# Patient Record
Sex: FEMALE | Race: BLACK OR AFRICAN AMERICAN | URBAN - METROPOLITAN AREA
[De-identification: names, ages, dates, MRNs, and addresses within clinical notes are randomized per-mention and may not be internally consistent; named-entity substitution may affect disease eponyms.]

---

## 2021-02-01 ENCOUNTER — WEB ENCOUNTER (OUTPATIENT)
Dept: URBAN - METROPOLITAN AREA CLINIC 40 | Facility: CLINIC | Age: 31
End: 2021-02-01

## 2021-02-01 ENCOUNTER — LAB OUTSIDE AN ENCOUNTER (OUTPATIENT)
Dept: URBAN - METROPOLITAN AREA CLINIC 40 | Facility: CLINIC | Age: 31
End: 2021-02-01

## 2021-02-01 ENCOUNTER — OFFICE VISIT (OUTPATIENT)
Dept: URBAN - METROPOLITAN AREA CLINIC 40 | Facility: CLINIC | Age: 31
End: 2021-02-01
Payer: COMMERCIAL

## 2021-02-01 DIAGNOSIS — K76.9 LIVER DISEASE: ICD-10-CM

## 2021-02-01 DIAGNOSIS — R94.5 ABNORMAL LIVER FUNCTION: ICD-10-CM

## 2021-02-01 DIAGNOSIS — K76.0 FATTY LIVER: ICD-10-CM

## 2021-02-01 DIAGNOSIS — R74.8 ELEVATED LIVER ENZYMES: ICD-10-CM

## 2021-02-01 PROCEDURE — 99242 OFF/OP CONSLTJ NEW/EST SF 20: CPT | Performed by: INTERNAL MEDICINE

## 2021-02-01 PROCEDURE — 99202 OFFICE O/P NEW SF 15 MIN: CPT | Performed by: INTERNAL MEDICINE

## 2021-02-01 RX ORDER — CLOBETASOL PROPIONATE 0.5 MG/ML
(PRIOR AUTH#:000000919915) SOLUTION TOPICAL
Qty: 50 UNSPECIFIED | Status: DISCONTINUED | COMMUNITY

## 2021-02-01 NOTE — HPI-TODAY'S VISIT:
Pt referred by Dr. Francisco J Bobo. A copy of this note will be provided to review. Early December    repeat labs 2 weeks later showed  .  No hepatitis testing done.  Patient was told she may have gallbladder disease or liver disease and referred to GI.  I do not know what her bilirubin was.  Patient rarely drinks alcohol, maybe 3 times a month and socially only.  Daily marijuana use prior to these labs but maybe once weekly since then.  No additional drug use or other medication use that would be associated with elevated liver tests.  History of HSV, she takes acyclovir occasionally with outbreaks but otherwise no daily use.  No history of significant jaundice or abnormal illness previously.  She really had not seen healthcare providers recently, she presented to OB/GYN for AN annual evaluation and these labs were noticed.

## 2021-02-08 LAB
A/G RATIO: 0.6
AAT, DNA ANALYSIS: (no result)
ACTIN (SMOOTH MUSCLE) ANTIBODY: 162
ADDITIONAL INFORMATION:: (no result)
ALBUMIN: 3.5
ALKALINE PHOSPHATASE: 354
ALT (SGPT): 259
ANA DIRECT: POSITIVE
AST (SGOT): 318
BASO (ABSOLUTE): 0.1
BASOS: 1
BILIRUBIN, TOTAL: 0.6
BUN/CREATININE RATIO: 13
BUN: 9
CALCIUM: 8.8
CARBON DIOXIDE, TOTAL: 23
CERULOPLASMIN: 32.5
CHLORIDE: 105
CREATININE: 0.71
EGFR IF AFRICN AM: 130
EGFR IF NONAFRICN AM: 113
EOS (ABSOLUTE): 0.3
EOS: 6
GGT: 407
GLOBULIN, TOTAL: 6.2
GLUCOSE: 93
HBSAG SCREEN: NEGATIVE
HEMATOCRIT: 32.5
HEMATOLOGY COMMENTS:: (no result)
HEMOGLOBIN: 10.4
HEP A AB, IGM: NEGATIVE
HEP B CORE AB, IGM: NEGATIVE
HEP C VIRUS AB: 0.1
HEREDITARY  HEMOCHROMATOSIS: (no result)
IMMATURE CELLS: (no result)
IMMATURE GRANS (ABS): 0
IMMATURE GRANULOCYTES: 0
LYMPHS (ABSOLUTE): 2.1
LYMPHS: 38
Lab: (no result)
MCH: 28.7
MCHC: 32
MCV: 90
MITOCHONDRIAL (M2) ANTIBODY: <20
MONOCYTES(ABSOLUTE): 0.6
MONOCYTES: 11
NEUTROPHILS (ABSOLUTE): 2.5
NEUTROPHILS: 44
NRBC: (no result)
PLATELETS: 199
POTASSIUM: 4.1
PROTEIN, TOTAL: 9.7
RBC: 3.63
RDW: 13.1
SODIUM: 136
WBC: 5.5

## 2021-02-09 ENCOUNTER — TELEPHONE ENCOUNTER (OUTPATIENT)
Dept: URBAN - METROPOLITAN AREA CLINIC 92 | Facility: CLINIC | Age: 31
End: 2021-02-09

## 2021-02-09 ENCOUNTER — LAB OUTSIDE AN ENCOUNTER (OUTPATIENT)
Dept: URBAN - METROPOLITAN AREA CLINIC 92 | Facility: CLINIC | Age: 31
End: 2021-02-09

## 2021-02-12 ENCOUNTER — TELEPHONE ENCOUNTER (OUTPATIENT)
Dept: URBAN - METROPOLITAN AREA SURGERY CENTER 30 | Facility: SURGERY CENTER | Age: 31
End: 2021-02-12

## 2021-02-26 ENCOUNTER — OFFICE VISIT (OUTPATIENT)
Dept: URBAN - METROPOLITAN AREA CLINIC 40 | Facility: CLINIC | Age: 31
End: 2021-02-26

## 2021-03-10 ENCOUNTER — TELEPHONE ENCOUNTER (OUTPATIENT)
Dept: URBAN - METROPOLITAN AREA CLINIC 92 | Facility: CLINIC | Age: 31
End: 2021-03-10

## 2021-03-17 ENCOUNTER — LAB OUTSIDE AN ENCOUNTER (OUTPATIENT)
Dept: URBAN - METROPOLITAN AREA TELEHEALTH 2 | Facility: TELEHEALTH | Age: 31
End: 2021-03-17

## 2021-03-17 ENCOUNTER — OFFICE VISIT (OUTPATIENT)
Dept: URBAN - METROPOLITAN AREA TELEHEALTH 2 | Facility: TELEHEALTH | Age: 31
End: 2021-03-17
Payer: COMMERCIAL

## 2021-03-17 DIAGNOSIS — Z79.899 HIGH RISK MEDICATION USE: ICD-10-CM

## 2021-03-17 DIAGNOSIS — K75.4 AUTOIMMUNE HEPATITIS: ICD-10-CM

## 2021-03-17 DIAGNOSIS — R74.8 ABNORMAL LIVER ENZYMES: ICD-10-CM

## 2021-03-17 DIAGNOSIS — R76.9 ABNORMAL IMMUNOLOGICAL FINDING IN SERUM: ICD-10-CM

## 2021-03-17 PROBLEM — 271737000 ANEMIA: Status: ACTIVE | Noted: 2021-03-17

## 2021-03-17 PROCEDURE — 99244 OFF/OP CNSLTJ NEW/EST MOD 40: CPT

## 2021-03-17 PROCEDURE — 99204 OFFICE O/P NEW MOD 45 MIN: CPT

## 2021-03-17 RX ORDER — URSODIOL 300 MG/1
1 CAPSULE CAPSULE ORAL
Qty: 60 | Refills: 1 | OUTPATIENT

## 2021-03-17 RX ORDER — VALACYCLOVIR 500 MG/1
1 TABLET TABLET, FILM COATED ORAL BID PRN
Status: ACTIVE | COMMUNITY

## 2021-03-17 RX ORDER — PREDNISONE 5 MG/1
4 TABLET TABLET ORAL
Qty: 120 | Refills: 1 | OUTPATIENT
End: 2021-05-16

## 2021-03-17 NOTE — HPI-TODAY'S VISIT:
Patient is a 31-year-old female seen by Dr. Ryan at the [place] office and referred for [facility] recently diagnosed.  Patient is noted to have abnormal liver labs there were noted late    She was referred by Dr. Bobo to see him and in late  she had an AST of 319  and repeat labs then showed an  and .  She says he was a new doctor for her and she had just started to see the primary md.  She said had gone in for pap smear and labs abn.  She does not recall liver lab issue prior.  Initially she had been told she may have had gallbladder disease  but no symptoms and not prior liver disease personally.  She says in family had a second cousin female and 49 when  (she was ill and no insurance and so not tested) from cirrhosis and 2 uncles with cirrhosis possibly from alcohol and passed.  Dr Ryan: She mentioned rare alcohol usage maybe 3 times a month and very social only. She has stopped that.  She has stage 3 fibrosis and should not drink.  She reviewed bx with [facility].  She did states she was using marijuana daily prior but then had switched to once a week since then and she is using.  Some data that marijuana may advance fibrosis in pts with hcv is from 1980s and so generally recommended to be avoided due to risk of fibrosis there. Some data with cbd oil for liver labs and and fibrosis to be off.  Occasional issues with herpes for which she would take acyclovir for outbreaks.  BMI was actually low at 19.02. She says is about the same. No weight loss.  She was suspected to have autoimmune hepatitis and was set up for biopsy.  Most recent laboratories we saw are from 2021 that showed a ceruloplasmin of 32.5 which was normal gamma GTP of 407 which is elevated smooth muscle antibody of 162 which is elevated AMA that is negative at less than 20 glucose of 93 BUN of 9 creatinine 0.71 sodium 136 potassium 4.1 chloride 105 CO2 of 23 albumin slightly low at 3.5 bilirubin 0.6 alk phos 354   hepatitis A IgM negative B surface antigen negative B core IgM negative hep C antibody negative HUE was positive.  Alpha-1 was negative for any mutation.  White blood cell count was 5.5 hemoglobin 10.4 (she says is little anemic) platelet count was 199 MCV was 90 neutrophils were 2.5 hemochromatosis assay was done and was no mutation noted.  Pathology report back from [facility] with a biopsy done on 2021 showing interface hepatitis with numerous plasma cells grade 3 (one part said grade 4)  and septal fibrosis stage III.  Findings were consistent with autoimmune hepatitis per the pathologist.  No diastase resistant globular inclusions were noted.  They did feel that inflammation was grade 4 inflammation on the body of the report and that the fibrosis with stage III steatosis was absent iron deposition was 0.  [facility] ultrasound done on 2021 showed the liver to be normal, pancreas normal were seen gallbladder showed no evidence inflammation or gallstone common bile duct showed no duct dilation right kidney showed no hydronephrosis overall no impression of abnormality was seen.  She with regards to kids is not planning any more kids. She is sexually active.  Need to talk to gyn re prophylaxis with the aih.  She has one 6 yr old boy.  Takes some green tea and she has hina green tea has one a day. Turmeric not using. No elderberry.  She says green tea had been using it occ and she has been on it more so after the biopsy. It can trigger aih and immune issues.  ISP sparing agent to limit. AZA is the least dangerous one but cellcept stronger but has more birth defects risk.  Plan 1.  Need to start steroids to cool off the hepatitis asap.  Also will add ursodiol to help cool this off faster. Try to get away with the lowest doses possible for this. 2.  Plan when labs are better than to add the azathioprine but tiarra if need pregnancy plan. If labs get better may be easier to get a plan for pregnancy prevention.. 3.  Needs labs done again now for baseline as well as then again in 2 weeks and 4 weeks. 4.  Plan visit face-to-face with Camille in 4 weeks. 5.  Hope is to get her weaned down of the steroids once the labs are normalized and can add the steroid sparing isp. Probably Azathioprine Long-Term. 6.  Need to Check Hepatitis a and B Immunity check. 7, Avoid the green tea.  Cautioned pt re the pandemic to use strict hand washing, wear mask even if the covid 19 vaccine has been obtained, socially distance, and stay on the cdc web page to look for updates as this a moving target and lost of updates coming at us.  Duration of the visit was  53 min  total with 20 min of prep with chart review and 33 min from 1030 to 1103 am by clock as healow clock off for the video part of the visit due to internet issues with the pt with greater than 50% of time spent reviewing chart and events with the pt.

## 2021-03-24 ENCOUNTER — LAB OUTSIDE AN ENCOUNTER (OUTPATIENT)
Dept: URBAN - METROPOLITAN AREA TELEHEALTH 2 | Facility: TELEHEALTH | Age: 31
End: 2021-03-24

## 2021-03-26 ENCOUNTER — TELEPHONE ENCOUNTER (OUTPATIENT)
Dept: URBAN - METROPOLITAN AREA CLINIC 92 | Facility: CLINIC | Age: 31
End: 2021-03-26

## 2021-03-26 LAB
A/G RATIO: 0.7
ALBUMIN: 4
ALKALINE PHOSPHATASE: 206
ALT (SGPT): 50
AST (SGOT): 36
BASO (ABSOLUTE): 0.1
BASOS: 1
BILIRUBIN, TOTAL: 0.7
BUN/CREATININE RATIO: 13
BUN: 10
CALCIUM: 9.3
CARBON DIOXIDE, TOTAL: 23
CHLORIDE: 100
CREATININE: 0.78
EGFR IF AFRICN AM: 116
EGFR IF NONAFRICN AM: 101
EOS (ABSOLUTE): 0
EOS: 0
GLOBULIN, TOTAL: 5.6
GLUCOSE: 94
HEMATOCRIT: 33.7
HEMATOLOGY COMMENTS:: (no result)
HEMOGLOBIN: 10.8
IMMATURE CELLS: (no result)
IMMATURE GRANS (ABS): 0
IMMATURE GRANULOCYTES: 1
IMMUNOGLOBULIN A, QN, SERUM: 243
IMMUNOGLOBULIN E, TOTAL: 66
IMMUNOGLOBULIN G, QN, SERUM: 4261
IMMUNOGLOBULIN M, QN, SERUM: 164
LIPASE: 29
LYMPHS (ABSOLUTE): 1.2
LYMPHS: 18
MCH: 28.8
MCHC: 32
MCV: 90
MONOCYTES(ABSOLUTE): 0.2
MONOCYTES: 3
NEUTROPHILS (ABSOLUTE): 5.1
NEUTROPHILS: 77
NRBC: (no result)
PLATELETS: 223
POTASSIUM: 4.4
PROTEIN, TOTAL: 9.6
RBC: 3.75
RDW: 13.1
SODIUM: 133
WBC: 6.6

## 2021-03-26 NOTE — HPI-TODAY'S VISIT:
March 16: Igg 4261 elevated but others like iga 243 and igm 164 and ige 66 are normal. Lipase 29.  Glu 94 and cr 0.78 and na 133 and k 4.4 and cl 100 and co2 23 and ca 9.3 and alb 4.0 and tb 0.7 and alk 206 and prior ast 354 so lower. Ast 36 and alt 50 and prior ast 318 and alt 259 so much lower. Wbc 6.6 and hg 10.8 and plat 223. Prior hg 10.4. total proteim high 9.6 also and show local md. Spoke with pt and dropped a lot and she has not seen gyn re pregnancy plan for the pt for steroid sparing options.

## 2021-03-29 ENCOUNTER — OFFICE VISIT (OUTPATIENT)
Dept: URBAN - METROPOLITAN AREA CLINIC 40 | Facility: CLINIC | Age: 31
End: 2021-03-29
Payer: COMMERCIAL

## 2021-03-29 ENCOUNTER — OFFICE VISIT (OUTPATIENT)
Dept: URBAN - METROPOLITAN AREA CLINIC 40 | Facility: CLINIC | Age: 31
End: 2021-03-29

## 2021-03-29 DIAGNOSIS — R76.9 ABNORMAL IMMUNOLOGICAL FINDING IN SERUM: ICD-10-CM

## 2021-03-29 DIAGNOSIS — Z98.890 HISTORY OF LIVER BIOPSY: ICD-10-CM

## 2021-03-29 DIAGNOSIS — R74.8 ABNORMAL LIVER ENZYMES: ICD-10-CM

## 2021-03-29 DIAGNOSIS — K75.4 AUTOIMMUNE HEPATITIS: ICD-10-CM

## 2021-03-29 PROCEDURE — 99213 OFFICE O/P EST LOW 20 MIN: CPT | Performed by: INTERNAL MEDICINE

## 2021-03-29 RX ORDER — VALACYCLOVIR 500 MG/1
1 TABLET TABLET, FILM COATED ORAL BID PRN
Status: ACTIVE | COMMUNITY

## 2021-03-29 RX ORDER — PREDNISONE 5 MG/1
4 TABLET TABLET ORAL
Qty: 120 | Refills: 1 | Status: ACTIVE | COMMUNITY
End: 2021-05-16

## 2021-03-29 RX ORDER — PREDNISONE 5 MG/1
4 TABLET TABLET ORAL
Qty: 120 | Refills: 1 | Status: ACTIVE | COMMUNITY

## 2021-03-29 RX ORDER — URSODIOL 300 MG/1
1 CAPSULE CAPSULE ORAL
Qty: 60 | Refills: 1 | Status: ACTIVE | COMMUNITY

## 2021-03-29 NOTE — HPI-TODAY'S VISIT:
Pt following with Dr. Isbell at the liver clinic. Autoimmune hepatitis confirmed with marked active inflammation on Bx. Now on steroids and JULIA. Follow up labs looked very good and liver enzymes nearly normalized. Energy level is "great" in her own words. She is avoiding ETOH and marijuana.

## 2021-03-31 ENCOUNTER — LAB OUTSIDE AN ENCOUNTER (OUTPATIENT)
Dept: URBAN - METROPOLITAN AREA TELEHEALTH 2 | Facility: TELEHEALTH | Age: 31
End: 2021-03-31

## 2021-04-07 ENCOUNTER — LAB OUTSIDE AN ENCOUNTER (OUTPATIENT)
Dept: URBAN - METROPOLITAN AREA TELEHEALTH 2 | Facility: TELEHEALTH | Age: 31
End: 2021-04-07

## 2021-04-13 ENCOUNTER — TELEPHONE ENCOUNTER (OUTPATIENT)
Dept: URBAN - METROPOLITAN AREA CLINIC 92 | Facility: CLINIC | Age: 31
End: 2021-04-13

## 2021-04-13 LAB
A/G RATIO: 0.9
ALBUMIN: 4
ALKALINE PHOSPHATASE: 124
ALT (SGPT): 24
AST (SGOT): 26
BASO (ABSOLUTE): 0
BASOS: 0
BILIRUBIN, TOTAL: 0.3
BUN/CREATININE RATIO: 26
BUN: 18
CALCIUM: 9.4
CARBON DIOXIDE, TOTAL: 25
CHLORIDE: 102
CREATININE: 0.68
EGFR IF AFRICN AM: 134
EGFR IF NONAFRICN AM: 116
EOS (ABSOLUTE): 0
EOS: 0
GLOBULIN, TOTAL: 4.4
GLUCOSE: 101
HEMATOCRIT: 36.4
HEMATOLOGY COMMENTS:: (no result)
HEMOGLOBIN: 11.6
IMMATURE CELLS: (no result)
IMMATURE GRANS (ABS): 0
IMMATURE GRANULOCYTES: 0
LIPASE: 43
LYMPHS (ABSOLUTE): 4.6
LYMPHS: 47
MCH: 28.5
MCHC: 31.9
MCV: 89
MONOCYTES(ABSOLUTE): 0.8
MONOCYTES: 8
NEUTROPHILS (ABSOLUTE): 4.5
NEUTROPHILS: 45
NRBC: (no result)
PLATELETS: 209
POTASSIUM: 3.5
PROTEIN, TOTAL: 8.4
RBC: 4.07
RDW: 12.7
SODIUM: 139
WBC: 10

## 2021-04-13 NOTE — HPI-TODAY'S VISIT:
April 12 labs show white blood cell count 10 hemoglobin 11.6 platelet count 209.  Good to see the hemoglobin up and it used to be 10.8 and prior was 10.4 so that is doing better.  MCV is 89.  Neutrophils are 4.5.  Sodium was 139 potassium 3.5 chloride 102 CO2 25 albumin 4.0 bilirubin 0.3 alkaline phosphatase down to 124 from 206 and prior 354.  AST down to 26 ALT 24 and previously AST 36 and ALT 50 and prior to that  and  so labs were much lower.  Ideal ALT is less than 25.  Glucose 101 BUN of 18 creatinine 0.68.  Lipase was 43. We will discuss taper options at visit with you in two days.

## 2021-04-14 ENCOUNTER — LAB OUTSIDE AN ENCOUNTER (OUTPATIENT)
Dept: URBAN - METROPOLITAN AREA TELEHEALTH 2 | Facility: TELEHEALTH | Age: 31
End: 2021-04-14

## 2021-04-15 ENCOUNTER — LAB OUTSIDE AN ENCOUNTER (OUTPATIENT)
Dept: URBAN - METROPOLITAN AREA TELEHEALTH 2 | Facility: TELEHEALTH | Age: 31
End: 2021-04-15

## 2021-04-15 ENCOUNTER — OFFICE VISIT (OUTPATIENT)
Dept: URBAN - METROPOLITAN AREA TELEHEALTH 2 | Facility: TELEHEALTH | Age: 31
End: 2021-04-15
Payer: COMMERCIAL

## 2021-04-15 DIAGNOSIS — R74.8 ABNORMAL LIVER ENZYMES: ICD-10-CM

## 2021-04-15 DIAGNOSIS — K75.4 AUTOIMMUNE HEPATITIS: ICD-10-CM

## 2021-04-15 DIAGNOSIS — R76.9 ABNORMAL IMMUNOLOGICAL FINDING IN SERUM: ICD-10-CM

## 2021-04-15 DIAGNOSIS — Z79.899 HIGH RISK MEDICATION USE: ICD-10-CM

## 2021-04-15 PROCEDURE — 99214 OFFICE O/P EST MOD 30 MIN: CPT

## 2021-04-15 RX ORDER — AZATHIOPRINE 50 1/1
TAKE 1 TABLET(50 MG) BY MOUTH EVERY DAY TABLET ORAL ONCE A DAY
Qty: 30 | Refills: 1 | OUTPATIENT
End: 2021-06-14

## 2021-04-15 RX ORDER — URSODIOL 300 MG/1
1 CAPSULE CAPSULE ORAL
Qty: 60 | Refills: 1 | Status: ACTIVE | COMMUNITY

## 2021-04-15 RX ORDER — PREDNISONE 5 MG/1
4 TABLET TABLET ORAL
Qty: 120 | Refills: 1 | Status: ACTIVE | COMMUNITY

## 2021-04-15 RX ORDER — URSODIOL 300 MG/1
1 CAPSULE CAPSULE ORAL
Qty: 60 | Refills: 1 | OUTPATIENT

## 2021-04-15 RX ORDER — VALACYCLOVIR 500 MG/1
1 TABLET TABLET, FILM COATED ORAL BID PRN
Status: ACTIVE | COMMUNITY

## 2021-04-15 RX ORDER — PREDNISONE 5 MG/1
3 TABLET (DOSE REDUCTION) TABLET ORAL
Qty: 90 | Refills: 1 | OUTPATIENT
End: 2021-06-14

## 2021-04-15 NOTE — HPI-TODAY'S VISIT:
Patient is a 31-year-old female seen by Dr. Ryan at the [place] office  after 2021 visit and referred for aih recently diagnosed.   Interval notes: 2021 labs show white blood cell count 10 hemoglobin 11.6 platelet count 209.  Good to see the hemoglobin up and it used to be 10.8 and prior was 10.4 so that is doing better.  MCV is 89.  Neutrophils are 4.5.  Sodium was 139 potassium 3.5 chloride 102 CO2 25 albumin 4.0 bilirubin 0.3 alkaline phosphatase down to 124 from 206 and prior 354.  AST down to 26 ALT 24 and previously AST 36 and ALT 50 and prior to that  and  so labs were much lower.  Ideal ALT is less than 25.  Glucose 101 BUN of 18 creatinine 0.68.  Lipase was 43.   : Igg 4261 elevated but others like iga 243 and igm 164 and ige 66 are normal. Lipase 29.  Glu 94 and cr 0.78 and na 133 and k 4.4 and cl 100 and co2 23 and ca 9.3 and alb 4.0 and tb 0.7 and alk 206 and prior ast 354 so lower. Ast 36 and alt 50 and prior ast 318 and alt 259 so much lower. Wbc 6.6 and hg 10.8 and plat 223. Prior hg 10.4. total protein high 9.6 also and show local md.   Spoke with pt and dropped a lot and she has not seen gyn re pregnancy plan for the pt for steroid sparing options and she says she wants to use condoms.  105 pounds 47.7 kg would give 50mg azathioprine as least preg risk for her. Can cause pancreatitis and we would watch and little lower risk if get pregant.  Prednisone 20mg po qd and ursodiol 300mg po bid.    Recap of initial visit info:  Patient is noted to have abnormal liver labs there were noted late   She was referred by Dr. Bobo to see him and in late  she had an AST of 319  and repeat labs then showed an  and .  She says he was a new doctor for her and she had just started to see the primary md.  She said had gone in for pap test and found labs abn.  She does not recall liver lab issue prior.  Initially she had been told she may have had gallbladder disease  but no symptoms and not prior liver disease personally.  She says in family had a second cousin female and 49 when  (she was ill and no insurance and so not tested) from cirrhosis and 2 uncles with cirrhosis possibly from alcohol and passed.  Dr Ryan when he saw: She mentioned rare alcohol usage maybe 3 times a month and very social only. She had stopped that.  She has stage 3 fibrosis and should not drink with hx.  She reviewed bx with [facility].  She did state prirot that she was on some marijuana daily prior but then had switched to once a week since then and since almost stopped and stressed can inc lfts and fibrosis.  Talked about older data that marijuana may advance fibrosis in pts who had hcv is from 1980s and so generally recommended to be avoided due to risk of fibrosis there. Some data since then with cbd oil for liver labs and and fibrosis to be off.  Occasional issues with herpes for which she would take acyclovir for outbreaks.  BMI was actually low at 19.02.  weight up 10 pounds.  She was suspected to have autoimmune hepatitis and was set up for biopsy by Dr Ryan.  Most recent laboratories we saw are from 2021 that showed a ceruloplasmin of 32.5 which was normal gamma GTP of 407 which is elevated smooth muscle antibody of 162 which is elevated AMA that is negative at less than 20 glucose of 93 BUN of 9 creatinine 0.71 sodium 136 potassium 4.1 chloride 105 CO2 of 23 albumin slightly low at 3.5 bilirubin 0.6 alk phos 354   hepatitis A IgM negative B surface antigen negative B core IgM negative hep C antibody negative HUE was positive.  Alpha-1 was negative for any mutation.  White blood cell count was 5.5 hemoglobin 10.4 (she says is little anemic) platelet count was 199 MCV was 90 neutrophils were 2.5 hemochromatosis assay was done and was no mutation noted.  Pathology report back from [facility] with a biopsy done on 2021 showing interface hepatitis with numerous plasma cells grade 3 (one part said grade 4)  and septal fibrosis stage III.  Findings were consistent with autoimmune hepatitis per the pathologist.  No diastase resistant globular inclusions were noted.  They did feel that inflammation was grade 4 inflammation on the body of the report and that the fibrosis with stage III steatosis was absent iron deposition was 0.  [facility] ultrasound done on 2021 showed the liver to be normal, pancreas normal were seen gallbladder showed no evidence inflammation or gallstone common bile duct showed no duct dilation right kidney showed no hydronephrosis overall no impression of abnormality was seen.  She with regards to kids is not planning any more kids. She is sexually active and aware that steroid sparing agents have some risk for having children. Less with aza than with cellcept.  Need to talk to gyn re long term re  prophylaxis with the aih.  She has one 6 yr old boy.  Yomi was taking some green tea and she has hina green tea has one a day. Turmeric not using. No elderberry.  She said green tea had been using it occ and she has been on it more so after the biopsy. It can trigger aih and immune issues.  ISP sparing agent to limit. AZA is the least dangerous one but cellcept stronger but has more birth defects risk.  Plan 1.  Add the azathioprine 50mg po qd and watch for toxicity. Use condoms for now. 2. Staying on the ursodiol and keeping low dose due to weight. 3. Drop prednisone to 15mg po qd and do labs in 1 and 2 and 3 weeks. 4. RTC in 3 weeks to then talk more drop in the steroids. 5.  She had done first covid 19 pfizer and once fully vaccinated do in person. 6.  Need to Check Hepatitis a and B Immunity check. 7. Avoid the green tea.  Cautioned pt re the pandemic to use strict hand washing, wear mask even if the covid 19 vaccine has been obtained, socially distance, and stay on the cdc web page to look for updates as this a moving target and lost of updates coming at us.  Duration of the visit was  33 min  via [place] with the pt with greater than 50% of time spent reviewing chart and events with the pt.

## 2021-04-22 ENCOUNTER — LAB OUTSIDE AN ENCOUNTER (OUTPATIENT)
Dept: URBAN - METROPOLITAN AREA TELEHEALTH 2 | Facility: TELEHEALTH | Age: 31
End: 2021-04-22

## 2021-04-24 ENCOUNTER — TELEPHONE ENCOUNTER (OUTPATIENT)
Dept: URBAN - METROPOLITAN AREA CLINIC 92 | Facility: CLINIC | Age: 31
End: 2021-04-24

## 2021-04-24 LAB
A/G RATIO: 0.9
ALBUMIN: 3.7
ALKALINE PHOSPHATASE: 115
ALT (SGPT): 29
AST (SGOT): 37
BASO (ABSOLUTE): 0
BASOS: 0
BILIRUBIN, TOTAL: 0.6
BUN/CREATININE RATIO: 15
BUN: 11
CALCIUM: 9
CARBON DIOXIDE, TOTAL: 24
CHLORIDE: 102
CREATININE: 0.74
EGFR IF AFRICN AM: 124
EGFR IF NONAFRICN AM: 108
EOS (ABSOLUTE): 0
EOS: 0
GLOBULIN, TOTAL: 4.2
GLUCOSE: 98
HEMATOCRIT: 34.6
HEMATOLOGY COMMENTS:: (no result)
HEMOGLOBIN: 11
IMMATURE CELLS: (no result)
IMMATURE GRANS (ABS): 0.1
IMMATURE GRANULOCYTES: 1
LIPASE: 35
LYMPHS (ABSOLUTE): 1.2
LYMPHS: 14
MCH: 28.9
MCHC: 31.8
MCV: 91
MONOCYTES(ABSOLUTE): 0.6
MONOCYTES: 7
NEUTROPHILS (ABSOLUTE): 6.7
NEUTROPHILS: 78
NRBC: (no result)
PLATELETS: 217
POTASSIUM: 3.7
PROTEIN, TOTAL: 7.9
RBC: 3.8
RDW: 12.1
SODIUM: 136
WBC: 8.6

## 2021-04-24 NOTE — HPI-TODAY'S VISIT:
April 23, 2021 labs show white blood cell count 8.6 hemoglobin 11 platelets 272.  Previously hemoglobin was 11.6 and prior to that was 10.8.  At this time is still slightly low on hg.  Neutrophils are normal at 6.7.  AST noted to be 37 and ALT 29 previously 26 and 24 respectively.  Alkaline phosphatase down to 115 from 124 and previously 206.  Bilirubin 0.6 which is normal.  Albumin slightly low at 3.7.  Sodium 136 potassium 3.7 glucose 98 BUN of 11 creatinine 0.74.  Lipase 35.  Per notes these are the 1 week labs on the 15 mg dose.  Please do labs as we mention again next week and then 2 weeks thereafter.  That allows us to settle down.

## 2021-04-28 ENCOUNTER — TELEPHONE ENCOUNTER (OUTPATIENT)
Dept: URBAN - METROPOLITAN AREA CLINIC 92 | Facility: CLINIC | Age: 31
End: 2021-04-28

## 2021-04-29 ENCOUNTER — OFFICE VISIT (OUTPATIENT)
Dept: URBAN - METROPOLITAN AREA CLINIC 40 | Facility: CLINIC | Age: 31
End: 2021-04-29

## 2021-04-29 ENCOUNTER — LAB OUTSIDE AN ENCOUNTER (OUTPATIENT)
Dept: URBAN - METROPOLITAN AREA TELEHEALTH 2 | Facility: TELEHEALTH | Age: 31
End: 2021-04-29

## 2021-05-06 ENCOUNTER — LAB OUTSIDE AN ENCOUNTER (OUTPATIENT)
Dept: URBAN - METROPOLITAN AREA TELEHEALTH 2 | Facility: TELEHEALTH | Age: 31
End: 2021-05-06

## 2021-05-11 ENCOUNTER — TELEPHONE ENCOUNTER (OUTPATIENT)
Dept: URBAN - METROPOLITAN AREA CLINIC 92 | Facility: CLINIC | Age: 31
End: 2021-05-11

## 2021-05-11 RX ORDER — PREDNISONE 5 MG/1
3 TABLET (DOSE REDUCTION) TABLET ORAL
Qty: 90 | Refills: 0 | OUTPATIENT

## 2021-05-15 ENCOUNTER — TELEPHONE ENCOUNTER (OUTPATIENT)
Dept: URBAN - METROPOLITAN AREA CLINIC 92 | Facility: CLINIC | Age: 31
End: 2021-05-15

## 2021-05-15 LAB
A/G RATIO: 1.1
ALBUMIN: 4
ALKALINE PHOSPHATASE: 140
ALT (SGPT): 35
AST (SGOT): 45
BASO (ABSOLUTE): 0
BASOS: 1
BILIRUBIN, TOTAL: 0.4
BUN/CREATININE RATIO: 15
BUN: 11
CALCIUM: 9.3
CARBON DIOXIDE, TOTAL: 25
CHLORIDE: 102
CREATININE: 0.75
EGFR IF AFRICN AM: 122
EGFR IF NONAFRICN AM: 106
EOS (ABSOLUTE): 0.1
EOS: 2
GLOBULIN, TOTAL: 3.8
GLUCOSE: 86
HEMATOCRIT: 34
HEMATOLOGY COMMENTS:: (no result)
HEMOGLOBIN: 10.9
IMMATURE CELLS: (no result)
IMMATURE GRANS (ABS): 0
IMMATURE GRANULOCYTES: 0
LIPASE: 28
LYMPHS (ABSOLUTE): 2.6
LYMPHS: 39
MCH: 28.4
MCHC: 32.1
MCV: 89
MONOCYTES(ABSOLUTE): 0.5
MONOCYTES: 8
NEUTROPHILS (ABSOLUTE): 3.4
NEUTROPHILS: 50
NRBC: (no result)
PLATELETS: 262
POTASSIUM: 3.8
PROTEIN, TOTAL: 7.8
RBC: 3.84
RDW: 13.5
SODIUM: 140
WBC: 6.6

## 2021-05-15 NOTE — HPI-TODAY'S VISIT:
May 13 labs: lipase 28 down from 35. Tb 0.4 and alk 140 up from 115 and ast 45 and alt 35 (ast 37 and alt 29) so little up. Na 140 and k 3.8 and glu 86 and bun 11 and cr 0.75. wbfc 6.6 hg 10.9 from 11 and plat 262.  Mcv 89.neutrophils 3.4. Spoke with pt and so given weight cannot change the doses now and we will follow the labs this week and if not better then consider the mmf switch option.

## 2021-05-27 ENCOUNTER — LAB OUTSIDE AN ENCOUNTER (OUTPATIENT)
Dept: URBAN - METROPOLITAN AREA CLINIC 86 | Facility: CLINIC | Age: 31
End: 2021-05-27

## 2021-05-28 ENCOUNTER — OFFICE VISIT (OUTPATIENT)
Dept: URBAN - METROPOLITAN AREA TELEHEALTH 2 | Facility: TELEHEALTH | Age: 31
End: 2021-05-28
Payer: COMMERCIAL

## 2021-05-28 ENCOUNTER — TELEPHONE ENCOUNTER (OUTPATIENT)
Dept: URBAN - METROPOLITAN AREA CLINIC 92 | Facility: CLINIC | Age: 31
End: 2021-05-28

## 2021-05-28 DIAGNOSIS — R76.9 ABNORMAL IMMUNOLOGICAL FINDING IN SERUM: ICD-10-CM

## 2021-05-28 DIAGNOSIS — K75.4 AUTOIMMUNE HEPATITIS: ICD-10-CM

## 2021-05-28 DIAGNOSIS — Z79.899 HIGH RISK MEDICATION USE: ICD-10-CM

## 2021-05-28 DIAGNOSIS — R74.8 ABNORMAL LIVER ENZYMES: ICD-10-CM

## 2021-05-28 LAB
A/G RATIO: 1
ALBUMIN: 4
ALKALINE PHOSPHATASE: 110
ALT (SGPT): 23
AST (SGOT): 34
BASO (ABSOLUTE): 0
BASOS: 0
BILIRUBIN, TOTAL: 0.3
BUN/CREATININE RATIO: 14
BUN: 10
CALCIUM: 9.4
CARBON DIOXIDE, TOTAL: 24
CHLORIDE: 103
CREATININE: 0.69
EGFR IF AFRICN AM: 133
EGFR IF NONAFRICN AM: 116
EOS (ABSOLUTE): 0
EOS: 0
GLOBULIN, TOTAL: 3.9
GLUCOSE: 83
HEMATOCRIT: 32.8
HEMATOLOGY COMMENTS:: (no result)
HEMOGLOBIN: 10.3
IMMATURE CELLS: (no result)
IMMATURE GRANS (ABS): 0.1
IMMATURE GRANULOCYTES: 1
LIPASE: 26
LYMPHS (ABSOLUTE): 1.2
LYMPHS: 16
MCH: 28.5
MCHC: 31.4
MCV: 91
MONOCYTES(ABSOLUTE): 0.5
MONOCYTES: 7
NEUTROPHILS (ABSOLUTE): 5.6
NEUTROPHILS: 76
NRBC: (no result)
PLATELETS: 265
POTASSIUM: 3.6
PROTEIN, TOTAL: 7.9
RBC: 3.62
RDW: 13.7
SODIUM: 140
WBC: 7.3

## 2021-05-28 PROCEDURE — 99214 OFFICE O/P EST MOD 30 MIN: CPT

## 2021-05-28 RX ORDER — AZATHIOPRINE 50 1/1
TAKE 1 TABLET(50 MG) BY MOUTH EVERY DAY TABLET ORAL ONCE A DAY
Qty: 30 | Refills: 1 | OUTPATIENT

## 2021-05-28 RX ORDER — AZATHIOPRINE 50 1/1
TAKE 1 TABLET(50 MG) BY MOUTH EVERY DAY TABLET ORAL ONCE A DAY
Qty: 30 | Refills: 1 | Status: ACTIVE | COMMUNITY
End: 2021-06-14

## 2021-05-28 RX ORDER — PREDNISONE 5 MG/1
3 TABLET (DOSE REDUCTION) TABLET ORAL
Qty: 90 | Refills: 0 | Status: ACTIVE | COMMUNITY

## 2021-05-28 RX ORDER — URSODIOL 300 MG/1
1 CAPSULE CAPSULE ORAL
Qty: 60 | Refills: 1 | OUTPATIENT

## 2021-05-28 RX ORDER — URSODIOL 300 MG/1
1 CAPSULE CAPSULE ORAL
Qty: 60 | Refills: 1 | Status: ACTIVE | COMMUNITY

## 2021-05-28 RX ORDER — PREDNISONE 5 MG/1
3 TABLET (DOSE REDUCTION) TABLET ORAL
Qty: 90 | Refills: 1 | OUTPATIENT

## 2021-05-28 RX ORDER — VALACYCLOVIR 500 MG/1
1 TABLET TABLET, FILM COATED ORAL BID PRN
Status: ACTIVE | COMMUNITY

## 2021-05-28 NOTE — HPI-TODAY'S VISIT:
Patient is a 31-year-old female seen by Dr. Ryan at the [place] office  after 2021 visit and referred for aih recently diagnosed.  She is feeling well.  She is working from home and trying to stay there little longer.  She did labs this week for me and pending and will ask mynor.  May 13 labs: lipase 28 down from 35. Tb 0.4 and alk 140 up from 115 and ast 45 and alt 35 (ast 37 and alt 29) so little up. Na 140 and k 3.8 and glu 86 and bun 11 and cr 0.75. wbfc 6.6 hg 10.9 from 11 and plat 262.  Mcv 89.neutrophils 3.4. She was pred 15mg a day and on the aza 50mg po qd and radha 300mg po bid.  Talked before the mmf switch option but planning possible eventual pregnancy.  2021 labs show white blood cell count 8.6 hemoglobin 11 platelets 272.  Previously hemoglobin was 11.6 and prior to that was 10.8.  At this time is still slightly low on hg.  Neutrophils are normal at 6.7.  AST noted to be 37 and ALT 29 previously 26 and 24 respectively.  Alkaline phosphatase down to 115 from 124 and previously 206.  Bilirubin 0.6 which is normal.  Albumin slightly low at 3.7.  Sodium 136 potassium 3.7 glucose 98 BUN of 11 creatinine 0.74.  Lipase 35.  Per notes these are the 1 week labs on the 15 mg dose.    2021 labs show white blood cell count 10 hemoglobin 11.6 platelet count 209.  Good to see the hemoglobin up and it used to be 10.8 and prior was 10.4 so that is doing better.  MCV is 89.  Neutrophils are 4.5.  Sodium was 139 potassium 3.5 chloride 102 CO2 25 albumin 4.0 bilirubin 0.3 alkaline phosphatase down to 124 from 206 and prior 354.  AST down to 26 ALT 24 and previously AST 36 and ALT 50 and prior to that  and  so labs were much lower.  Ideal ALT is less than 25.  Glucose 101 BUN of 18 creatinine 0.68.  Lipase was 43.   : Igg 4261 elevated but others like iga 243 and igm 164 and ige 66 are normal. Lipase 29.  Glu 94 and cr 0.78 and na 133 and k 4.4 and cl 100 and co2 23 and ca 9.3 and alb 4.0 and tb 0.7 and alk 206 and prior ast 354 so lower. Ast 36 and alt 50 and prior ast 318 and alt 259 so much lower. Wbc 6.6 and hg 10.8 and plat 223. Prior hg 10.4. total protein high 9.6 also and show local md.   Weight is 120 and from 105 pounds 47.7 kg would give 50mg azathioprine as least preg risk for her.  AZA has better pregnancy rating but can cause pancreatitis and we would watch and little lower risk if get pregant.  Aza now opn for a month for her.   Recap of initial visit info:  Patient is noted to have abnormal liver labs there were noted late   She was referred by Dr. Bobo to see him and in late  she had an AST of 319  and repeat labs then showed an  and .  She says he was a new doctor for her and she had just started to see the primary md.  She said had gone in for pap test and found labs abn.  She does not recall liver lab issue prior.  Initially she had been told she may have had gallbladder disease  but no symptoms and not prior liver disease personally.  She says in family had a second cousin female and 49 when  (she was ill and no insurance and so not tested) from cirrhosis and 2 uncles with cirrhosis possibly from alcohol and passed.  Dr Ryan when he saw: She mentioned rare alcohol usage maybe 3 times a month and very social only. She had stopped that.  She has stage 3 fibrosis and should not drink with hx.  She reviewed bx with [facility].  She did state prirot that she was on some marijuana daily prior but then had switched to once a week since then and since almost stopped and stressed can inc lfts and fibrosis.  Talked about older data that marijuana may advance fibrosis in pts who had hcv is from 1980s and so generally recommended to be avoided due to risk of fibrosis there. Some data since then with cbd oil for liver labs and and fibrosis to be off.  Occasional issues with herpes for which she would take acyclovir for outbreaks.  BMI was actually low at 19.02.  weight up 10 pounds.  She was suspected to have autoimmune hepatitis and was set up for biopsy by Dr Ryan.  Most recent laboratories we saw are from 2021 that showed a ceruloplasmin of 32.5 which was normal gamma GTP of 407 which is elevated smooth muscle antibody of 162 which is elevated AMA that is negative at less than 20 glucose of 93 BUN of 9 creatinine 0.71 sodium 136 potassium 4.1 chloride 105 CO2 of 23 albumin slightly low at 3.5 bilirubin 0.6 alk phos 354   hepatitis A IgM negative B surface antigen negative B core IgM negative hep C antibody negative HUE was positive.  Alpha-1 was negative for any mutation.  White blood cell count was 5.5 hemoglobin 10.4 (she says is little anemic) platelet count was 199 MCV was 90 neutrophils were 2.5 hemochromatosis assay was done and was no mutation noted.  Pathology report back from [facility] with a biopsy done on 2021 showing interface hepatitis with numerous plasma cells grade 3 (one part said grade 4)  and septal fibrosis stage III.  Findings were consistent with autoimmune hepatitis per the pathologist.  No diastase resistant globular inclusions were noted.  They did feel that inflammation was grade 4 inflammation on the body of the report and that the fibrosis with stage III steatosis was absent iron deposition was 0.  [facility] ultrasound done on 2021 showed the liver to be normal, pancreas normal were seen gallbladder showed no evidence inflammation or gallstone common bile duct showed no duct dilation right kidney showed no hydronephrosis overall no impression of abnormality was seen.  She with regards to kids is not planning any more kids. She is sexually active and aware that steroid sparing agents have some risk for having children. Less with aza than with cellcept.  Need to talk to gyn re long term re  prophylaxis with the aih.  She has one 6 yr old boy.  She did the covid 19 vaccine pfizer.  Yomi was taking some green tea and she has Canlife green tea has one a day. Turmeric not using. No elderberry.  She said green tea had been using it occ and she has been on it more so after the biopsy. It can trigger aih and immune issues.  ISP sparing agent to limit. AZA is the least dangerous one but cellcept stronger but has more birth defects risk.  Plan 1.  Stay for now on the azathioprine 50mg po qd and watching for toxicity.  2. See what the labs are. 3. redo the labs in 2 and 4 weeks. 4. Not drop steroids unless settled.  5.  Need to Check Hepatitis a and B Immunity check. 6. Avoid the green tea.  Cautioned pt re the pandemic to use strict hand washing, wear mask even if the covid 19 vaccine has been obtained, socially distance, and stay on the cdc web page to look for updates as this a moving target and lost of updates coming at us.  Duration of the visit was 31 min via healow with the pt with greater than 50% of time spent reviewing chart and events with the pt and her fiancee who was there by computer video/chat.  Addendum: May 27 2021 labs: wbc 7.3 hg 10.3 and priro 10.9 and plat 265. mcv 91(finsihed cycle) glu 83 and cr 0.69 and na 140 and k 3.8 and cl 102 and co2 23 and alb 4.0 and tb 0.4 and alk 110 and ast 34 and alt 23 ( alk 140 and ast 45 and alt 35). lipase 26.  Plan redo the labs in 2 weeks and then drop to 10mg a day.

## 2021-05-28 NOTE — HPI-TODAY'S VISIT:
May 27 labs show white blood cell count 7.3 hemoglobin 10.3 platelet count 265.  Hemoglobin as we discussed was a little bit lower from prior result of 10.9 and prior to that 11.  You mentioned that the lab was done right after your recent menstrual cycle so that could be explaining them.  Neutrophils normal at 5.6. Glucose 83 BUN of 10 creatinine 0.69 sodium 140 potassium 3.6 calcium 9.4 albumin 4.0 bili 0.3 alkaline phosphatase 110 down from 140.  AST 34 down from 45 ALT 23 down from 35.  Overall these laboratories are much better.  Lipase was 26. As we discussed I would repeat the labs in 2 4 and 6 weeks and if the labs in 2 weeks are stable then we can talk about decreasing the prednisone further as well wanting to make sure that everything is settled down. Dr. Isbell

## 2021-06-07 ENCOUNTER — LAB OUTSIDE AN ENCOUNTER (OUTPATIENT)
Dept: URBAN - METROPOLITAN AREA TELEHEALTH 2 | Facility: TELEHEALTH | Age: 31
End: 2021-06-07

## 2021-06-21 ENCOUNTER — LAB OUTSIDE AN ENCOUNTER (OUTPATIENT)
Dept: URBAN - METROPOLITAN AREA TELEHEALTH 2 | Facility: TELEHEALTH | Age: 31
End: 2021-06-21

## 2021-07-02 ENCOUNTER — ERX REFILL RESPONSE (OUTPATIENT)
Dept: URBAN - METROPOLITAN AREA CLINIC 86 | Facility: CLINIC | Age: 31
End: 2021-07-02

## 2021-07-02 RX ORDER — AZATHIOPRINE 50 1/1
TAKE 1 TABLET(50 MG) BY MOUTH EVERY DAY TABLET ORAL ONCE A DAY
Qty: 30 | Refills: 1 | OUTPATIENT

## 2021-07-02 RX ORDER — AZATHIOPRINE 50 1/1
TAKE 1 TABLET(50 MG) BY MOUTH EVERY DAY TABLET ORAL ONCE A DAY
Qty: 90 | Refills: 0 | OUTPATIENT

## 2021-07-05 ENCOUNTER — LAB OUTSIDE AN ENCOUNTER (OUTPATIENT)
Dept: URBAN - METROPOLITAN AREA TELEHEALTH 2 | Facility: TELEHEALTH | Age: 31
End: 2021-07-05

## 2021-07-15 ENCOUNTER — OFFICE VISIT (OUTPATIENT)
Dept: URBAN - METROPOLITAN AREA CLINIC 16 | Facility: CLINIC | Age: 31
End: 2021-07-15
Payer: COMMERCIAL

## 2021-07-15 ENCOUNTER — TELEPHONE ENCOUNTER (OUTPATIENT)
Dept: URBAN - METROPOLITAN AREA CLINIC 92 | Facility: CLINIC | Age: 31
End: 2021-07-15

## 2021-07-15 DIAGNOSIS — R94.5 ABNORMAL LFTS: ICD-10-CM

## 2021-07-15 DIAGNOSIS — K75.4 AUTOIMMUNE HEPATITIS: ICD-10-CM

## 2021-07-15 PROCEDURE — 93975 VASCULAR STUDY: CPT

## 2021-07-15 PROCEDURE — 76705 ECHO EXAM OF ABDOMEN: CPT

## 2021-07-15 RX ORDER — AZATHIOPRINE 50 1/1
TAKE 1 TABLET(50 MG) BY MOUTH EVERY DAY TABLET ORAL ONCE A DAY
Qty: 90 | Refills: 0 | Status: ACTIVE | COMMUNITY

## 2021-07-15 RX ORDER — URSODIOL 300 MG/1
1 CAPSULE CAPSULE ORAL
Qty: 60 | Refills: 1 | Status: ACTIVE | COMMUNITY

## 2021-07-15 RX ORDER — PREDNISONE 5 MG/1
3 TABLET (DOSE REDUCTION) TABLET ORAL
Qty: 90 | Refills: 1 | Status: ACTIVE | COMMUNITY

## 2021-07-15 RX ORDER — VALACYCLOVIR 500 MG/1
1 TABLET TABLET, FILM COATED ORAL BID PRN
Status: ACTIVE | COMMUNITY

## 2021-07-15 NOTE — HPI-TODAY'S VISIT:
Beverley Combs, 515 ultrasound shows the partially visualized pancreas to be unremarkable.  Liver has a coarsened echotexture to its parenchyma and with mild surface nodularity.  No focal mass seen. Gallbladder was thin-walled and the common bile duct was normal at 2 mm.  Right kidney measured 8.5 cm with no hydronephrosis.  Spleen normal 10 cm.  No ascites seen.  Liver vessels were patent. They overall felt that your liver parenchyma appeared to have cirrhotic features and with no focal mass. Dr. Isbell

## 2021-07-21 ENCOUNTER — OFFICE VISIT (OUTPATIENT)
Dept: URBAN - METROPOLITAN AREA TELEHEALTH 2 | Facility: TELEHEALTH | Age: 31
End: 2021-07-21

## 2021-07-29 ENCOUNTER — TELEPHONE ENCOUNTER (OUTPATIENT)
Dept: URBAN - METROPOLITAN AREA CLINIC 92 | Facility: CLINIC | Age: 31
End: 2021-07-29

## 2021-07-29 RX ORDER — AZATHIOPRINE 50 1/1
TAKE 1 TABLET(50 MG) BY MOUTH EVERY DAY TABLET ORAL ONCE A DAY
Qty: 90 | Refills: 0
End: 2021-10-27

## 2021-08-02 ENCOUNTER — LAB OUTSIDE AN ENCOUNTER (OUTPATIENT)
Dept: URBAN - METROPOLITAN AREA TELEHEALTH 2 | Facility: TELEHEALTH | Age: 31
End: 2021-08-02

## 2021-08-11 ENCOUNTER — TELEPHONE ENCOUNTER (OUTPATIENT)
Dept: URBAN - METROPOLITAN AREA CLINIC 92 | Facility: CLINIC | Age: 31
End: 2021-08-11

## 2021-08-11 LAB
A/G RATIO: 1.4
ALBUMIN: 4.6
ALKALINE PHOSPHATASE: 88
ALT (SGPT): 17
AST (SGOT): 28
BASO (ABSOLUTE): 0
BASOS: 1
BILIRUBIN, TOTAL: 0.3
BUN/CREATININE RATIO: 10
BUN: 8
CALCIUM: 9.4
CARBON DIOXIDE, TOTAL: 21
CHLORIDE: 104
CREATININE: 0.78
EGFR IF AFRICN AM: 116
EGFR IF NONAFRICN AM: 101
EOS (ABSOLUTE): 0
EOS: 0
GLOBULIN, TOTAL: 3.4
GLUCOSE: 109
HEMATOCRIT: 33.9
HEMATOLOGY COMMENTS:: (no result)
HEMOGLOBIN: 10.8
IMMATURE CELLS: (no result)
IMMATURE GRANS (ABS): 0
IMMATURE GRANULOCYTES: 1
LIPASE: 31
LYMPHS (ABSOLUTE): 0.7
LYMPHS: 13
MCH: 27.6
MCHC: 31.9
MCV: 87
MONOCYTES(ABSOLUTE): 0.2
MONOCYTES: 3
NEUTROPHILS (ABSOLUTE): 4.4
NEUTROPHILS: 82
NRBC: (no result)
PLATELETS: 233
POTASSIUM: 4.5
PROTEIN, TOTAL: 8
RBC: 3.92
RDW: 15.4
SODIUM: 138
WBC: 5.3

## 2021-08-11 NOTE — HPI-TODAY'S VISIT:
Dear Paty Combs, August 10 labs show white cell count 5.3 hemoglobin 10.8 up from 10.3 previously but still a little low.  Normals from 11.1 up to 15.9.  Please share with primary provider.  Platelet count 233.  MCV normal at 87.  Neutrophils normal at 4.4 glucose 109 slightly up previously was 83.  Maybe you were not fasting?  BUN of 8 creatinine 0.78 sodium 138 potassium 4.5 calcium 9.4 albumin 4.6 bilirubin normal at 0.3.  Globulin normal at 3.4.  AST 28 ALT 17 he was seen AST 34 and ALT 23.  Alkaline phosphatase 88 down from 110 and previously 140.  Lipase normal at 31. Prednisone 15mg po qd, aza 50mg po qd and ursodiol 300mg po bid. Confirmed today with pt. Plan is to drop the Prednisone  to10mg po qd and keep other meds the same and do labs in 2 and 4 weeks. We will see her in Sept. Dr Isbell

## 2021-08-23 ENCOUNTER — LAB OUTSIDE AN ENCOUNTER (OUTPATIENT)
Dept: URBAN - METROPOLITAN AREA CLINIC 92 | Facility: CLINIC | Age: 31
End: 2021-08-23

## 2021-08-31 ENCOUNTER — TELEPHONE ENCOUNTER (OUTPATIENT)
Dept: URBAN - METROPOLITAN AREA CLINIC 92 | Facility: CLINIC | Age: 31
End: 2021-08-31

## 2021-08-31 LAB
A/G RATIO: 1.4
ALBUMIN: 4.6
ALKALINE PHOSPHATASE: 90
ALT (SGPT): 25
AST (SGOT): 35
BASO (ABSOLUTE): 0
BASOS: 0
BILIRUBIN, TOTAL: 0.4
BUN/CREATININE RATIO: 16
BUN: 11
CALCIUM: 9.5
CARBON DIOXIDE, TOTAL: 20
CHLORIDE: 103
CREATININE: 0.68
EGFR IF AFRICN AM: 134
EGFR IF NONAFRICN AM: 116
EOS (ABSOLUTE): 0
EOS: 0
GLOBULIN, TOTAL: 3.2
GLUCOSE: 106
HEMATOCRIT: 34.6
HEMATOLOGY COMMENTS:: (no result)
HEMOGLOBIN: 10.9
IMMATURE CELLS: (no result)
IMMATURE GRANS (ABS): 0.1
IMMATURE GRANULOCYTES: 1
LIPASE: 28
LYMPHS (ABSOLUTE): 0.8
LYMPHS: 10
MCH: 27
MCHC: 31.5
MCV: 86
MONOCYTES(ABSOLUTE): 0.5
MONOCYTES: 6
NEUTROPHILS (ABSOLUTE): 7
NEUTROPHILS: 83
NRBC: (no result)
PLATELETS: 302
POTASSIUM: 4.4
PROTEIN, TOTAL: 7.8
RBC: 4.03
RDW: 15.7
SODIUM: 136
WBC: 8.4

## 2021-08-31 NOTE — HPI-TODAY'S VISIT:
Dear Paty Combs, August 30 labs show normal lipase of 28 previously 31.  Glucose still slightly up at 106 previously 109 BUN of 11 creatinine 0.68 sodium 136 potassium 4.4 calcium 9.5 albumin 4.6 bilirubin 0.4 alkaline phosphatase 90 AST 35 ALT 25.  Prior AST 28 and ALT 17 so slightly higher.  Prior alkaline phosphatase 88. White blood cell count 8.4 hemoglobin trending better at 10.9 but still low but better than prior10.8 and prior 10.3.  MCV normal 86 platelet count 302 up from 233.  Neutrophils normal at 7 lymphocytes 0.8. Per notes this is on the lower dose of the prednisone 10 mg a day. Given the flux seen I would repeat the labs again in 2 weeks.  May take you a little bit longer to settle down at this dose. If does not settle down we need to look at switching the azathioprine to something stronger but I recall you were considering having more children.  Ursodiol dosing also could go up a little. You have an appointment coming up tomorrow with Camille and she can review this further with you. Dr. Isbell

## 2021-09-01 ENCOUNTER — OFFICE VISIT (OUTPATIENT)
Dept: URBAN - METROPOLITAN AREA TELEHEALTH 2 | Facility: TELEHEALTH | Age: 31
End: 2021-09-01
Payer: COMMERCIAL

## 2021-09-01 VITALS — BODY MASS INDEX: 22.08 KG/M2 | HEIGHT: 62 IN | WEIGHT: 120 LBS

## 2021-09-01 DIAGNOSIS — K75.4 AUTOIMMUNE HEPATITIS: ICD-10-CM

## 2021-09-01 DIAGNOSIS — R74.8 ABNORMAL LIVER ENZYMES: ICD-10-CM

## 2021-09-01 DIAGNOSIS — Z79.899 HIGH RISK MEDICATION USE: ICD-10-CM

## 2021-09-01 DIAGNOSIS — R76.9 ABNORMAL IMMUNOLOGICAL FINDING IN SERUM: ICD-10-CM

## 2021-09-01 PROCEDURE — 99214 OFFICE O/P EST MOD 30 MIN: CPT

## 2021-09-01 RX ORDER — AZATHIOPRINE 50 1/1
TAKE 1 TABLET(50 MG) BY MOUTH EVERY DAY TABLET ORAL ONCE A DAY
Qty: 30 | Refills: 1 | OUTPATIENT

## 2021-09-01 RX ORDER — AZATHIOPRINE 50 1/1
TAKE 1 TABLET(50 MG) BY MOUTH EVERY DAY TABLET ORAL ONCE A DAY
Qty: 90 | Refills: 0 | Status: ACTIVE | COMMUNITY
End: 2021-10-27

## 2021-09-01 RX ORDER — URSODIOL 300 MG/1
1 CAPSULE CAPSULE ORAL
Qty: 60 | Refills: 1 | Status: ACTIVE | COMMUNITY

## 2021-09-01 RX ORDER — PREDNISONE 5 MG/1
3 TABLET (DOSE REDUCTION) TABLET ORAL
Qty: 90 | Refills: 1 | Status: ACTIVE | COMMUNITY

## 2021-09-01 RX ORDER — VALACYCLOVIR 500 MG/1
1 TABLET TABLET, FILM COATED ORAL BID PRN
Status: ACTIVE | COMMUNITY

## 2021-09-01 RX ORDER — URSODIOL 300 MG/1
1 CAPSULE CAPSULE ORAL
Qty: 60 | Refills: 1 | OUTPATIENT

## 2021-09-01 NOTE — HPI-TODAY'S VISIT:
Did telehealth visit with pt. overall doing well. labs slightly bumped suspect from yeast infection and had to take OTC insert for this. states this happens prior to her cycles and suspect from steroid use but adviced to follow up with ob/gyn.  started pred 10mg around 8/10 and will continue on this dose for now.   Dr. Isbell letter to pt: August 30 labs show normal lipase of 28 previously 31.  Glucose still slightly up at 106 previously 109 BUN of 11 creatinine 0.68 sodium 136 potassium 4.4 calcium 9.5 albumin 4.6 bilirubin 0.4 alkaline phosphatase 90 AST 35 ALT 25.  Prior AST 28 and ALT 17 so slightly higher.  Prior alkaline phosphatase 88.   White blood cell count 8.4 hemoglobin trending better at 10.9 but still low but better than prior10.8 and prior 10.3.  MCV normal 86 platelet count 302 up from 233.  Neutrophils normal at 7 lymphocytes 0.8. Per notes this is on the lower dose of the prednisone 10 mg a day. Given the flux seen I would repeat the labs again in 2 weeks.  May take you a little bit longer to settle down at this dose. If does not settle down we need to look at switching the azathioprine to something stronger but I recall you were considering having more children.  Ursodiol dosing also could go up a little. You have an appointment coming up tomorrow with Camille and she can review this further with you.  july 2021 u/s: ultrasound shows the partially visualized pancreas to be unremarkable.  Liver has a coarsened echotexture to its parenchyma and with mild surface nodularity.  No focal mass seen. Gallbladder was thin-walled and the common bile duct was normal at 2 mm.  Right kidney measured 8.5 cm with no hydronephrosis.  Spleen normal 10 cm.  No ascites seen.  Liver vessels were patent. They overall felt that your liver parenchyma appeared to have cirrhotic features and with no focal mass.  August 10 labs show white cell count 5.3 hemoglobin 10.8 up from 10.3 previously but still a little low.  Normals from 11.1 up to 15.9.  Please share with primary provider.  Platelet count 233.  MCV normal at 87.  Neutrophils normal at 4.4 glucose 109 slightly up previously was 83.  Maybe you were not fasting?  BUN of 8 creatinine 0.78 sodium 138 potassium 4.5 calcium 9.4 albumin 4.6 bilirubin normal at 0.3.  Globulin normal at 3.4.  AST 28 ALT 17 he was seen AST 34 and ALT 23.  Alkaline phosphatase 88 down from 110 and previously 140.  Lipase normal at 31. Prednisone 15mg po qd, aza 50mg po qd and ursodiol 300mg po bid. Confirmed today with pt. Plan is to drop the Prednisone  to10mg po qd and keep other meds the same and do labs in 2 and 4 weeks. We will see her in Sept.

## 2021-09-06 ENCOUNTER — LAB OUTSIDE AN ENCOUNTER (OUTPATIENT)
Dept: URBAN - METROPOLITAN AREA CLINIC 92 | Facility: CLINIC | Age: 31
End: 2021-09-06

## 2021-09-14 ENCOUNTER — TELEPHONE ENCOUNTER (OUTPATIENT)
Dept: URBAN - METROPOLITAN AREA CLINIC 92 | Facility: CLINIC | Age: 31
End: 2021-09-14

## 2021-09-14 LAB
A/G RATIO: 1.3
ALBUMIN: 4.1
ALKALINE PHOSPHATASE: 76
ALT (SGPT): 15
AST (SGOT): 23
BASO (ABSOLUTE): 0
BASOS: 1
BILIRUBIN, TOTAL: 0.3
BUN/CREATININE RATIO: 11
BUN: 7
CALCIUM: 9.1
CARBON DIOXIDE, TOTAL: 21
CHLORIDE: 103
CREATININE: 0.65
EGFR IF AFRICN AM: 136
EGFR IF NONAFRICN AM: 118
EOS (ABSOLUTE): 0
EOS: 0
GLOBULIN, TOTAL: 3.1
GLUCOSE: 99
HEMATOCRIT: 34.6
HEMATOLOGY COMMENTS:: (no result)
HEMOGLOBIN: 10.8
IMMATURE CELLS: (no result)
IMMATURE GRANS (ABS): 0
IMMATURE GRANULOCYTES: 1
LIPASE: 28
LYMPHS (ABSOLUTE): 1.6
LYMPHS: 22
MCH: 26.7
MCHC: 31.2
MCV: 85
MONOCYTES(ABSOLUTE): 0.4
MONOCYTES: 6
NEUTROPHILS (ABSOLUTE): 5.1
NEUTROPHILS: 70
NRBC: (no result)
PLATELETS: 184
POTASSIUM: 3.5
PROTEIN, TOTAL: 7.2
RBC: 4.05
RDW: 16.6
SODIUM: 136
WBC: 7.2

## 2021-09-15 ENCOUNTER — LAB OUTSIDE AN ENCOUNTER (OUTPATIENT)
Dept: URBAN - METROPOLITAN AREA TELEHEALTH 2 | Facility: TELEHEALTH | Age: 31
End: 2021-09-15

## 2021-09-20 ENCOUNTER — TELEPHONE ENCOUNTER (OUTPATIENT)
Dept: URBAN - METROPOLITAN AREA CLINIC 86 | Facility: CLINIC | Age: 31
End: 2021-09-20

## 2021-09-20 ENCOUNTER — TELEPHONE ENCOUNTER (OUTPATIENT)
Dept: URBAN - METROPOLITAN AREA CLINIC 92 | Facility: CLINIC | Age: 31
End: 2021-09-20

## 2021-09-21 ENCOUNTER — TELEPHONE ENCOUNTER (OUTPATIENT)
Dept: URBAN - METROPOLITAN AREA CLINIC 92 | Facility: CLINIC | Age: 31
End: 2021-09-21

## 2021-09-21 ENCOUNTER — ERX REFILL RESPONSE (OUTPATIENT)
Dept: URBAN - METROPOLITAN AREA CLINIC 86 | Facility: CLINIC | Age: 31
End: 2021-09-21

## 2021-09-21 LAB
A/G RATIO: 1.4
ALBUMIN: 3.9
ALKALINE PHOSPHATASE: 76
ALT (SGPT): 27
AST (SGOT): 29
BASO (ABSOLUTE): 0
BASOS: 0
BILIRUBIN, TOTAL: 0.2
BUN/CREATININE RATIO: 14
BUN: 8
CALCIUM: 9.3
CARBON DIOXIDE, TOTAL: 21
CHLORIDE: 102
CREATININE: 0.59
EGFR IF AFRICN AM: 140
EGFR IF NONAFRICN AM: 122
EOS (ABSOLUTE): 0
EOS: 0
GLOBULIN, TOTAL: 2.7
GLUCOSE: 83
HEMATOCRIT: 32.3
HEMATOLOGY COMMENTS:: (no result)
HEMOGLOBIN: 10.4
IMMATURE CELLS: (no result)
IMMATURE GRANS (ABS): 0.1
IMMATURE GRANULOCYTES: 1
LIPASE: 22
LYMPHS (ABSOLUTE): 2
LYMPHS: 27
MCH: 27.8
MCHC: 32.2
MCV: 86
MONOCYTES(ABSOLUTE): 0.7
MONOCYTES: 10
NEUTROPHILS (ABSOLUTE): 4.7
NEUTROPHILS: 62
NRBC: (no result)
PLATELETS: 204
POTASSIUM: 3.4
PROTEIN, TOTAL: 6.6
RBC: 3.74
RDW: 17.2
SODIUM: 137
WBC: 7.6

## 2021-09-21 RX ORDER — URSODIOL 300 MG/1
1 CAPSULE TWICE A DAY WITH FOOD ORALLY 30 DAY(S) CAPSULE ORAL
Qty: 60 CAPSULE | Refills: 2 | OUTPATIENT

## 2021-09-21 RX ORDER — URSODIOL 300 MG/1
1 CAPSULE CAPSULE ORAL
Qty: 60 | Refills: 1 | OUTPATIENT

## 2021-09-23 ENCOUNTER — OFFICE VISIT (OUTPATIENT)
Dept: URBAN - METROPOLITAN AREA CLINIC 86 | Facility: CLINIC | Age: 31
End: 2021-09-23

## 2021-09-23 RX ORDER — AZATHIOPRINE 50 1/1
TAKE 1 TABLET(50 MG) BY MOUTH EVERY DAY TABLET ORAL ONCE A DAY
Qty: 30 | Refills: 1 | Status: ACTIVE | COMMUNITY

## 2021-09-23 RX ORDER — AZATHIOPRINE 50 1/1
TAKE 1 TABLET(50 MG) BY MOUTH EVERY DAY TABLET ORAL ONCE A DAY
Qty: 90 | Refills: 0 | Status: ACTIVE | COMMUNITY
End: 2021-10-27

## 2021-09-23 RX ORDER — URSODIOL 300 MG/1
1 CAPSULE TWICE A DAY WITH FOOD ORALLY 30 DAY(S) CAPSULE ORAL
Qty: 60 CAPSULE | Refills: 2 | Status: ACTIVE | COMMUNITY

## 2021-09-23 RX ORDER — URSODIOL 300 MG/1
1 CAPSULE CAPSULE ORAL
Qty: 60 | Refills: 1 | OUTPATIENT

## 2021-09-23 RX ORDER — VALACYCLOVIR 500 MG/1
1 TABLET TABLET, FILM COATED ORAL BID PRN
Status: ACTIVE | COMMUNITY

## 2021-09-23 RX ORDER — PREDNISONE 5 MG/1
3 TABLET (DOSE REDUCTION) TABLET ORAL
Qty: 90 | Refills: 1 | Status: ACTIVE | COMMUNITY

## 2021-09-23 RX ORDER — AZATHIOPRINE 50 1/1
TAKE 1 TABLET(50 MG) BY MOUTH EVERY DAY TABLET ORAL ONCE A DAY
Qty: 30 | Refills: 1 | OUTPATIENT

## 2021-09-23 NOTE — HPI-TODAY'S VISIT:
Pt is a 31 year old female who presents today for follow up of AIH, currently pregnant.    Pt recently found out she is pregnant on 9/13/21.  She stopped AZA on ***  (Previously on  azathioprine 50 mg tablet), continuing on  prednisone 10 mg, radha 300 mg bid for AIH.  Recent labs 9/20/21- hgb low 10.4, plt 204, cr 0.59. potassium low 3.4. ast 29, alt 27. lipase 22.   Naresh Isbell 09/21/2021 08:27:35 AM EDT > she is on enough now to control and imuran slow acting so likely helping some as off for one week or less. needs to see ob and follow course and best to have her come in and discuss this in person and hopefully can get into ob team soon. could try to inc radha a little to see if can get by with lower steroids and radha for little longer but hesistant to change steroids much until plan clear. if flares a risk for her.  started pred 10mg around 8/10 and will continue on this dose for now.   August 30 labs show normal lipase of 28 previously 31.  Glucose still slightly up at 106 previously 109 BUN of 11 creatinine 0.68 sodium 136 potassium 4.4 calcium 9.5 albumin 4.6 bilirubin 0.4 alkaline phosphatase 90 AST 35 ALT 25.  Prior AST 28 and ALT 17 so slightly higher.  Prior alkaline phosphatase 88.   White blood cell count 8.4 hemoglobin trending better at 10.9 but still low but better than prior10.8 and prior 10.3.  MCV normal 86 platelet count 302 up from 233.  Neutrophils normal at 7 lymphocytes 0.8. Per notes this is on the lower dose of the prednisone 10 mg a day. Given the flux seen I would repeat the labs again in 2 weeks.  May take you a little bit longer to settle down at this dose. If does not settle down we need to look at switching the azathioprine to something stronger but I recall you were considering having more children.  Ursodiol dosing also could go up a little. You have an appointment coming up tomorrow with Camille and she can review this further with you.  july 2021 u/s: ultrasound shows the partially visualized pancreas to be unremarkable.  Liver has a coarsened echotexture to its parenchyma and with mild surface nodularity.  No focal mass seen. Gallbladder was thin-walled and the common bile duct was normal at 2 mm.  Right kidney measured 8.5 cm with no hydronephrosis.  Spleen normal 10 cm.  No ascites seen.  Liver vessels were patent. They overall felt that your liver parenchyma appeared to have cirrhotic features and with no focal mass.  August 10 labs show white cell count 5.3 hemoglobin 10.8 up from 10.3 previously but still a little low.  Normals from 11.1 up to 15.9.  Please share with primary provider.  Platelet count 233.  MCV normal at 87.  Neutrophils normal at 4.4 glucose 109 slightly up previously was 83.  Maybe you were not fasting?  BUN of 8 creatinine 0.78 sodium 138 potassium 4.5 calcium 9.4 albumin 4.6 bilirubin normal at 0.3.  Globulin normal at 3.4.  AST 28 ALT 17 he was seen AST 34 and ALT 23.  Alkaline phosphatase 88 down from 110 and previously 140.  Lipase normal at 31. Prednisone 15mg po qd, aza 50mg po qd and ursodiol 300mg po bid. Confirmed today with pt. Plan is to drop the Prednisone  to10mg po qd and keep other meds the same and do labs in 2 and 4 weeks. We will see her in Sept.\

## 2021-09-28 ENCOUNTER — OFFICE VISIT (OUTPATIENT)
Dept: URBAN - METROPOLITAN AREA CLINIC 86 | Facility: CLINIC | Age: 31
End: 2021-09-28
Payer: COMMERCIAL

## 2021-09-28 VITALS
SYSTOLIC BLOOD PRESSURE: 123 MMHG | DIASTOLIC BLOOD PRESSURE: 75 MMHG | BODY MASS INDEX: 22.08 KG/M2 | HEIGHT: 62 IN | TEMPERATURE: 97.7 F | HEART RATE: 118 BPM | WEIGHT: 120 LBS

## 2021-09-28 DIAGNOSIS — R76.9 ABNORMAL IMMUNOLOGICAL FINDING IN SERUM: ICD-10-CM

## 2021-09-28 DIAGNOSIS — Z79.899 HIGH RISK MEDICATION USE: ICD-10-CM

## 2021-09-28 DIAGNOSIS — Z98.890 HISTORY OF LIVER BIOPSY: ICD-10-CM

## 2021-09-28 DIAGNOSIS — R00.0 TACHYCARDIA: ICD-10-CM

## 2021-09-28 DIAGNOSIS — R74.8 ABNORMAL LIVER ENZYMES: ICD-10-CM

## 2021-09-28 DIAGNOSIS — K75.4 AUTOIMMUNE HEPATITIS: ICD-10-CM

## 2021-09-28 DIAGNOSIS — Z71.89 VACCINE COUNSELING: ICD-10-CM

## 2021-09-28 PROCEDURE — 99214 OFFICE O/P EST MOD 30 MIN: CPT

## 2021-09-28 RX ORDER — VALACYCLOVIR 500 MG/1
1 TABLET TABLET, FILM COATED ORAL BID PRN
Status: ACTIVE | COMMUNITY

## 2021-09-28 RX ORDER — PREDNISONE 5 MG/1
1 TABLET TABLET ORAL ONCE A DAY
Status: ACTIVE | COMMUNITY

## 2021-09-28 RX ORDER — AZATHIOPRINE 50 1/1
TAKE 1 TABLET(50 MG) BY MOUTH EVERY DAY TABLET ORAL ONCE A DAY
Qty: 30 | Refills: 1 | OUTPATIENT

## 2021-09-28 RX ORDER — URSODIOL 300 MG/1
1 CAPSULE CAPSULE ORAL
Qty: 60 | Refills: 1 | OUTPATIENT

## 2021-09-28 RX ORDER — PREDNISONE 5 MG/1
3 TABLET (DOSE REDUCTION) TABLET ORAL
Qty: 90 | Refills: 1 | Status: ACTIVE | COMMUNITY

## 2021-09-28 RX ORDER — AZATHIOPRINE 50 1/1
TAKE 1 TABLET(50 MG) BY MOUTH EVERY DAY TABLET ORAL ONCE A DAY
Qty: 90 | Refills: 0 | Status: ACTIVE | COMMUNITY
End: 2021-10-27

## 2021-09-28 RX ORDER — URSODIOL 300 MG/1
1 CAPSULE TWICE A DAY WITH FOOD ORALLY 30 DAY(S) CAPSULE ORAL
Qty: 60 CAPSULE | Refills: 2 | Status: ACTIVE | COMMUNITY

## 2021-09-28 NOTE — HPI-TODAY'S VISIT:
Pt is a 31 year old female who presents today for follow up of AIH, currently pregnant-9 weeks.         Pt recently found out she is pregnant on 9/13/21.  Recent labs 9/20/21- hgb low 10.4, plt 204, cr 0.59. potassium low 3.4. ast 29, alt 27. lipase 22. Pt saw dr. Wakefield and has restarted imuran 2 days after she stopped. prednisone 10mg 8/10/21 and radha bid for now. will do labs now.   she is on enough now to control and imuran slow acting so likely helping some as off for one week or less. needs to see ob and follow course and best to have her come in and discuss this in person and hopefully can get into ob team soon. could try to inc radha a little to see if can get by with lower steroids and radha for little longer but hesistant to change steroids much until plan clear. if flares a risk for her.  started pred 10mg around 8/10 and will continue on this dose for now.   August 30 labs show normal lipase of 28 previously 31.  Glucose still slightly up at 106 previously 109 BUN of 11 creatinine 0.68 sodium 136 potassium 4.4 calcium 9.5 albumin 4.6 bilirubin 0.4 alkaline phosphatase 90 AST 35 ALT 25.  Prior AST 28 and ALT 17 so slightly higher.  Prior alkaline phosphatase 88.   White blood cell count 8.4 hemoglobin trending better at 10.9 but still low but better than prior10.8 and prior 10.3.  MCV normal 86 platelet count 302 up from 233.  Neutrophils normal at 7 lymphocytes 0.8. Per notes this is on the lower dose of the prednisone 10 mg a day. Given the flux seen I would repeat the labs again in 2 weeks.  May take you a little bit longer to settle down at this dose.   july 2021 u/s: ultrasound shows the partially visualized pancreas to be unremarkable.  Liver has a coarsened echotexture to its parenchyma and with mild surface nodularity.  No focal mass seen. Gallbladder was thin-walled and the common bile duct was normal at 2 mm.  Right kidney measured 8.5 cm with no hydronephrosis.  Spleen normal 10 cm.  No ascites seen.  Liver vessels were patent. They overall felt that your liver parenchyma appeared to have cirrhotic features and with no focal mass.  August 10 labs show white cell count 5.3 hemoglobin 10.8 up from 10.3 previously but still a little low.  Normals from 11.1 up to 15.9.  Please share with primary provider.  Platelet count 233.  MCV normal at 87.  Neutrophils normal at 4.4 glucose 109 slightly up previously was 83.  Maybe you were not fasting?  BUN of 8 creatinine 0.78 sodium 138 potassium 4.5 calcium 9.4 albumin 4.6 bilirubin normal at 0.3.  Globulin normal at 3.4.  AST 28 ALT 17 he was seen AST 34 and ALT 23.  Alkaline phosphatase 88 down from 110 and previously 140.  Lipase normal at 31. Prednisone 15mg po qd, aza 50mg po qd and ursodiol 300mg po bid. Confirmed today with pt.

## 2021-09-29 ENCOUNTER — TELEPHONE ENCOUNTER (OUTPATIENT)
Dept: URBAN - METROPOLITAN AREA CLINIC 92 | Facility: CLINIC | Age: 31
End: 2021-09-29

## 2021-09-29 ENCOUNTER — ERX REFILL RESPONSE (OUTPATIENT)
Dept: URBAN - METROPOLITAN AREA TELEHEALTH 2 | Facility: TELEHEALTH | Age: 31
End: 2021-09-29

## 2021-09-29 ENCOUNTER — LAB OUTSIDE AN ENCOUNTER (OUTPATIENT)
Dept: URBAN - METROPOLITAN AREA TELEHEALTH 2 | Facility: TELEHEALTH | Age: 31
End: 2021-09-29

## 2021-09-29 LAB
A/G RATIO: 1.4
ALBUMIN: 4
ALKALINE PHOSPHATASE: 79
ALT (SGPT): 26
AST (SGOT): 36
BASO (ABSOLUTE): 0
BASOS: 0
BILIRUBIN, TOTAL: 0.2
BUN/CREATININE RATIO: 15
BUN: 11
CALCIUM: 9.1
CARBON DIOXIDE, TOTAL: 21
CHLORIDE: 106
CREATININE: 0.72
EGFR IF AFRICN AM: 128
EGFR IF NONAFRICN AM: 111
EOS (ABSOLUTE): 0
EOS: 0
GLOBULIN, TOTAL: 2.9
GLUCOSE: 93
HEMATOCRIT: 32
HEMATOLOGY COMMENTS:: (no result)
HEMOGLOBIN: 10.4
IMMATURE CELLS: (no result)
IMMATURE GRANS (ABS): 0
IMMATURE GRANULOCYTES: 1
LIPASE: 30
LYMPHS (ABSOLUTE): 0.9
LYMPHS: 12
MCH: 28
MCHC: 32.5
MCV: 86
MONOCYTES(ABSOLUTE): 0.4
MONOCYTES: 6
NEUTROPHILS (ABSOLUTE): 5.9
NEUTROPHILS: 81
NRBC: (no result)
PLATELETS: 187
POTASSIUM: 4
PROTEIN, TOTAL: 6.9
RBC: 3.71
RDW: 16.9
SODIUM: 138
WBC: 7.3

## 2021-09-29 RX ORDER — URSODIOL 300 MG/1
1 CAPSULE CAPSULE ORAL
Qty: 90 TABLET | Refills: 2 | OUTPATIENT

## 2021-09-29 RX ORDER — PREDNISONE 5 MG/1
1 TABLET TABLET ORAL BID
Qty: 60 TABLET | Refills: 2 | OUTPATIENT

## 2021-09-29 RX ORDER — PREDNISONE 5 MG/1
3 TABLET (DOSE REDUCTION) TABLET ORAL
Qty: 90 | Refills: 1 | OUTPATIENT

## 2021-10-09 LAB
A/G RATIO: 1.4
ALBUMIN: 4.1
ALKALINE PHOSPHATASE: 80
ALT (SGPT): 27
AST (SGOT): 30
BASO (ABSOLUTE): 0
BASOS: 0
BILIRUBIN, TOTAL: 0.2
BUN/CREATININE RATIO: 11
BUN: 8
CALCIUM: 9.9
CARBON DIOXIDE, TOTAL: 21
CHLORIDE: 101
CREATININE: 0.7
EGFR IF AFRICN AM: 133
EGFR IF NONAFRICN AM: 115
EOS (ABSOLUTE): 0
EOS: 0
GLOBULIN, TOTAL: 3
GLUCOSE: 102
HEMATOCRIT: 33.4
HEMATOLOGY COMMENTS:: (no result)
HEMOGLOBIN: 10.8
IMMATURE CELLS: (no result)
IMMATURE GRANS (ABS): 0.1
IMMATURE GRANULOCYTES: 1
LIPASE: 25
LYMPHS (ABSOLUTE): 0.7
LYMPHS: 9
MCH: 28.1
MCHC: 32.3
MCV: 87
MONOCYTES(ABSOLUTE): 0.3
MONOCYTES: 3
NEUTROPHILS (ABSOLUTE): 6.9
NEUTROPHILS: 87
NRBC: (no result)
PLATELETS: 221
POTASSIUM: 4.2
PROTEIN, TOTAL: 7.1
RBC: 3.84
RDW: 16.6
SODIUM: 135
WBC: 8.1

## 2021-10-12 ENCOUNTER — LAB OUTSIDE AN ENCOUNTER (OUTPATIENT)
Dept: URBAN - METROPOLITAN AREA CLINIC 86 | Facility: CLINIC | Age: 31
End: 2021-10-12

## 2021-10-13 ENCOUNTER — LAB OUTSIDE AN ENCOUNTER (OUTPATIENT)
Dept: URBAN - METROPOLITAN AREA TELEHEALTH 2 | Facility: TELEHEALTH | Age: 31
End: 2021-10-13

## 2021-10-19 ENCOUNTER — LAB OUTSIDE AN ENCOUNTER (OUTPATIENT)
Dept: URBAN - METROPOLITAN AREA CLINIC 86 | Facility: CLINIC | Age: 31
End: 2021-10-19

## 2021-10-19 ENCOUNTER — ERX REFILL RESPONSE (OUTPATIENT)
Dept: URBAN - METROPOLITAN AREA CLINIC 86 | Facility: CLINIC | Age: 31
End: 2021-10-19

## 2021-10-19 RX ORDER — URSODIOL 300 MG/1
1 CAPSULE CAPSULE ORAL
Qty: 60 | Refills: 1 | OUTPATIENT

## 2021-10-19 RX ORDER — URSODIOL 300 MG/1
1 CAPSULE TWICE A DAY WITH FOOD ORALLY 30 DAY(S) CAPSULE ORAL
Qty: 60 CAPSULE | Refills: 2 | OUTPATIENT

## 2021-10-21 LAB
A/G RATIO: 1.3
ALBUMIN: 3.9
ALKALINE PHOSPHATASE: 75
ALT (SGPT): 24
AST (SGOT): 30
BASO (ABSOLUTE): 0
BASOS: 0
BILIRUBIN, TOTAL: <0.2
BUN/CREATININE RATIO: 17
BUN: 11
CALCIUM: 9.5
CARBON DIOXIDE, TOTAL: 20
CHLORIDE: 103
CREATININE: 0.66
EGFR IF AFRICN AM: 135
EGFR IF NONAFRICN AM: 117
EOS (ABSOLUTE): 0
EOS: 0
GLOBULIN, TOTAL: 2.9
GLUCOSE: 107
HEMATOCRIT: 33.1
HEMATOLOGY COMMENTS:: (no result)
HEMOGLOBIN: 10.6
IMMATURE CELLS: (no result)
IMMATURE GRANS (ABS): 0.1
IMMATURE GRANULOCYTES: 1
LIPASE: 25
LYMPHS (ABSOLUTE): 0.6
LYMPHS: 8
MCH: 27.7
MCHC: 32
MCV: 86
MONOCYTES(ABSOLUTE): 0.4
MONOCYTES: 5
NEUTROPHILS (ABSOLUTE): 6.7
NEUTROPHILS: 86
NRBC: (no result)
PLATELETS: 202
POTASSIUM: 4.1
PROTEIN, TOTAL: 6.8
RBC: 3.83
RDW: 16.3
SODIUM: 136
WBC: 7.9

## 2021-10-26 ENCOUNTER — LAB OUTSIDE AN ENCOUNTER (OUTPATIENT)
Dept: URBAN - METROPOLITAN AREA CLINIC 86 | Facility: CLINIC | Age: 31
End: 2021-10-26

## 2021-10-30 LAB
A/G RATIO: 1.3
ALBUMIN: 3.9
ALKALINE PHOSPHATASE: 76
ALT (SGPT): 20
AST (SGOT): 26
BASO (ABSOLUTE): 0
BASOS: 0
BILIRUBIN, TOTAL: <0.2
BUN/CREATININE RATIO: 16
BUN: 11
CALCIUM: 9.4
CARBON DIOXIDE, TOTAL: 23
CHLORIDE: 104
CREATININE: 0.68
EGFR IF AFRICN AM: 134
EGFR IF NONAFRICN AM: 116
EOS (ABSOLUTE): 0
EOS: 1
GLOBULIN, TOTAL: 2.9
GLUCOSE: 76
HEMATOCRIT: 32.2
HEMATOLOGY COMMENTS:: (no result)
HEMOGLOBIN: 10.5
IMMATURE CELLS: (no result)
IMMATURE GRANS (ABS): 0.1
IMMATURE GRANULOCYTES: 1
LIPASE: 30
LYMPHS (ABSOLUTE): 1.1
LYMPHS: 14
MCH: 28.4
MCHC: 32.6
MCV: 87
MONOCYTES(ABSOLUTE): 0.6
MONOCYTES: 7
NEUTROPHILS (ABSOLUTE): 6.4
NEUTROPHILS: 77
NRBC: (no result)
PLATELETS: 176
POTASSIUM: 3.4
PROTEIN, TOTAL: 6.8
RBC: 3.7
RDW: 16.3
SODIUM: 137
WBC: 8.3

## 2021-11-01 ENCOUNTER — LAB OUTSIDE AN ENCOUNTER (OUTPATIENT)
Dept: URBAN - METROPOLITAN AREA TELEHEALTH 2 | Facility: TELEHEALTH | Age: 31
End: 2021-11-01

## 2021-11-01 ENCOUNTER — ERX REFILL RESPONSE (OUTPATIENT)
Dept: URBAN - METROPOLITAN AREA CLINIC 86 | Facility: CLINIC | Age: 31
End: 2021-11-01

## 2021-11-01 RX ORDER — AZATHIOPRINE 50 1/1
TAKE 1 TABLET(50 MG) BY MOUTH EVERY DAY TABLET ORAL ONCE A DAY
Qty: 30 | Refills: 1 | OUTPATIENT

## 2021-11-01 RX ORDER — AZATHIOPRINE 50 MG/1
TAKE 1 TABLET(50 MG) BY MOUTH EVERY DAY ORALLY ONCE A DAY 90 DAYS TABLET ORAL
Qty: 30 TABLET | Refills: 3 | OUTPATIENT

## 2021-11-03 ENCOUNTER — TELEPHONE ENCOUNTER (OUTPATIENT)
Dept: URBAN - METROPOLITAN AREA CLINIC 92 | Facility: CLINIC | Age: 31
End: 2021-11-03

## 2021-11-03 ENCOUNTER — OFFICE VISIT (OUTPATIENT)
Dept: URBAN - METROPOLITAN AREA TELEHEALTH 2 | Facility: TELEHEALTH | Age: 31
End: 2021-11-03
Payer: COMMERCIAL

## 2021-11-03 VITALS — WEIGHT: 128 LBS | HEIGHT: 62 IN | BODY MASS INDEX: 23.55 KG/M2

## 2021-11-03 DIAGNOSIS — Z79.899 HIGH RISK MEDICATION USE: ICD-10-CM

## 2021-11-03 DIAGNOSIS — R74.8 ABNORMAL LIVER ENZYMES: ICD-10-CM

## 2021-11-03 DIAGNOSIS — R76.9 ABNORMAL IMMUNOLOGICAL FINDING IN SERUM: ICD-10-CM

## 2021-11-03 DIAGNOSIS — K75.4 AUTOIMMUNE HEPATITIS: ICD-10-CM

## 2021-11-03 PROCEDURE — 99214 OFFICE O/P EST MOD 30 MIN: CPT

## 2021-11-03 RX ORDER — URSODIOL 300 MG/1
1 CAPSULE TWICE A DAY WITH FOOD ORALLY 30 DAY(S) CAPSULE ORAL
Qty: 60 CAPSULE | Refills: 2 | Status: ACTIVE | COMMUNITY

## 2021-11-03 RX ORDER — AZATHIOPRINE 50 1/1
TAKE 1 TABLET(50 MG) BY MOUTH EVERY DAY TABLET ORAL ONCE A DAY
Qty: 30 | Refills: 1 | OUTPATIENT

## 2021-11-03 RX ORDER — URSODIOL 300 MG/1
1 CAPSULE CAPSULE ORAL
Qty: 60 | Refills: 1 | OUTPATIENT

## 2021-11-03 RX ORDER — FERROUS SULFATE 325(65) MG
1 TABLET TABLET ORAL ONCE A DAY
Status: ACTIVE | COMMUNITY

## 2021-11-03 RX ORDER — PREDNISONE 5 MG/1
1 TABLET TABLET ORAL BID
Qty: 60 TABLET | Refills: 2 | Status: ACTIVE | COMMUNITY

## 2021-11-03 RX ORDER — VALACYCLOVIR 500 MG/1
1 TABLET TABLET, FILM COATED ORAL BID PRN
Status: ACTIVE | COMMUNITY

## 2021-11-03 RX ORDER — AZATHIOPRINE 50 1/1
TAKE 1 TABLET(50 MG) BY MOUTH EVERY DAY TABLET ORAL ONCE A DAY
Qty: 30 | Refills: 1 | Status: ACTIVE | COMMUNITY

## 2021-11-03 NOTE — HPI-TODAY'S VISIT:
Pt is a 31 year old female who presents today for follow up of AIH, currently pregnant-14 weeks.    started iron pills last week due to low h/h.   Pt found out she is pregnant on 9/13/21.  Recent labs 9/20/21- hgb low 10.4, plt 204, cr 0.59. potassium low 3.4. ast 29, alt 27. lipase 22. Pt saw dr. Wakefield and has restarted imuran 2 days after she stopped. prednisone 10mg 8/10/21 and radha bid for now. lfts improving. goal is to be down to 5mg dose of prednisone before delivery. seeing OB today.   RECAP:  she is on enough now to control and imuran slow acting so likely helping some as off for one week or less. needs to see ob and follow course and best to have her come in and discuss this in person and hopefully can get into ob team soon. could try to inc radha a little to see if can get by with lower steroids and radha for little longer but hesistant to change steroids much until plan clear. if flares a risk for her.  started pred 10mg around 8/10 and will continue on this dose for now.   August 30 labs show normal lipase of 28 previously 31.  Glucose still slightly up at 106 previously 109 BUN of 11 creatinine 0.68 sodium 136 potassium 4.4 calcium 9.5 albumin 4.6 bilirubin 0.4 alkaline phosphatase 90 AST 35 ALT 25.  Prior AST 28 and ALT 17 so slightly higher.  Prior alkaline phosphatase 88.   White blood cell count 8.4 hemoglobin trending better at 10.9 but still low but better than prior10.8 and prior 10.3.  MCV normal 86 platelet count 302 up from 233.  Neutrophils normal at 7 lymphocytes 0.8. Per notes this is on the lower dose of the prednisone 10 mg a day. Given the flux seen I would repeat the labs again in 2 weeks.  May take you a little bit longer to settle down at this dose.   july 2021 u/s: ultrasound shows the partially visualized pancreas to be unremarkable.  Liver has a coarsened echotexture to its parenchyma and with mild surface nodularity.  No focal mass seen. Gallbladder was thin-walled and the common bile duct was normal at 2 mm.  Right kidney measured 8.5 cm with no hydronephrosis.  Spleen normal 10 cm.  No ascites seen.  Liver vessels were patent. They overall felt that your liver parenchyma appeared to have cirrhotic features and with no focal mass.  August 10 labs show white cell count 5.3 hemoglobin 10.8 up from 10.3 previously but still a little low.  Normals from 11.1 up to 15.9.  Please share with primary provider.  Platelet count 233.  MCV normal at 87.  Neutrophils normal at 4.4 glucose 109 slightly up previously was 83.  Maybe you were not fasting?  BUN of 8 creatinine 0.78 sodium 138 potassium 4.5 calcium 9.4 albumin 4.6 bilirubin normal at 0.3.  Globulin normal at 3.4.  AST 28 ALT 17 he was seen AST 34 and ALT 23.  Alkaline phosphatase 88 down from 110 and previously 140.  Lipase normal at 31. Prednisone 15mg po qd, aza 50mg po qd and ursodiol 300mg po bid. Confirmed today with pt.

## 2021-11-10 ENCOUNTER — LAB OUTSIDE AN ENCOUNTER (OUTPATIENT)
Dept: URBAN - METROPOLITAN AREA TELEHEALTH 2 | Facility: TELEHEALTH | Age: 31
End: 2021-11-10

## 2021-11-17 ENCOUNTER — LAB OUTSIDE AN ENCOUNTER (OUTPATIENT)
Dept: URBAN - METROPOLITAN AREA TELEHEALTH 2 | Facility: TELEHEALTH | Age: 31
End: 2021-11-17

## 2021-11-17 ENCOUNTER — ERX REFILL RESPONSE (OUTPATIENT)
Dept: URBAN - METROPOLITAN AREA CLINIC 86 | Facility: CLINIC | Age: 31
End: 2021-11-17

## 2021-11-17 ENCOUNTER — OFFICE VISIT (OUTPATIENT)
Dept: URBAN - METROPOLITAN AREA TELEHEALTH 2 | Facility: TELEHEALTH | Age: 31
End: 2021-11-17

## 2021-11-17 RX ORDER — URSODIOL 300 MG/1
1 CAPSULE TWICE A DAY WITH FOOD ORALLY 30 DAY(S) CAPSULE ORAL
Qty: 60 CAPSULE | Refills: 4 | OUTPATIENT

## 2021-11-17 RX ORDER — URSODIOL 300 MG/1
1 CAPSULE CAPSULE ORAL
Qty: 60 | Refills: 1 | OUTPATIENT

## 2021-11-20 LAB
A/G RATIO: 1.3
ALBUMIN: 3.9
ALKALINE PHOSPHATASE: 72
ALT (SGPT): 18
AST (SGOT): 28
BASO (ABSOLUTE): 0
BASOS: 1
BILIRUBIN, TOTAL: 0.2
BUN/CREATININE RATIO: 11
BUN: 8
CALCIUM: 9.4
CARBON DIOXIDE, TOTAL: 21
CHLORIDE: 102
CREATININE: 0.7
EGFR IF AFRICN AM: 133
EGFR IF NONAFRICN AM: 115
EOS (ABSOLUTE): 0
EOS: 0
GLOBULIN, TOTAL: 3
GLUCOSE: 86
HEMATOCRIT: 34.2
HEMATOLOGY COMMENTS:: (no result)
HEMOGLOBIN: 11.1
IMMATURE CELLS: (no result)
IMMATURE GRANS (ABS): 0.1
IMMATURE GRANULOCYTES: 2
LIPASE: 28
LYMPHS (ABSOLUTE): 0.8
LYMPHS: 12
MCH: 28.7
MCHC: 32.5
MCV: 88
MONOCYTES(ABSOLUTE): 0.5
MONOCYTES: 8
NEUTROPHILS (ABSOLUTE): 5
NEUTROPHILS: 77
NRBC: (no result)
PLATELETS: 173
POTASSIUM: 3.9
PROTEIN, TOTAL: 6.9
RBC: 3.87
RDW: 16.8
SODIUM: 135
WBC: 6.4

## 2021-11-24 ENCOUNTER — LAB OUTSIDE AN ENCOUNTER (OUTPATIENT)
Dept: URBAN - METROPOLITAN AREA TELEHEALTH 2 | Facility: TELEHEALTH | Age: 31
End: 2021-11-24

## 2021-11-30 ENCOUNTER — ERX REFILL RESPONSE (OUTPATIENT)
Dept: URBAN - METROPOLITAN AREA CLINIC 86 | Facility: CLINIC | Age: 31
End: 2021-11-30

## 2021-11-30 RX ORDER — AZATHIOPRINE 50 1/1
TAKE 1 TABLET(50 MG) BY MOUTH EVERY DAY TABLET ORAL ONCE A DAY
Qty: 30 | Refills: 1 | OUTPATIENT

## 2021-11-30 RX ORDER — AZATHIOPRINE 50 MG/1
TAKE 1 TABLET BY MOUTH EVERY DAY TABLET ORAL
Qty: 90 TABLET | Refills: 1 | OUTPATIENT

## 2021-12-01 ENCOUNTER — LAB OUTSIDE AN ENCOUNTER (OUTPATIENT)
Dept: URBAN - METROPOLITAN AREA TELEHEALTH 2 | Facility: TELEHEALTH | Age: 31
End: 2021-12-01

## 2021-12-01 ENCOUNTER — OFFICE VISIT (OUTPATIENT)
Dept: URBAN - METROPOLITAN AREA TELEHEALTH 2 | Facility: TELEHEALTH | Age: 31
End: 2021-12-01
Payer: COMMERCIAL

## 2021-12-01 VITALS — WEIGHT: 130 LBS | HEIGHT: 62 IN | BODY MASS INDEX: 23.92 KG/M2

## 2021-12-01 DIAGNOSIS — R74.8 ABNORMAL LIVER ENZYMES: ICD-10-CM

## 2021-12-01 DIAGNOSIS — R76.9 ABNORMAL IMMUNOLOGICAL FINDING IN SERUM: ICD-10-CM

## 2021-12-01 DIAGNOSIS — K75.4 AUTOIMMUNE HEPATITIS: ICD-10-CM

## 2021-12-01 DIAGNOSIS — Z79.899 HIGH RISK MEDICATION USE: ICD-10-CM

## 2021-12-01 PROCEDURE — 99214 OFFICE O/P EST MOD 30 MIN: CPT

## 2021-12-01 RX ORDER — AZATHIOPRINE 50 1/1
TAKE 1 TABLET(50 MG) BY MOUTH EVERY DAY TABLET ORAL ONCE A DAY
Qty: 30 | Refills: 1 | OUTPATIENT

## 2021-12-01 RX ORDER — AZATHIOPRINE 50 1/1
TAKE 1 TABLET(50 MG) BY MOUTH EVERY DAY TABLET ORAL ONCE A DAY
Qty: 30 | Refills: 1 | Status: ACTIVE | COMMUNITY

## 2021-12-01 RX ORDER — VALACYCLOVIR 500 MG/1
1 TABLET TABLET, FILM COATED ORAL BID PRN
Status: ACTIVE | COMMUNITY

## 2021-12-01 RX ORDER — FERROUS SULFATE 325(65) MG
1 TABLET TABLET ORAL ONCE A DAY
Status: ACTIVE | COMMUNITY

## 2021-12-01 RX ORDER — URSODIOL 300 MG/1
1 CAPSULE TWICE A DAY WITH FOOD ORALLY 30 DAY(S) CAPSULE ORAL THREE TIMES A DAY
Refills: 4 | Status: ACTIVE | COMMUNITY

## 2021-12-01 RX ORDER — PREDNISONE 5 MG/1
1 TABLET TABLET ORAL BID
Qty: 60 TABLET | Refills: 2 | Status: ACTIVE | COMMUNITY

## 2021-12-01 RX ORDER — URSODIOL 300 MG/1
1 CAPSULE CAPSULE ORAL
Qty: 60 | Refills: 1 | OUTPATIENT

## 2021-12-01 NOTE — HPI-TODAY'S VISIT:
Pt is a 31 year old female who presents today for follow up of AIH, currently pregnant-18 weeks.    started iron pills last week due to low h/h.     Pt found out she is pregnant on 9/13/21.   She is now on 7.5mg daily since 11/1. will discuss with dr. balderas should we wait to drop her down to 5mg for at last 2 months? week 18 of pregnancy now and ast 28, alt 18.  did anatomy scan and having girl.    RECAP: Pt saw dr. Wakefield and has restarted imuran 2 days after she stopped. prednisone 10mg 8/10/21 and radha bid for now. lfts improving. goal is to be down to 5mg dose of prednisone before delivery.    she is on enough now to control and imuran slow acting so likely helping some as off for one week or less. needs to see ob and follow course and best to have her come in and discuss this in person and hopefully can get into ob team soon. could try to inc radha a little to see if can get by with lower steroids and radha for little longer but hesistant to change steroids much until plan clear. if flares a risk for her.  started pred 10mg around 8/10 and will continue on this dose for now.   August 30 labs show normal lipase of 28 previously 31.  Glucose still slightly up at 106 previously 109 BUN of 11 creatinine 0.68 sodium 136 potassium 4.4 calcium 9.5 albumin 4.6 bilirubin 0.4 alkaline phosphatase 90 AST 35 ALT 25.  Prior AST 28 and ALT 17 so slightly higher.  Prior alkaline phosphatase 88.   White blood cell count 8.4 hemoglobin trending better at 10.9 but still low but better than prior10.8 and prior 10.3.  MCV normal 86 platelet count 302 up from 233.  Neutrophils normal at 7 lymphocytes 0.8. Per notes this is on the lower dose of the prednisone 10 mg a day. Given the flux seen I would repeat the labs again in 2 weeks.  May take you a little bit longer to settle down at this dose.   july 2021 u/s: ultrasound shows the partially visualized pancreas to be unremarkable.  Liver has a coarsened echotexture to its parenchyma and with mild surface nodularity.  No focal mass seen. Gallbladder was thin-walled and the common bile duct was normal at 2 mm.  Right kidney measured 8.5 cm with no hydronephrosis.  Spleen normal 10 cm.  No ascites seen.  Liver vessels were patent. They overall felt that your liver parenchyma appeared to have cirrhotic features and with no focal mass.  August 10 labs show white cell count 5.3 hemoglobin 10.8 up from 10.3 previously but still a little low.  Normals from 11.1 up to 15.9.  Please share with primary provider.  Platelet count 233.  MCV normal at 87.  Neutrophils normal at 4.4 glucose 109 slightly up previously was 83.  Maybe you were not fasting?  BUN of 8 creatinine 0.78 sodium 138 potassium 4.5 calcium 9.4 albumin 4.6 bilirubin normal at 0.3.  Globulin normal at 3.4.  AST 28 ALT 17 he was seen AST 34 and ALT 23.  Alkaline phosphatase 88 down from 110 and previously 140.  Lipase normal at 31. Prednisone 15mg po qd, aza 50mg po qd and ursodiol 300mg po bid. Confirmed today with pt.

## 2021-12-07 ENCOUNTER — ERX REFILL RESPONSE (OUTPATIENT)
Dept: URBAN - METROPOLITAN AREA TELEHEALTH 2 | Facility: TELEHEALTH | Age: 31
End: 2021-12-07

## 2021-12-07 RX ORDER — PREDNISONE 5 MG/1
1 TABLET TABLET ORAL ONCE A DAY
Qty: 30 | Refills: 2 | OUTPATIENT

## 2021-12-07 RX ORDER — PREDNISONE 5 MG/1
1 TABLET TABLET ORAL BID
Qty: 60 TABLET | Refills: 2 | OUTPATIENT

## 2021-12-15 ENCOUNTER — LAB OUTSIDE AN ENCOUNTER (OUTPATIENT)
Dept: URBAN - METROPOLITAN AREA TELEHEALTH 2 | Facility: TELEHEALTH | Age: 31
End: 2021-12-15

## 2021-12-15 LAB
A/G RATIO: 1.3
ALBUMIN: 3.6
ALKALINE PHOSPHATASE: 66
ALT (SGPT): 13
AST (SGOT): 24
BASO (ABSOLUTE): 0
BASOS: 0
BILIRUBIN, TOTAL: <0.2
BUN/CREATININE RATIO: 11
BUN: 7
CALCIUM: 9.1
CARBON DIOXIDE, TOTAL: 18
CHLORIDE: 105
CREATININE: 0.62
EGFR IF AFRICN AM: 138
EGFR IF NONAFRICN AM: 120
EOS (ABSOLUTE): 0
EOS: 1
GLOBULIN, TOTAL: 2.7
GLUCOSE: 93
HEMATOCRIT: 31.4
HEMATOLOGY COMMENTS:: (no result)
HEMOGLOBIN: 10.4
IMMATURE CELLS: (no result)
IMMATURE GRANS (ABS): 0.1
IMMATURE GRANULOCYTES: 1
LIPASE: 32
LYMPHS (ABSOLUTE): 0.9
LYMPHS: 13
MCH: 29.4
MCHC: 33.1
MCV: 89
MONOCYTES(ABSOLUTE): 0.6
MONOCYTES: 9
NEUTROPHILS (ABSOLUTE): 4.9
NEUTROPHILS: 76
NRBC: (no result)
PLATELETS: 161
POTASSIUM: 3.8
PROTEIN, TOTAL: 6.3
RBC: 3.54
RDW: 15.5
SODIUM: 137
WBC: 6.5

## 2021-12-28 ENCOUNTER — WEB ENCOUNTER (OUTPATIENT)
Dept: URBAN - METROPOLITAN AREA CLINIC 86 | Facility: CLINIC | Age: 31
End: 2021-12-28

## 2021-12-29 ENCOUNTER — LAB OUTSIDE AN ENCOUNTER (OUTPATIENT)
Dept: URBAN - METROPOLITAN AREA TELEHEALTH 2 | Facility: TELEHEALTH | Age: 31
End: 2021-12-29

## 2022-01-08 LAB
A/G RATIO: 1.2
ALBUMIN: 3.3
ALKALINE PHOSPHATASE: 62
ALT (SGPT): 10
AST (SGOT): 18
BASO (ABSOLUTE): 0
BASOS: 0
BILIRUBIN, TOTAL: 0.3
BUN/CREATININE RATIO: 11
BUN: 8
CALCIUM: 8.4
CARBON DIOXIDE, TOTAL: 21
CHLORIDE: 106
CREATININE: 0.72
EGFR IF AFRICN AM: 128
EGFR IF NONAFRICN AM: 111
EOS (ABSOLUTE): 0
EOS: 1
GLOBULIN, TOTAL: 2.7
GLUCOSE: 128
HEMATOCRIT: 31.7
HEMATOLOGY COMMENTS:: (no result)
HEMOGLOBIN: 10.5
IMMATURE CELLS: (no result)
IMMATURE GRANS (ABS): 0
IMMATURE GRANULOCYTES: 1
LIPASE: 24
LYMPHS (ABSOLUTE): 1.6
LYMPHS: 28
MCH: 29.6
MCHC: 33.1
MCV: 89
MONOCYTES(ABSOLUTE): 0.4
MONOCYTES: 8
NEUTROPHILS (ABSOLUTE): 3.5
NEUTROPHILS: 62
NRBC: (no result)
PLATELETS: 181
POTASSIUM: 3.3
PROTEIN, TOTAL: 6
RBC: 3.55
RDW: 14.5
SODIUM: 139
WBC: 5.6

## 2022-01-09 ENCOUNTER — TELEPHONE ENCOUNTER (OUTPATIENT)
Dept: URBAN - METROPOLITAN AREA CLINIC 92 | Facility: CLINIC | Age: 32
End: 2022-01-09

## 2022-01-12 ENCOUNTER — OFFICE VISIT (OUTPATIENT)
Dept: URBAN - METROPOLITAN AREA TELEHEALTH 2 | Facility: TELEHEALTH | Age: 32
End: 2022-01-12
Payer: COMMERCIAL

## 2022-01-12 DIAGNOSIS — R74.8 ABNORMAL LIVER ENZYMES: ICD-10-CM

## 2022-01-12 DIAGNOSIS — Z98.890 HISTORY OF LIVER BIOPSY: ICD-10-CM

## 2022-01-12 DIAGNOSIS — Z71.89 VACCINE COUNSELING: ICD-10-CM

## 2022-01-12 DIAGNOSIS — Z79.899 HIGH RISK MEDICATION USE: ICD-10-CM

## 2022-01-12 DIAGNOSIS — R76.9 ABNORMAL IMMUNOLOGICAL FINDING IN SERUM: ICD-10-CM

## 2022-01-12 DIAGNOSIS — K75.4 AUTOIMMUNE HEPATITIS: ICD-10-CM

## 2022-01-12 PROCEDURE — 99214 OFFICE O/P EST MOD 30 MIN: CPT

## 2022-01-12 RX ORDER — URSODIOL 300 MG/1
1 CAPSULE CAPSULE ORAL
Qty: 60 | Refills: 1 | OUTPATIENT

## 2022-01-12 RX ORDER — VALACYCLOVIR 500 MG/1
1 TABLET TABLET, FILM COATED ORAL BID PRN
Status: ACTIVE | COMMUNITY

## 2022-01-12 RX ORDER — URSODIOL 300 MG/1
1 CAPSULE CAPSULE ORAL
Qty: 60 | Refills: 1 | Status: ACTIVE | COMMUNITY

## 2022-01-12 RX ORDER — FERROUS SULFATE 325(65) MG
1 TABLET TABLET ORAL ONCE A DAY
Status: ACTIVE | COMMUNITY

## 2022-01-12 RX ORDER — PREDNISONE 5 MG/1
1 TABLET TABLET ORAL ONCE A DAY
Qty: 30 | Refills: 2 | Status: ACTIVE | COMMUNITY

## 2022-01-12 RX ORDER — AZATHIOPRINE 50 1/1
TAKE 1 TABLET(50 MG) BY MOUTH EVERY DAY TABLET ORAL ONCE A DAY
Qty: 30 | Refills: 1 | OUTPATIENT

## 2022-01-12 RX ORDER — URSODIOL 300 MG/1
1 CAPSULE TWICE A DAY WITH FOOD ORALLY 30 DAY(S) CAPSULE ORAL THREE TIMES A DAY
Refills: 4 | Status: ON HOLD | COMMUNITY

## 2022-01-12 RX ORDER — AZATHIOPRINE 50 1/1
TAKE 1 TABLET(50 MG) BY MOUTH EVERY DAY TABLET ORAL ONCE A DAY
Qty: 30 | Refills: 1 | Status: ACTIVE | COMMUNITY

## 2022-01-12 NOTE — HPI-TODAY'S VISIT:
Pt is a 31 year old female who presents today for follow up of AIH, currently pregnant-18 weeks.    started iron pills last week due to low h/h.       Pt found out she is pregnant on 9/13/21.   She is now on 7.5mg daily since 11/1. will discuss with dr. balderas should we wait to drop her down to 5mg for at last 2 months? week 24 of pregnancy now.   RECAP: Pt saw dr. Wakefield and has restarted imuran 2 days after she stopped. prednisone 10mg 8/10/21 and radha bid for now. lfts improving. goal is to be down to 5mg dose of prednisone before delivery.    she is on enough now to control and imuran slow acting so likely helping some as off for one week or less. needs to see ob and follow course and best to have her come in and discuss this in person and hopefully can get into ob team soon. could try to inc radha a little to see if can get by with lower steroids and ardha for little longer but hesistant to change steroids much until plan clear. if flares a risk for her.  started pred 10mg around 8/10/21  August 30 labs show normal lipase of 28 previously 31.  Glucose still slightly up at 106 previously 109 BUN of 11 creatinine 0.68 sodium 136 potassium 4.4 calcium 9.5 albumin 4.6 bilirubin 0.4 alkaline phosphatase 90 AST 35 ALT 25.  Prior AST 28 and ALT 17 so slightly higher.  Prior alkaline phosphatase 88.   White blood cell count 8.4 hemoglobin trending better at 10.9 but still low but better than prior10.8 and prior 10.3.  MCV normal 86 platelet count 302 up from 233.  Neutrophils normal at 7 lymphocytes 0.8.   july 2021 u/s: ultrasound shows the partially visualized pancreas to be unremarkable.  Liver has a coarsened echotexture to its parenchyma and with mild surface nodularity.  No focal mass seen. Gallbladder was thin-walled and the common bile duct was normal at 2 mm.  Right kidney measured 8.5 cm with no hydronephrosis.  Spleen normal 10 cm.  No ascites seen.  Liver vessels were patent. They overall felt that your liver parenchyma appeared to have cirrhotic features and with no focal mass.

## 2022-01-13 ENCOUNTER — TELEPHONE ENCOUNTER (OUTPATIENT)
Dept: URBAN - METROPOLITAN AREA CLINIC 92 | Facility: CLINIC | Age: 32
End: 2022-01-13

## 2022-01-13 RX ORDER — VALACYCLOVIR 500 MG/1
1 TABLET TABLET, FILM COATED ORAL BID PRN
Status: ACTIVE | COMMUNITY

## 2022-01-13 RX ORDER — URSODIOL 300 MG/1
1 CAPSULE CAPSULE ORAL
Qty: 60 | Refills: 1 | Status: ACTIVE | COMMUNITY

## 2022-01-13 RX ORDER — URSODIOL 300 MG/1
1 CAPSULE TWICE A DAY WITH FOOD ORALLY 30 DAY(S) CAPSULE ORAL THREE TIMES A DAY
Refills: 4 | Status: ON HOLD | COMMUNITY

## 2022-01-13 RX ORDER — PREDNISONE 5 MG/1
1 TABLET TABLET ORAL ONCE A DAY
Qty: 30 | Refills: 2 | Status: ACTIVE | COMMUNITY

## 2022-01-13 RX ORDER — PREDNISONE 1 MG/1
1 TABLET TABLET ORAL
Qty: 120 | Refills: 0 | OUTPATIENT

## 2022-01-13 RX ORDER — AZATHIOPRINE 50 1/1
TAKE 1 TABLET(50 MG) BY MOUTH EVERY DAY TABLET ORAL ONCE A DAY
Qty: 30 | Refills: 1 | Status: ACTIVE | COMMUNITY

## 2022-01-13 RX ORDER — FERROUS SULFATE 325(65) MG
1 TABLET TABLET ORAL ONCE A DAY
Status: ACTIVE | COMMUNITY

## 2022-01-20 ENCOUNTER — LAB OUTSIDE AN ENCOUNTER (OUTPATIENT)
Dept: URBAN - METROPOLITAN AREA CLINIC 92 | Facility: CLINIC | Age: 32
End: 2022-01-20

## 2022-01-22 LAB
A/G RATIO: 1.2
ALBUMIN: 3.4
ALKALINE PHOSPHATASE: 66
ALT (SGPT): 8
AST (SGOT): 20
BASO (ABSOLUTE): 0
BASOS: 0
BILIRUBIN, TOTAL: <0.2
BUN/CREATININE RATIO: 16
BUN: 9
CALCIUM: 8.8
CARBON DIOXIDE, TOTAL: 21
CHLORIDE: 104
CREATININE: 0.57
EGFR IF AFRICN AM: 142
EGFR IF NONAFRICN AM: 123
EOS (ABSOLUTE): 0
EOS: 0
GLOBULIN, TOTAL: 2.8
GLUCOSE: 87
HEMATOCRIT: 31.2
HEMATOLOGY COMMENTS:: (no result)
HEMOGLOBIN: 10.5
IMMATURE CELLS: (no result)
IMMATURE GRANS (ABS): 0.1
IMMATURE GRANULOCYTES: 1
LIPASE: 34
LYMPHS (ABSOLUTE): 0.7
LYMPHS: 12
MCH: 30
MCHC: 33.7
MCV: 89
MONOCYTES(ABSOLUTE): 0.5
MONOCYTES: 9
NEUTROPHILS (ABSOLUTE): 4.6
NEUTROPHILS: 78
NRBC: (no result)
PLATELETS: 160
POTASSIUM: 3.8
PROTEIN, TOTAL: 6.2
RBC: 3.5
RDW: 14.4
SODIUM: 136
WBC: 6

## 2022-01-27 ENCOUNTER — LAB OUTSIDE AN ENCOUNTER (OUTPATIENT)
Dept: URBAN - METROPOLITAN AREA CLINIC 92 | Facility: CLINIC | Age: 32
End: 2022-01-27

## 2022-01-29 LAB
A/G RATIO: 1.4
ALBUMIN: 3.7
ALKALINE PHOSPHATASE: 76
ALT (SGPT): 9
AST (SGOT): 20
BILIRUBIN, TOTAL: <0.2
BUN/CREATININE RATIO: 13
BUN: 9
CALCIUM: 9
CARBON DIOXIDE, TOTAL: 22
CHLORIDE: 105
CREATININE: 0.67
EGFR IF AFRICN AM: 134
EGFR IF NONAFRICN AM: 116
GLOBULIN, TOTAL: 2.7
GLUCOSE: 119
LIPASE: 30
POTASSIUM: 3.6
PROTEIN, TOTAL: 6.4
SODIUM: 139

## 2022-01-30 ENCOUNTER — ERX REFILL RESPONSE (OUTPATIENT)
Dept: URBAN - METROPOLITAN AREA TELEHEALTH 2 | Facility: TELEHEALTH | Age: 32
End: 2022-01-30

## 2022-01-30 RX ORDER — PREDNISONE 1 MG/1
TAKE ONE TABLET QID FOR TOTAL OF (4MG) DAILY TABLET ORAL ONCE A DAY
Qty: 120 | Refills: 1 | OUTPATIENT

## 2022-01-30 RX ORDER — PREDNISONE 1 MG/1
1 TABLET TABLET ORAL
Qty: 120 | Refills: 0 | OUTPATIENT

## 2022-02-03 ENCOUNTER — LAB OUTSIDE AN ENCOUNTER (OUTPATIENT)
Dept: URBAN - METROPOLITAN AREA CLINIC 92 | Facility: CLINIC | Age: 32
End: 2022-02-03

## 2022-02-10 ENCOUNTER — LAB OUTSIDE AN ENCOUNTER (OUTPATIENT)
Dept: URBAN - METROPOLITAN AREA CLINIC 92 | Facility: CLINIC | Age: 32
End: 2022-02-10

## 2022-02-22 ENCOUNTER — LAB OUTSIDE AN ENCOUNTER (OUTPATIENT)
Dept: URBAN - METROPOLITAN AREA CLINIC 86 | Facility: CLINIC | Age: 32
End: 2022-02-22

## 2022-02-23 ENCOUNTER — LAB OUTSIDE AN ENCOUNTER (OUTPATIENT)
Dept: URBAN - METROPOLITAN AREA TELEHEALTH 2 | Facility: TELEHEALTH | Age: 32
End: 2022-02-23

## 2022-02-23 LAB
A/G RATIO: 1.5
ALBUMIN: 3.8
ALKALINE PHOSPHATASE: 92
ALT (SGPT): 12
AST (SGOT): 23
BASO (ABSOLUTE): 0
BASOS: 0
BILIRUBIN, TOTAL: <0.2
BUN/CREATININE RATIO: 14
BUN: 9
CALCIUM: 8.8
CARBON DIOXIDE, TOTAL: 19
CHLORIDE: 103
CREATININE: 0.64
EGFR IF AFRICN AM: 136
EGFR IF NONAFRICN AM: 118
EOS (ABSOLUTE): 0
EOS: 0
GLOBULIN, TOTAL: 2.6
GLUCOSE: 114
HEMATOCRIT: 31.8
HEMATOLOGY COMMENTS:: (no result)
HEMOGLOBIN: 10.4
IMMATURE CELLS: (no result)
IMMATURE GRANS (ABS): 0.1
IMMATURE GRANULOCYTES: 2
LIPASE: 33
LYMPHS (ABSOLUTE): 0.8
LYMPHS: 11
MCH: 29.1
MCHC: 32.7
MCV: 89
MONOCYTES(ABSOLUTE): 0.5
MONOCYTES: 8
NEUTROPHILS (ABSOLUTE): 5.4
NEUTROPHILS: 79
NRBC: (no result)
PLATELETS: 168
POTASSIUM: 4.1
PROTEIN, TOTAL: 6.4
RBC: 3.57
RDW: 13.6
SODIUM: 137
WBC: 6.9

## 2022-02-24 ENCOUNTER — OFFICE VISIT (OUTPATIENT)
Dept: URBAN - METROPOLITAN AREA TELEHEALTH 2 | Facility: TELEHEALTH | Age: 32
End: 2022-02-24
Payer: COMMERCIAL

## 2022-02-24 VITALS — HEIGHT: 62 IN | BODY MASS INDEX: 25.21 KG/M2 | WEIGHT: 137 LBS

## 2022-02-24 DIAGNOSIS — Z71.89 VACCINE COUNSELING: ICD-10-CM

## 2022-02-24 DIAGNOSIS — R74.8 ABNORMAL LIVER ENZYMES: ICD-10-CM

## 2022-02-24 DIAGNOSIS — Z98.890 HISTORY OF LIVER BIOPSY: ICD-10-CM

## 2022-02-24 DIAGNOSIS — K75.4 AUTOIMMUNE HEPATITIS: ICD-10-CM

## 2022-02-24 DIAGNOSIS — Z79.899 HIGH RISK MEDICATION USE: ICD-10-CM

## 2022-02-24 DIAGNOSIS — R76.9 ABNORMAL IMMUNOLOGICAL FINDING IN SERUM: ICD-10-CM

## 2022-02-24 PROCEDURE — 99214 OFFICE O/P EST MOD 30 MIN: CPT | Performed by: PHYSICIAN ASSISTANT

## 2022-02-24 RX ORDER — PREDNISONE 1 MG/1
TAKE ONE TABLET QID FOR TOTAL OF (4MG) DAILY TABLET ORAL ONCE A DAY
Qty: 120 | Refills: 1 | Status: ACTIVE | COMMUNITY

## 2022-02-24 RX ORDER — PREDNISONE 5 MG/1
1 TABLET TABLET ORAL ONCE A DAY
Qty: 30 | Refills: 2 | Status: ACTIVE | COMMUNITY

## 2022-02-24 RX ORDER — FERROUS SULFATE 325(65) MG
1 TABLET TABLET ORAL ONCE A DAY
Status: ON HOLD | COMMUNITY

## 2022-02-24 RX ORDER — VALACYCLOVIR 500 MG/1
1 TABLET TABLET, FILM COATED ORAL BID PRN
Status: ACTIVE | COMMUNITY

## 2022-02-24 RX ORDER — AZATHIOPRINE 50 1/1
TAKE 1 TABLET(50 MG) BY MOUTH EVERY DAY TABLET ORAL ONCE A DAY
Qty: 30 | Refills: 1 | Status: ACTIVE | COMMUNITY

## 2022-02-24 RX ORDER — URSODIOL 300 MG/1
1 CAPSULE TWICE A DAY WITH FOOD ORALLY 30 DAY(S) CAPSULE ORAL THREE TIMES A DAY
Refills: 4 | Status: ON HOLD | COMMUNITY

## 2022-02-24 RX ORDER — AZATHIOPRINE 50 1/1
TAKE 1 TABLET(50 MG) BY MOUTH EVERY DAY TABLET ORAL ONCE A DAY
Qty: 30 | Refills: 1 | OUTPATIENT

## 2022-02-24 RX ORDER — URSODIOL 300 MG/1
1 CAPSULE CAPSULE ORAL
Qty: 60 | Refills: 1 | OUTPATIENT

## 2022-02-24 RX ORDER — URSODIOL 300 MG/1
1 CAPSULE CAPSULE ORAL
Qty: 60 | Refills: 1 | Status: ACTIVE | COMMUNITY

## 2022-02-24 NOTE — PHYSICAL EXAM CONSTITUTIONAL:
pleasant, well nourished, well developed, in no acute distress , normal communication ability
detailed exam

## 2022-02-24 NOTE — HPI-TODAY'S VISIT:
Pt is a 32 year old female who presents today for follow up of AIH.    started iron pills last week due to low h/h.         Pt found out she is pregnant on 9/13/21.   She is now on 4mg daily since 1/13/22. week 29-30 of pregnancy now. will drop down to 3mg dose and check labs weekly.   RECAP: Pt saw dr. Wakefield and has restarted imuran 2 days after she stopped. prednisone 10mg 8/10/21 and radha bid for now. lfts improving. goal is to be down to 5mg dose of prednisone before delivery.    she is on enough now to control and imuran slow acting so likely helping some as off for one week or less. needs to see ob and follow course and best to have her come in and discuss this in person and hopefully can get into ob team soon. could try to inc radha a little to see if can get by with lower steroids and radha for little longer but hesistant to change steroids much until plan clear. if flares a risk for her.  started pred 10mg around 8/10/21  August 30 labs show normal lipase of 28 previously 31.  Glucose still slightly up at 106 previously 109 BUN of 11 creatinine 0.68 sodium 136 potassium 4.4 calcium 9.5 albumin 4.6 bilirubin 0.4 alkaline phosphatase 90 AST 35 ALT 25.  Prior AST 28 and ALT 17 so slightly higher.  Prior alkaline phosphatase 88.   White blood cell count 8.4 hemoglobin trending better at 10.9 but still low but better than prior10.8 and prior 10.3.  MCV normal 86 platelet count 302 up from 233.  Neutrophils normal at 7 lymphocytes 0.8.   july 2021 u/s: ultrasound shows the partially visualized pancreas to be unremarkable.  Liver has a coarsened echotexture to its parenchyma and with mild surface nodularity.  No focal mass seen. Gallbladder was thin-walled and the common bile duct was normal at 2 mm.  Right kidney measured 8.5 cm with no hydronephrosis.  Spleen normal 10 cm.  No ascites seen.  Liver vessels were patent. They overall felt that your liver parenchyma appeared to have cirrhotic features and with no focal mass.

## 2022-03-02 ENCOUNTER — ERX REFILL RESPONSE (OUTPATIENT)
Dept: URBAN - METROPOLITAN AREA CLINIC 86 | Facility: CLINIC | Age: 32
End: 2022-03-02

## 2022-03-02 RX ORDER — URSODIOL 300 MG/1
1 CAPSULE TWICE A DAY WITH FOOD ORALLY 30 DAY(S) CAPSULE ORAL
Qty: 60 CAPSULE | Refills: 4 | OUTPATIENT

## 2022-03-02 RX ORDER — URSODIOL 300 MG/1
1 CAPSULE TWICE A DAY WITH FOOD ORALLY 30 DAY(S) CAPSULE ORAL
Qty: 180 CAPSULE | Refills: 2 | OUTPATIENT

## 2022-03-03 ENCOUNTER — LAB OUTSIDE AN ENCOUNTER (OUTPATIENT)
Dept: URBAN - METROPOLITAN AREA TELEHEALTH 2 | Facility: TELEHEALTH | Age: 32
End: 2022-03-03

## 2022-03-08 ENCOUNTER — ERX REFILL RESPONSE (OUTPATIENT)
Dept: URBAN - METROPOLITAN AREA TELEHEALTH 2 | Facility: TELEHEALTH | Age: 32
End: 2022-03-08

## 2022-03-08 RX ORDER — PREDNISONE 1 MG/1
3 TABLET (TOTAL OF 3MG DAILY) TABLET ORAL ONCE A DAY
Qty: 90 TABLET | Refills: 1 | OUTPATIENT

## 2022-03-08 RX ORDER — PREDNISONE 1 MG/1
TAKE ONE TABLET QID FOR TOTAL OF (4MG) DAILY TABLET ORAL ONCE A DAY
Qty: 120 | Refills: 1 | OUTPATIENT

## 2022-03-10 ENCOUNTER — LAB OUTSIDE AN ENCOUNTER (OUTPATIENT)
Dept: URBAN - METROPOLITAN AREA TELEHEALTH 2 | Facility: TELEHEALTH | Age: 32
End: 2022-03-10

## 2022-03-17 ENCOUNTER — LAB OUTSIDE AN ENCOUNTER (OUTPATIENT)
Dept: URBAN - METROPOLITAN AREA TELEHEALTH 2 | Facility: TELEHEALTH | Age: 32
End: 2022-03-17

## 2022-03-18 LAB
A/G RATIO: 1.3
ALBUMIN: 3.5
ALKALINE PHOSPHATASE: 116
ALT (SGPT): 10
AST (SGOT): 21
BASO (ABSOLUTE): 0
BASOS: 0
BILIRUBIN, TOTAL: <0.2
BUN/CREATININE RATIO: 11
BUN: 7
CALCIUM: 9.1
CARBON DIOXIDE, TOTAL: 20
CHLORIDE: 104
CREATININE: 0.63
EGFR: 120
EOS (ABSOLUTE): 0
EOS: 1
GLOBULIN, TOTAL: 2.7
GLUCOSE: 94
HEMATOCRIT: 30.9
HEMATOLOGY COMMENTS:: (no result)
HEMOGLOBIN: 10.1
IMMATURE CELLS: (no result)
IMMATURE GRANS (ABS): 0.1
IMMATURE GRANULOCYTES: 1
LIPASE: 59
LYMPHS (ABSOLUTE): 1
LYMPHS: 14
MCH: 29
MCHC: 32.7
MCV: 89
MONOCYTES(ABSOLUTE): 0.6
MONOCYTES: 9
NEUTROPHILS (ABSOLUTE): 5.1
NEUTROPHILS: 75
NRBC: (no result)
PLATELETS: 172
POTASSIUM: 4
PROTEIN, TOTAL: 6.2
RBC: 3.48
RDW: 13.5
SODIUM: 137
WBC: 6.8

## 2022-03-22 ENCOUNTER — TELEPHONE ENCOUNTER (OUTPATIENT)
Dept: URBAN - METROPOLITAN AREA CLINIC 92 | Facility: CLINIC | Age: 32
End: 2022-03-22

## 2022-03-29 ENCOUNTER — OFFICE VISIT (OUTPATIENT)
Dept: URBAN - METROPOLITAN AREA CLINIC 86 | Facility: CLINIC | Age: 32
End: 2022-03-29
Payer: COMMERCIAL

## 2022-03-29 ENCOUNTER — WEB ENCOUNTER (OUTPATIENT)
Dept: URBAN - METROPOLITAN AREA CLINIC 86 | Facility: CLINIC | Age: 32
End: 2022-03-29

## 2022-03-29 VITALS
TEMPERATURE: 96.6 F | DIASTOLIC BLOOD PRESSURE: 76 MMHG | HEART RATE: 113 BPM | WEIGHT: 145 LBS | BODY MASS INDEX: 26.68 KG/M2 | HEIGHT: 62 IN | SYSTOLIC BLOOD PRESSURE: 124 MMHG

## 2022-03-29 DIAGNOSIS — R74.8 ABNORMAL LIVER ENZYMES: ICD-10-CM

## 2022-03-29 DIAGNOSIS — Z98.890 HISTORY OF LIVER BIOPSY: ICD-10-CM

## 2022-03-29 DIAGNOSIS — Z79.899 HIGH RISK MEDICATION USE: ICD-10-CM

## 2022-03-29 DIAGNOSIS — Z71.89 VACCINE COUNSELING: ICD-10-CM

## 2022-03-29 DIAGNOSIS — R76.9 ABNORMAL IMMUNOLOGICAL FINDING IN SERUM: ICD-10-CM

## 2022-03-29 DIAGNOSIS — K75.4 AUTOIMMUNE HEPATITIS: ICD-10-CM

## 2022-03-29 PROCEDURE — 99214 OFFICE O/P EST MOD 30 MIN: CPT | Performed by: PHYSICIAN ASSISTANT

## 2022-03-29 RX ORDER — AZATHIOPRINE 50 1/1
TAKE 1 TABLET(50 MG) BY MOUTH EVERY DAY TABLET ORAL ONCE A DAY
Qty: 30 | Refills: 1 | OUTPATIENT

## 2022-03-29 RX ORDER — PREDNISONE 1 MG/1
3 TABLET (TOTAL OF 3MG DAILY) TABLET ORAL ONCE A DAY
Qty: 90 TABLET | Refills: 1 | Status: ACTIVE | COMMUNITY

## 2022-03-29 RX ORDER — VALACYCLOVIR 500 MG/1
1 TABLET TABLET, FILM COATED ORAL BID PRN
Status: ACTIVE | COMMUNITY

## 2022-03-29 RX ORDER — PREDNISONE 5 MG/1
1 TABLET TABLET ORAL ONCE A DAY
Qty: 30 | Refills: 2 | Status: ACTIVE | COMMUNITY

## 2022-03-29 RX ORDER — URSODIOL 300 MG/1
1 CAPSULE CAPSULE ORAL
Qty: 60 | Refills: 1 | OUTPATIENT

## 2022-03-29 RX ORDER — URSODIOL 300 MG/1
1 CAPSULE TWICE A DAY WITH FOOD ORALLY 30 DAY(S) CAPSULE ORAL
Qty: 180 CAPSULE | Refills: 2 | Status: ACTIVE | COMMUNITY

## 2022-03-29 RX ORDER — AZATHIOPRINE 50 1/1
TAKE 1 TABLET(50 MG) BY MOUTH EVERY DAY TABLET ORAL ONCE A DAY
Qty: 30 | Refills: 1 | Status: ACTIVE | COMMUNITY

## 2022-03-29 RX ORDER — FERROUS SULFATE 325(65) MG
1 TABLET TABLET ORAL ONCE A DAY
Status: ON HOLD | COMMUNITY

## 2022-03-29 NOTE — HPI-TODAY'S VISIT:
Pt is a 32 year old female who presents today for follow up of AIH.    started iron pills last week due to low h/h.         Pt found out she is pregnant on 9/13/21.    pt walked from her work, recommend she follow up in regards to elevated HR, per pt feels well. seeing ob this thursday. She is now on 4mg daily since 1/13/22. due next month. now on 2mg of pred daily. will do labs today, if lfts stable for her, will drop down to 1mg daily.   RECAP: Pt saw dr. Wakefield and has restarted imuran 2 days after she stopped. prednisone 10mg 8/10/21 and radha bid for now. lfts improving. goal is to be down to 5mg dose of prednisone before delivery.    she is on enough now to control and imuran slow acting so likely helping some as off for one week or less. needs to see ob and follow course and best to have her come in and discuss this in person and hopefully can get into ob team soon. could try to inc radha a little to see if can get by with lower steroids and radha for little longer but hesistant to change steroids much until plan clear. if flares a risk for her.  started pred 10mg around 8/10/21  August 30 labs show normal lipase of 28 previously 31.  Glucose still slightly up at 106 previously 109 BUN of 11 creatinine 0.68 sodium 136 potassium 4.4 calcium 9.5 albumin 4.6 bilirubin 0.4 alkaline phosphatase 90 AST 35 ALT 25.  Prior AST 28 and ALT 17 so slightly higher.  Prior alkaline phosphatase 88.   White blood cell count 8.4 hemoglobin trending better at 10.9 but still low but better than prior10.8 and prior 10.3.  MCV normal 86 platelet count 302 up from 233.  Neutrophils normal at 7 lymphocytes 0.8.   july 2021 u/s: ultrasound shows the partially visualized pancreas to be unremarkable.  Liver has a coarsened echotexture to its parenchyma and with mild surface nodularity.  No focal mass seen. Gallbladder was thin-walled and the common bile duct was normal at 2 mm.  Right kidney measured 8.5 cm with no hydronephrosis.  Spleen normal 10 cm.  No ascites seen.  Liver vessels were patent. They overall felt that your liver parenchyma appeared to have cirrhotic features and with no focal mass. Patient

## 2022-03-29 NOTE — EXAM-PHYSICAL EXAM
General: pleasant, well developed, well nourished, no acute distress, non ill appearing Eyes- no scleral icterus  HENT: normocephalic, atraumatic head, face mask covering mouth and nose Neck: supple, no JVD Chest: normal breath sounds, normal work of breathing Heart: regular rate and rhythm without murmur Abdomen: soft, non tender, pregnancy noted Musculoskeletal: normal gait and station Extremities: no edema, no asterixis, no palmar erythema Skin: no rashes, no jaundice Neurologic: no focal deficits, alert and oriented x 3 Psychiatric: stable mood, appropriate affect  General: pleasant, well developed, well nourished, no acute distress, non ill appearing Eyes- no scleral icterus HENT: normocephalic, atraumatic head, face mask covering mouth and nose Neck: supple, no JVD Chest: normal breath sounds, normal work of breathing Heart: regular rate and rhythm without murmur Abdomen: soft, non tender, non distended, bowel sounds present, no hepatomegaly, no splenomegaly, no ascites Musculoskeletal: normal gait and station Extremities: no edema, no asterixis, no palmar erythema Skin: no rashes, no jaundice Neurologic: no focal deficits, alert and oriented x 3 Psychiatric: stable mood, appropriate affect

## 2022-03-30 ENCOUNTER — TELEPHONE ENCOUNTER (OUTPATIENT)
Dept: URBAN - METROPOLITAN AREA CLINIC 92 | Facility: CLINIC | Age: 32
End: 2022-03-30

## 2022-03-30 LAB
A/G RATIO: 2
ALBUMIN: 4.5
ALKALINE PHOSPHATASE: 59
ALT (SGPT): 31
AST (SGOT): 26
BASO (ABSOLUTE): 0.1
BASOS: 1
BILIRUBIN, TOTAL: 0.3
BUN/CREATININE RATIO: 12
BUN: 13
CALCIUM: 9.7
CARBON DIOXIDE, TOTAL: 20
CHLORIDE: 103
CREATININE: 1.11
EGFR: 67
EOS (ABSOLUTE): 0.1
EOS: 1
GLOBULIN, TOTAL: 2.3
GLUCOSE: 108
HEMATOCRIT: 30.2
HEMATOLOGY COMMENTS:: (no result)
HEMOGLOBIN: 9.7
IMMATURE CELLS: (no result)
IMMATURE GRANS (ABS): 0.1
IMMATURE GRANULOCYTES: 2
LIPASE: 27
LYMPHS (ABSOLUTE): 1.2
LYMPHS: 19
MCH: 28.7
MCHC: 32.1
MCV: 89
MONOCYTES(ABSOLUTE): 0.7
MONOCYTES: 11
NEUTROPHILS (ABSOLUTE): 4.1
NEUTROPHILS: 66
NRBC: (no result)
PLATELETS: 163
POTASSIUM: 4.4
PROTEIN, TOTAL: 6.8
RBC: 3.38
RDW: 13.5
SODIUM: 140
WBC: 6.1

## 2022-04-05 ENCOUNTER — LAB OUTSIDE AN ENCOUNTER (OUTPATIENT)
Dept: URBAN - METROPOLITAN AREA CLINIC 86 | Facility: CLINIC | Age: 32
End: 2022-04-05

## 2022-04-06 LAB
A/G RATIO: 1.4
ALBUMIN: 3.4
ALKALINE PHOSPHATASE: 132
ALT (SGPT): 12
AST (SGOT): 23
BASO (ABSOLUTE): 0
BASOS: 0
BILIRUBIN, TOTAL: 0.2
BUN/CREATININE RATIO: 7
BUN: 5
CALCIUM: 8.4
CARBON DIOXIDE, TOTAL: 19
CHLORIDE: 106
CREATININE: 0.67
EGFR: 118
EOS (ABSOLUTE): 0.1
EOS: 1
GLOBULIN, TOTAL: 2.5
GLUCOSE: 110
HEMATOCRIT: 32
HEMATOLOGY COMMENTS:: (no result)
HEMOGLOBIN: 10.1
IMMATURE CELLS: (no result)
IMMATURE GRANS (ABS): 0.1
IMMATURE GRANULOCYTES: 2
LIPASE: 37
LYMPHS (ABSOLUTE): 1
LYMPHS: 16
MCH: 28.1
MCHC: 31.6
MCV: 89
MONOCYTES(ABSOLUTE): 0.6
MONOCYTES: 9
NEUTROPHILS (ABSOLUTE): 4.5
NEUTROPHILS: 72
NRBC: 1
PLATELETS: 145
POTASSIUM: 3.6
PROTEIN, TOTAL: 5.9
RBC: 3.59
RDW: 14.8
SODIUM: 137
WBC: 6.3

## 2022-04-07 ENCOUNTER — TELEPHONE ENCOUNTER (OUTPATIENT)
Dept: URBAN - METROPOLITAN AREA CLINIC 92 | Facility: CLINIC | Age: 32
End: 2022-04-07

## 2022-04-07 ENCOUNTER — ERX REFILL RESPONSE (OUTPATIENT)
Dept: URBAN - METROPOLITAN AREA TELEHEALTH 2 | Facility: TELEHEALTH | Age: 32
End: 2022-04-07

## 2022-04-07 RX ORDER — PREDNISONE 1 MG/1
3 TABLET (TOTAL OF 3MG DAILY) TABLET ORAL ONCE A DAY
Qty: 90 TABLET | Refills: 1 | OUTPATIENT

## 2022-04-07 RX ORDER — PREDNISONE 1 MG/1
2 TABLETS WITH FOOD OR MILK (TOTAL OF 2MG DAILY) TABLET ORAL ONCE A DAY
Qty: 60 TABLET | Refills: 3 | OUTPATIENT

## 2022-04-12 ENCOUNTER — LAB OUTSIDE AN ENCOUNTER (OUTPATIENT)
Dept: URBAN - METROPOLITAN AREA CLINIC 86 | Facility: CLINIC | Age: 32
End: 2022-04-12

## 2022-04-12 ENCOUNTER — TELEPHONE ENCOUNTER (OUTPATIENT)
Dept: URBAN - METROPOLITAN AREA CLINIC 92 | Facility: CLINIC | Age: 32
End: 2022-04-12

## 2022-04-12 RX ORDER — AZATHIOPRINE 50 1/1
TAKE 1 TABLET(50 MG) BY MOUTH EVERY DAY ORALLY ONCE A DAY TABLET ORAL
Qty: 90 | Refills: 0

## 2022-04-19 ENCOUNTER — LAB OUTSIDE AN ENCOUNTER (OUTPATIENT)
Dept: URBAN - METROPOLITAN AREA CLINIC 86 | Facility: CLINIC | Age: 32
End: 2022-04-19

## 2022-04-23 LAB
A/G RATIO: 1.2
ALBUMIN: 3.3
ALKALINE PHOSPHATASE: 142
ALT (SGPT): 10
AST (SGOT): 18
BASO (ABSOLUTE): 0
BASOS: 1
BILIRUBIN, TOTAL: 0.3
BUN/CREATININE RATIO: 12
BUN: 8
CALCIUM: 8.9
CARBON DIOXIDE, TOTAL: 20
CHLORIDE: 103
CREATININE: 0.66
EGFR: 119
EOS (ABSOLUTE): 0
EOS: 1
GLOBULIN, TOTAL: 2.8
GLUCOSE: 90
HEMATOCRIT: 31.3
HEMATOLOGY COMMENTS:: (no result)
HEMOGLOBIN: 9.7
IMMATURE CELLS: (no result)
IMMATURE GRANS (ABS): 0.1
IMMATURE GRANULOCYTES: 1
LIPASE: 32
LYMPHS (ABSOLUTE): 1.1
LYMPHS: 19
MCH: 27.5
MCHC: 31
MCV: 89
MONOCYTES(ABSOLUTE): 0.7
MONOCYTES: 11
NEUTROPHILS (ABSOLUTE): 4.1
NEUTROPHILS: 67
NRBC: (no result)
PLATELETS: 178
POTASSIUM: 4.3
PROTEIN, TOTAL: 6.1
RBC: 3.53
RDW: 14.6
SODIUM: 136
WBC: 6

## 2022-04-24 ENCOUNTER — TELEPHONE ENCOUNTER (OUTPATIENT)
Dept: URBAN - METROPOLITAN AREA CLINIC 92 | Facility: CLINIC | Age: 32
End: 2022-04-24

## 2022-04-26 ENCOUNTER — LAB OUTSIDE AN ENCOUNTER (OUTPATIENT)
Dept: URBAN - METROPOLITAN AREA CLINIC 86 | Facility: CLINIC | Age: 32
End: 2022-04-26

## 2022-05-10 ENCOUNTER — LAB OUTSIDE AN ENCOUNTER (OUTPATIENT)
Dept: URBAN - METROPOLITAN AREA CLINIC 86 | Facility: CLINIC | Age: 32
End: 2022-05-10

## 2022-05-12 LAB
A/G RATIO: 1.2
ALBUMIN: 3.7
ALKALINE PHOSPHATASE: 118
ALT (SGPT): 16
AST (SGOT): 23
BASO (ABSOLUTE): 0.1
BASOS: 1
BILIRUBIN, TOTAL: <0.2
BUN/CREATININE RATIO: 20
BUN: 14
CALCIUM: 9.5
CARBON DIOXIDE, TOTAL: 22
CHLORIDE: 107
CREATININE: 0.7
EGFR: 117
EOS (ABSOLUTE): 0.2
EOS: 3
GLOBULIN, TOTAL: 3
GLUCOSE: 87
HEMATOCRIT: 31.4
HEMATOLOGY COMMENTS:: (no result)
HEMOGLOBIN: 9.9
IMMATURE CELLS: (no result)
IMMATURE GRANS (ABS): 0
IMMATURE GRANULOCYTES: 0
LIPASE: 36
LYMPHS (ABSOLUTE): 1.1
LYMPHS: 20
MCH: 27.1
MCHC: 31.5
MCV: 86
MONOCYTES(ABSOLUTE): 0.4
MONOCYTES: 7
NEUTROPHILS (ABSOLUTE): 4
NEUTROPHILS: 69
NRBC: (no result)
PLATELETS: 407
POTASSIUM: 4.2
PROTEIN, TOTAL: 6.7
RBC: 3.65
RDW: 14.9
SODIUM: 142
WBC: 5.8

## 2022-05-23 ENCOUNTER — TELEPHONE ENCOUNTER (OUTPATIENT)
Dept: URBAN - METROPOLITAN AREA CLINIC 92 | Facility: CLINIC | Age: 32
End: 2022-05-23

## 2022-05-25 ENCOUNTER — OFFICE VISIT (OUTPATIENT)
Dept: URBAN - METROPOLITAN AREA TELEHEALTH 2 | Facility: TELEHEALTH | Age: 32
End: 2022-05-25
Payer: COMMERCIAL

## 2022-05-25 VITALS — WEIGHT: 129 LBS | BODY MASS INDEX: 23.74 KG/M2 | HEIGHT: 62 IN

## 2022-05-25 DIAGNOSIS — Z71.89 VACCINE COUNSELING: ICD-10-CM

## 2022-05-25 DIAGNOSIS — R76.9 ABNORMAL IMMUNOLOGICAL FINDING IN SERUM: ICD-10-CM

## 2022-05-25 DIAGNOSIS — Z79.899 HIGH RISK MEDICATION USE: ICD-10-CM

## 2022-05-25 DIAGNOSIS — Z98.890 HISTORY OF LIVER BIOPSY: ICD-10-CM

## 2022-05-25 DIAGNOSIS — K75.4 AUTOIMMUNE HEPATITIS: ICD-10-CM

## 2022-05-25 DIAGNOSIS — R74.8 ABNORMAL LIVER ENZYMES: ICD-10-CM

## 2022-05-25 PROCEDURE — 99214 OFFICE O/P EST MOD 30 MIN: CPT

## 2022-05-25 RX ORDER — OXYCODONE HYDROCHLORIDE 5 MG/1
1 TABLET AS NEEDED TABLET ORAL
Status: ACTIVE | COMMUNITY

## 2022-05-25 RX ORDER — URSODIOL 300 MG/1
1 CAPSULE CAPSULE ORAL
Qty: 60 | Refills: 1 | Status: ACTIVE | COMMUNITY

## 2022-05-25 RX ORDER — AZATHIOPRINE 50 1/1
TAKE 1 TABLET(50 MG) BY MOUTH EVERY DAY TABLET ORAL ONCE A DAY
Qty: 30 | Refills: 1 | OUTPATIENT

## 2022-05-25 RX ORDER — ACETAMINOPHEN 325 MG
1 TABLET AS NEEDED TABLET ORAL
Status: ACTIVE | COMMUNITY

## 2022-05-25 RX ORDER — VALACYCLOVIR 500 MG/1
1 TABLET TABLET, FILM COATED ORAL BID PRN
Status: ACTIVE | COMMUNITY

## 2022-05-25 RX ORDER — URSODIOL 300 MG/1
1 CAPSULE CAPSULE ORAL
Qty: 60 | Refills: 1 | OUTPATIENT

## 2022-05-25 RX ORDER — PREDNISONE 5 MG/1
1 TABLET TABLET ORAL ONCE A DAY
Qty: 30 | Refills: 2 | Status: ACTIVE | COMMUNITY

## 2022-05-25 RX ORDER — IBUPROFEN 600 MG/1
1 TABLET WITH FOOD OR MILK AS NEEDED TABLET ORAL THREE TIMES A DAY
Status: ACTIVE | COMMUNITY

## 2022-05-25 RX ORDER — URSODIOL 300 MG/1
1 CAPSULE TWICE A DAY WITH FOOD ORALLY 30 DAY(S) CAPSULE ORAL
Qty: 180 CAPSULE | Refills: 2 | Status: ACTIVE | COMMUNITY

## 2022-05-25 RX ORDER — PREDNISONE 1 MG/1
2 TABLETS WITH FOOD OR MILK (TOTAL OF 2MG DAILY) TABLET ORAL ONCE A DAY
Qty: 60 TABLET | Refills: 3 | Status: ACTIVE | COMMUNITY

## 2022-05-25 RX ORDER — AZATHIOPRINE 50 1/1
TAKE 1 TABLET(50 MG) BY MOUTH EVERY DAY ORALLY ONCE A DAY TABLET ORAL
Qty: 90 | Refills: 0 | Status: ACTIVE | COMMUNITY

## 2022-05-25 RX ORDER — FERROUS SULFATE 325(65) MG
1 TABLET TABLET ORAL ONCE A DAY
Status: ON HOLD | COMMUNITY

## 2022-05-25 NOTE — HPI-TODAY'S VISIT:
Pt is a 32 year old female who presents today for follow up of AIH.    started iron pills last week due to low h/h.         Did TH with pt. due to itching will reduce dose of radha to bid dosing. max dose around 900mg. continue pred 2mg for now has been on this since 2022.  pt had baby girl on 22,    RECAP: Pt saw dr. Wakefield and has restarted imuran 2 days after she stopped. prednisone 10mg 8/10/21 and radha bid for now. lfts improving. goal is to be down to 5mg dose of prednisone before delivery.    she is on enough now to control and imuran slow acting so likely helping some as off for one week or less. needs to see ob and follow course and best to have her come in and discuss this in person and hopefully can get into ob team soon. could try to inc radha a little to see if can get by with lower steroids and radha for little longer but hesistant to change steroids much until plan clear. if flares a risk for her.  started pred 10mg around 8/10/21  August 30 labs show normal lipase of 28 previously 31.  Glucose still slightly up at 106 previously 109 BUN of 11 creatinine 0.68 sodium 136 potassium 4.4 calcium 9.5 albumin 4.6 bilirubin 0.4 alkaline phosphatase 90 AST 35 ALT 25.  Prior AST 28 and ALT 17 so slightly higher.  Prior alkaline phosphatase 88.   White blood cell count 8.4 hemoglobin trending better at 10.9 but still low but better than prior10.8 and prior 10.3.  MCV normal 86 platelet count 302 up from 233.  Neutrophils normal at 7 lymphocytes 0.8.   2021 u/s: ultrasound shows the partially visualized pancreas to be unremarkable.  Liver has a coarsened echotexture to its parenchyma and with mild surface nodularity.  No focal mass seen. Gallbladder was thin-walled and the common bile duct was normal at 2 mm.  Right kidney measured 8.5 cm with no hydronephrosis.  Spleen normal 10 cm.  No ascites seen.  Liver vessels were patent. They overall felt that your liver parenchyma appeared to have cirrhotic features and with no focal mass.

## 2022-05-29 ENCOUNTER — LAB OUTSIDE AN ENCOUNTER (OUTPATIENT)
Dept: URBAN - METROPOLITAN AREA CLINIC 86 | Facility: CLINIC | Age: 32
End: 2022-05-29

## 2022-06-08 ENCOUNTER — LAB OUTSIDE AN ENCOUNTER (OUTPATIENT)
Dept: URBAN - METROPOLITAN AREA TELEHEALTH 2 | Facility: TELEHEALTH | Age: 32
End: 2022-06-08

## 2022-06-16 ENCOUNTER — TELEPHONE ENCOUNTER (OUTPATIENT)
Dept: URBAN - METROPOLITAN AREA CLINIC 92 | Facility: CLINIC | Age: 32
End: 2022-06-16

## 2022-06-16 LAB
A/G RATIO: 1.3
ALBUMIN: 4
ALKALINE PHOSPHATASE: 174
ALT (SGPT): 31
AST (SGOT): 34
BASO (ABSOLUTE): 0
BASOS: 1
BILIRUBIN, TOTAL: <0.2
BUN/CREATININE RATIO: 16
BUN: 11
CALCIUM: 9.5
CARBON DIOXIDE, TOTAL: 24
CHLORIDE: 105
CREATININE: 0.68
EGFR: 118
EOS (ABSOLUTE): 0.1
EOS: 3
GLOBULIN, TOTAL: 3.1
GLUCOSE: 80
HEMATOCRIT: 35.6
HEMATOLOGY COMMENTS:: (no result)
HEMOGLOBIN: 10.6
IMMATURE CELLS: (no result)
IMMATURE GRANS (ABS): 0
IMMATURE GRANULOCYTES: 0
LIPASE: 34
LYMPHS (ABSOLUTE): 1.2
LYMPHS: 30
MCH: 26
MCHC: 29.8
MCV: 87
MONOCYTES(ABSOLUTE): 0.5
MONOCYTES: 13
NEUTROPHILS (ABSOLUTE): 2.1
NEUTROPHILS: 53
NRBC: (no result)
PLATELETS: 301
POTASSIUM: 4.3
PROTEIN, TOTAL: 7.1
RBC: 4.08
RDW: 15.5
SODIUM: 143
WBC: 3.9

## 2022-06-16 NOTE — HPI-TODAY'S VISIT:
Dear Chelly Pak, June 14 labs show white blood cell count 4.1 hemoglobin 12.1 platelet count 219 MCV 89 neutrophils 1.6 and lymphocytes 1.9.  These are all in normal range. Glucose 166 still up and down from 361 last time.  BUN of 10 creatinine 0.73 which is now much better than previous 1.45 cr of April.  Please share with primary provider. Sodium 142 potassium 4.0 albumin 4.2.  Total bilirubin 0.4 alkaline phosphatase 68 and AST normal at 33 down from 55.  ALT 28 normal down from 49.  Ideal ALT is less than 25. Look forward to seeing you soon. Dr. Isbell

## 2022-06-22 ENCOUNTER — LAB OUTSIDE AN ENCOUNTER (OUTPATIENT)
Dept: URBAN - METROPOLITAN AREA TELEHEALTH 2 | Facility: TELEHEALTH | Age: 32
End: 2022-06-22

## 2022-06-27 ENCOUNTER — LAB OUTSIDE AN ENCOUNTER (OUTPATIENT)
Dept: URBAN - METROPOLITAN AREA CLINIC 92 | Facility: CLINIC | Age: 32
End: 2022-06-27

## 2022-06-30 ENCOUNTER — OFFICE VISIT (OUTPATIENT)
Dept: URBAN - METROPOLITAN AREA CLINIC 16 | Facility: CLINIC | Age: 32
End: 2022-06-30
Payer: COMMERCIAL

## 2022-06-30 DIAGNOSIS — R93.2 ABNORMAL ULTRASOUND OF LIVER: ICD-10-CM

## 2022-06-30 DIAGNOSIS — K75.4 AUTOIMMUNE HEPATITIS: ICD-10-CM

## 2022-06-30 PROCEDURE — 93975 VASCULAR STUDY: CPT

## 2022-06-30 PROCEDURE — 76705 ECHO EXAM OF ABDOMEN: CPT

## 2022-06-30 RX ORDER — VALACYCLOVIR 500 MG/1
1 TABLET TABLET, FILM COATED ORAL BID PRN
Status: ACTIVE | COMMUNITY

## 2022-06-30 RX ORDER — AZATHIOPRINE 50 1/1
TAKE 1 TABLET(50 MG) BY MOUTH EVERY DAY TABLET ORAL ONCE A DAY
Qty: 30 | Refills: 1 | Status: ACTIVE | COMMUNITY

## 2022-06-30 RX ORDER — OXYCODONE HYDROCHLORIDE 5 MG/1
1 TABLET AS NEEDED TABLET ORAL
Status: ACTIVE | COMMUNITY

## 2022-06-30 RX ORDER — PREDNISONE 5 MG/1
1 TABLET TABLET ORAL ONCE A DAY
Qty: 30 | Refills: 2 | Status: ACTIVE | COMMUNITY

## 2022-06-30 RX ORDER — URSODIOL 300 MG/1
1 CAPSULE CAPSULE ORAL
Qty: 60 | Refills: 1 | Status: ACTIVE | COMMUNITY

## 2022-06-30 RX ORDER — ACETAMINOPHEN 325 MG
1 TABLET AS NEEDED TABLET ORAL
Status: ACTIVE | COMMUNITY

## 2022-06-30 RX ORDER — URSODIOL 300 MG/1
1 CAPSULE TWICE A DAY WITH FOOD ORALLY 30 DAY(S) CAPSULE ORAL
Qty: 180 CAPSULE | Refills: 2 | Status: ACTIVE | COMMUNITY

## 2022-06-30 RX ORDER — FERROUS SULFATE 325(65) MG
1 TABLET TABLET ORAL ONCE A DAY
Status: ON HOLD | COMMUNITY

## 2022-06-30 RX ORDER — PREDNISONE 1 MG/1
2 TABLETS WITH FOOD OR MILK (TOTAL OF 2MG DAILY) TABLET ORAL ONCE A DAY
Qty: 60 TABLET | Refills: 3 | Status: ACTIVE | COMMUNITY

## 2022-06-30 RX ORDER — IBUPROFEN 600 MG/1
1 TABLET WITH FOOD OR MILK AS NEEDED TABLET ORAL THREE TIMES A DAY
Status: ACTIVE | COMMUNITY

## 2022-07-03 ENCOUNTER — TELEPHONE ENCOUNTER (OUTPATIENT)
Dept: URBAN - METROPOLITAN AREA CLINIC 92 | Facility: CLINIC | Age: 32
End: 2022-07-03

## 2022-07-03 NOTE — HPI-TODAY'S VISIT:
Beverley Combs, June 30 ultrasound shows partially visualized pancreas unremarkable. The liver parenchyma however it did appear to be coarsened but with no mass. Gallbladder thin-walled and no stones. Common bile duct normal at 2 mm.  Right kidney 8.9 cm with no hydronephrosis.  Spleen normal at 9.6 cm. Liver vessels patent. They mention that the coarsening could represent early chronic liver disease.  On your last year ultrasound the liver had some coarsening and even mild surface nodularity seen, so even though it does not appear at first glance to be better, the description that they used is milder now. 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 Dr Isbell

## 2022-07-09 ENCOUNTER — TELEPHONE ENCOUNTER (OUTPATIENT)
Dept: URBAN - METROPOLITAN AREA CLINIC 92 | Facility: CLINIC | Age: 32
End: 2022-07-09

## 2022-07-09 LAB
ALBUMIN: 4.1
ALKALINE PHOSPHATASE: 163
ALT (SGPT): 16
AST (SGOT): 23
BILIRUBIN, DIRECT: <0.1
BILIRUBIN, TOTAL: 0.3
PROTEIN, TOTAL: 7.2

## 2022-07-09 NOTE — HPI-TODAY'S VISIT:
Dear Paty Combs, July 8 labs show alb 4.1 and tb 0.3 and direct bilirubin less than 0.1 and elevated alk phos of 163 and ast 23 and alt 16. Shelly 15 labs alk 174 and ast 34 and alt 31. So seems like liver labs are settling a little lower.  Thank you. Called the pt: ursodiol 300mg po tid and pred 2mg a day. Ursodiol can take 2-3 m to fully kick in. Dr Isbell

## 2022-07-14 ENCOUNTER — WEB ENCOUNTER (OUTPATIENT)
Dept: URBAN - METROPOLITAN AREA CLINIC 92 | Facility: CLINIC | Age: 32
End: 2022-07-14

## 2022-07-14 RX ORDER — PREDNISONE 1 MG/1
2 TABLETS WITH FOOD OR MILK (TOTAL OF 2MG DAILY) TABLET ORAL ONCE A DAY
Qty: 60 TABLET | Refills: 1

## 2022-07-14 RX ORDER — AZATHIOPRINE 50 1/1
TAKE 1 TABLET(50 MG) BY MOUTH EVERY DAY TABLET ORAL ONCE A DAY
Qty: 30 | Refills: 1
End: 2022-09-13

## 2022-07-14 RX ORDER — URSODIOL 300 MG/1
1 CAPSULE CAPSULE ORAL
Qty: 90 | Refills: 1

## 2022-07-19 ENCOUNTER — TELEPHONE ENCOUNTER (OUTPATIENT)
Dept: URBAN - METROPOLITAN AREA CLINIC 86 | Facility: CLINIC | Age: 32
End: 2022-07-19

## 2022-07-19 ENCOUNTER — TELEPHONE ENCOUNTER (OUTPATIENT)
Dept: URBAN - METROPOLITAN AREA CLINIC 92 | Facility: CLINIC | Age: 32
End: 2022-07-19

## 2022-07-19 RX ORDER — AZATHIOPRINE 50 1/1
TAKE 1 TABLET(50 MG) BY MOUTH EVERY DAY ORALLY ONCE A DAY TABLET ORAL
Qty: 90 | Refills: 0

## 2022-07-19 RX ORDER — AZATHIOPRINE 50 1/1
TAKE 1 TABLET(50 MG) BY MOUTH EVERY DAY ORALLY ONCE A DAY TABLET ORAL
Qty: 30 | Refills: 1

## 2022-07-21 ENCOUNTER — ERX REFILL RESPONSE (OUTPATIENT)
Dept: URBAN - METROPOLITAN AREA CLINIC 86 | Facility: CLINIC | Age: 32
End: 2022-07-21

## 2022-07-21 ENCOUNTER — OFFICE VISIT (OUTPATIENT)
Dept: URBAN - METROPOLITAN AREA CLINIC 86 | Facility: CLINIC | Age: 32
End: 2022-07-21
Payer: COMMERCIAL

## 2022-07-21 VITALS
DIASTOLIC BLOOD PRESSURE: 91 MMHG | BODY MASS INDEX: 23.92 KG/M2 | WEIGHT: 130 LBS | SYSTOLIC BLOOD PRESSURE: 137 MMHG | TEMPERATURE: 97.2 F | HEIGHT: 62 IN | HEART RATE: 99 BPM

## 2022-07-21 DIAGNOSIS — Z98.890 HISTORY OF LIVER BIOPSY: ICD-10-CM

## 2022-07-21 DIAGNOSIS — R74.8 ABNORMAL LIVER ENZYMES: ICD-10-CM

## 2022-07-21 DIAGNOSIS — Z79.899 HIGH RISK MEDICATION USE: ICD-10-CM

## 2022-07-21 DIAGNOSIS — R76.9 ABNORMAL IMMUNOLOGICAL FINDING IN SERUM: ICD-10-CM

## 2022-07-21 DIAGNOSIS — K74.02 HEPATIC FIBROSIS, ADVANCED FIBROSIS: ICD-10-CM

## 2022-07-21 DIAGNOSIS — Z71.89 VACCINE COUNSELING: ICD-10-CM

## 2022-07-21 DIAGNOSIS — K75.4 AUTOIMMUNE HEPATITIS: ICD-10-CM

## 2022-07-21 PROCEDURE — 99214 OFFICE O/P EST MOD 30 MIN: CPT

## 2022-07-21 RX ORDER — PREDNISONE 5 MG/1
1 TABLET TABLET ORAL ONCE A DAY
Qty: 30 | Refills: 2 | Status: DISCONTINUED | COMMUNITY

## 2022-07-21 RX ORDER — PREDNISONE 1 MG/1
2 TABLETS WITH FOOD OR MILK (TOTAL OF 2MG DAILY) TABLET ORAL ONCE A DAY
Qty: 60 TABLET | Refills: 1 | Status: ACTIVE | COMMUNITY

## 2022-07-21 RX ORDER — URSODIOL 300 MG/1
1 CAPSULE CAPSULE ORAL
Qty: 90 | Refills: 1 | Status: ACTIVE | COMMUNITY

## 2022-07-21 RX ORDER — ACETAMINOPHEN 325 MG
1 TABLET AS NEEDED TABLET ORAL
Status: DISCONTINUED | COMMUNITY

## 2022-07-21 RX ORDER — URSODIOL 300 MG/1
1 CAPSULE TWICE A DAY WITH FOOD ORALLY 30 DAY(S) CAPSULE ORAL
Qty: 180 CAPSULE | Refills: 2 | Status: DISCONTINUED | COMMUNITY

## 2022-07-21 RX ORDER — FERROUS SULFATE 325(65) MG
1 TABLET TABLET ORAL ONCE A DAY
Status: ON HOLD | COMMUNITY

## 2022-07-21 RX ORDER — AZATHIOPRINE 50 MG/1
TAKE 1 TABLET BY MOUTH DAILY TABLET ORAL
Qty: 90 TABLET | Refills: 1 | OUTPATIENT

## 2022-07-21 RX ORDER — OXYCODONE HYDROCHLORIDE 5 MG/1
1 TABLET AS NEEDED TABLET ORAL
Status: DISCONTINUED | COMMUNITY

## 2022-07-21 RX ORDER — AZATHIOPRINE 50 1/1
TAKE 1 TABLET(50 MG) BY MOUTH EVERY DAY ORALLY ONCE A DAY TABLET ORAL
Qty: 90 | Refills: 0 | OUTPATIENT

## 2022-07-21 RX ORDER — VALACYCLOVIR 500 MG/1
1 TABLET TABLET, FILM COATED ORAL BID PRN
Status: ON HOLD | COMMUNITY

## 2022-07-21 RX ORDER — IBUPROFEN 600 MG/1
1 TABLET WITH FOOD OR MILK AS NEEDED TABLET ORAL THREE TIMES A DAY
Status: DISCONTINUED | COMMUNITY

## 2022-07-21 RX ORDER — AZATHIOPRINE 50 1/1
TAKE 1 TABLET(50 MG) BY MOUTH EVERY DAY ORALLY ONCE A DAY TABLET ORAL
Qty: 90 | Refills: 0 | Status: ACTIVE | COMMUNITY

## 2022-07-21 RX ORDER — URSODIOL 300 MG/1
1 CAPSULE CAPSULE ORAL THREE TIMES A DAY
Qty: 270 CAPSULE | Refills: 1 | OUTPATIENT

## 2022-07-21 NOTE — HPI-TODAY'S VISIT:
Pt is a 32 year old female who presents post may 2022 visit with Ms Juan larson for follow up of AIH post pregnancy.  She  had anemia. Started iron pills last week due to low h/h.         She got the new job.   labs show alb 4.1 and tb 0.3 and direct bilirubin less than 0.1 and elevated alk phos of 163 and ast 23 and alt 16.  Inc ursodiol 600mg to 900mg.  Shelly 15 labs alk 174 and ast 34 and alt 31. So seems like liver labs are settling a little lower.    ultrasound shows partially visualized pancreas unremarkable. The liver parenchyma however it did appear to be coarsened but with no mass. Gallbladder thin-walled and no stones. Common bile duct normal at 2 mm.  Right kidney 8.9 cm with no hydronephrosis.  Spleen normal at 9.6 cm. Liver vessels patent. They mention that the coarsening could represent early chronic liver disease.  On your last year ultrasound the liver had some coarsening and even mild surface nodularity seen, so even though it does not appear at first glance to be better, the description that they used is milder now. 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   labs show white blood cell count 4.1 hemoglobin 12.1 platelet count 219 MCV 89 neutrophils 1.6 and lymphocytes 1.9.  These are all in normal range. Glucose 166 still up and down from 361 last time.  BUN of 10 creatinine 0.73 which is now much better than previous 1.45 cr of April.  Please share with primary provider. Sodium 142 potassium 4.0 albumin 4.2.  Total bilirubin 0.4 alkaline phosphatase 68 and AST normal at 33 down from 55.  ALT 28 normal down from 49.  Ideal ALT is less than 25. Look forward to seeing you soon.  May 2022 Did TH with pt. due to itching will reduce dose of radha to bid dosing. max dose around 900mg. continue pred 2mg for now has been on this since 2022.  pt had baby girl on 22,   Dr. Wakefield worked with her in pregnancy. She stayed on imuran during pregnancy. At start prednisone 10mg 8/10/21 and radha bid and weaned as per ob.     labs show normal lipase of 28 previously 31.  Glucose still slightly up at 106 previously 109 BUN of 11 creatinine 0.68 sodium 136 potassium 4.4 calcium 9.5 albumin 4.6 bilirubin 0.4 alkaline phosphatase 90 AST 35 ALT 25.  Prior AST 28 and ALT 17 so slightly higher.  Prior alkaline phosphatase 88.   White blood cell count 8.4 hemoglobin trending better at 10.9 but still low but better than prior10.8 and prior 10.3.  MCV normal 86 platelet count 302 up from 233.  Neutrophils normal at 7 lymphocytes 0.8.   2021 u/s: ultrasound shows the partially visualized pancreas to be unremarkable.  Liver has a coarsened echotexture to its parenchyma and with mild surface nodularity.  No focal mass seen. Gallbladder was thin-walled and the common bile duct was normal at 2 mm.  Right kidney measured 8.5 cm with no hydronephrosis.  Spleen normal 10 cm.  No ascites seen.  Liver vessels were patent. They overall felt that your liver parenchyma appeared to have cirrhotic features and with no focal mass.  Plan: 1. Stay the course. 2. Redo the u.s in dec. aga. 3. Plan for the labs 6 and 12 weeks and 16 weeks.  Stressed to pt the need for social distancing and strict handwashing and wearing a mask and to follow any other new or added CDC recommendations as this is an evolving target.  Duration of the visit was 30 min with 10 min of chart prep reviewing data and information that was available for the visit and setting up in ecw and then an additional 20 min for the face to face/telemed visit today with time spent reviewing said material and their clinical correlates and then with this information being used to formulate a treatment plan.

## 2022-07-25 ENCOUNTER — LAB OUTSIDE AN ENCOUNTER (OUTPATIENT)
Dept: URBAN - METROPOLITAN AREA TELEHEALTH 2 | Facility: TELEHEALTH | Age: 32
End: 2022-07-25

## 2022-08-29 ENCOUNTER — LAB OUTSIDE AN ENCOUNTER (OUTPATIENT)
Dept: URBAN - METROPOLITAN AREA CLINIC 86 | Facility: CLINIC | Age: 32
End: 2022-08-29

## 2022-09-16 ENCOUNTER — TELEPHONE ENCOUNTER (OUTPATIENT)
Dept: URBAN - METROPOLITAN AREA CLINIC 92 | Facility: CLINIC | Age: 32
End: 2022-09-16

## 2022-09-16 LAB
A/G RATIO: 1.2
ALBUMIN: 4.4
ALKALINE PHOSPHATASE: 190
ALT (SGPT): 27
AST (SGOT): 35
BASO (ABSOLUTE): 0
BASOS: 1
BILIRUBIN, TOTAL: 0.4
BUN/CREATININE RATIO: 14
BUN: 11
CALCIUM: 9.6
CARBON DIOXIDE, TOTAL: 22
CHLORIDE: 103
CREATININE: 0.76
EGFR: 106
EOS (ABSOLUTE): 0
EOS: 1
GLOBULIN, TOTAL: 3.6
GLUCOSE: 73
HEMATOCRIT: 40
HEMATOLOGY COMMENTS:: (no result)
HEMOGLOBIN: 12.5
IMMATURE CELLS: (no result)
IMMATURE GRANS (ABS): 0
IMMATURE GRANULOCYTES: 0
LIPASE: 21
LYMPHS (ABSOLUTE): 0.9
LYMPHS: 22
MCH: 27.5
MCHC: 31.3
MCV: 88
MONOCYTES(ABSOLUTE): 0.4
MONOCYTES: 9
NEUTROPHILS (ABSOLUTE): 2.8
NEUTROPHILS: 67
NRBC: (no result)
PLATELETS: 225
POTASSIUM: 4.4
PROTEIN, TOTAL: 8
RBC: 4.54
RDW: 16
SODIUM: 142
WBC: 4.1

## 2022-09-16 NOTE — HPI-TODAY'S VISIT:
Dear Julio Cesartimmy Garret, Sept 15: glu 73 and bun 11 and cr 0.76 and na 142 and k 4.4 and cl 103 and co2 22 and ca 9.6 and total protein 8.0 and alb 4.4 and tb 0.4 and alk 190 (up from 174 and prior 142.) Ast 35 and alt 27. So these are higher.  July limited hepatitis alk 163 and ast 23 and alt 16 they were lower.  Wbc 4.1 and hg 12.5 and hct 40.0 and mcv 88 and platelets 225.  MCHC or mean corpuscular hemoglobin concentration 31.3 little low but up from 29.8 prior. Please share with local provider. Neutrophils 2.8 and lymphs 0.9.  Per notes, on aza 50mg po qd and prednisone 2mg poi qd and ursodiol 300mg po tid (increased in July). Called pt and asked if she did  use any fenugreek like medicine (crackers took) a few weeks ago for 2 weeks month ago. [employer] Lactation Cheddar crisps: have turmeric in them. We need redo the labs and do alk phs fraction to check to see if liver or bone. Spoke with pt and she will not take more herbals without letting us know. Need to see the trend and if dropping and from liver then maybe the turmeric turn. Turmeric is an immune stimulant and may take time to wear off. Dr Isbell

## 2022-09-18 ENCOUNTER — ERX REFILL RESPONSE (OUTPATIENT)
Dept: URBAN - METROPOLITAN AREA CLINIC 86 | Facility: CLINIC | Age: 32
End: 2022-09-18

## 2022-09-18 RX ORDER — PREDNISONE 1 MG/1
2 TABLETS WITH FOOD OR MILK (TOTAL OF 2MG DAILY) ORALLY ONCE A DAY 30 DAY(S) TABLET ORAL
Qty: 60 TABLET | Refills: 2 | OUTPATIENT

## 2022-09-18 RX ORDER — PREDNISONE 1 MG/1
2 TABLETS WITH FOOD OR MILK (TOTAL OF 2MG DAILY) TABLET ORAL ONCE A DAY
Qty: 60 TABLET | Refills: 1 | OUTPATIENT

## 2022-09-19 ENCOUNTER — TELEPHONE ENCOUNTER (OUTPATIENT)
Dept: URBAN - METROPOLITAN AREA CLINIC 86 | Facility: CLINIC | Age: 32
End: 2022-09-19

## 2022-09-19 ENCOUNTER — LAB OUTSIDE AN ENCOUNTER (OUTPATIENT)
Dept: URBAN - METROPOLITAN AREA CLINIC 92 | Facility: CLINIC | Age: 32
End: 2022-09-19

## 2022-09-24 ENCOUNTER — TELEPHONE ENCOUNTER (OUTPATIENT)
Dept: URBAN - METROPOLITAN AREA CLINIC 92 | Facility: CLINIC | Age: 32
End: 2022-09-24

## 2022-09-24 LAB
ALBUMIN: 4.4
ALKALINE PHOSPHATASE: 165
ALT (SGPT): 20
AST (SGOT): 29
BILIRUBIN, DIRECT: 0.16
BILIRUBIN, TOTAL: 0.4
BONE FRACTION:: 23
INTESTINAL FRAC.:: 3
LIVER FRACTION:: 74
PROTEIN, TOTAL: 7.6

## 2022-09-24 NOTE — HPI-TODAY'S VISIT:
Beverley Combs, September 19 labs showed liver fraction 74%, bone fraction 23% and intestinal fraction 3% so this confirms that the alkaline phosphatase is mainly coming from liver which is the normal pattern. Albumin 4.4 bilirubin 0.4, direct bilirubin 0.6 alkaline phosphatase 165 up from 163 back in July 8 for comparison.  AST 29 ALT 20 and previous AST 23 and 16 back on July 8. Sept 2022: alk 190 and ast 35 and alt 27 so drifting back to your usual and we will follow. Dr Isbell

## 2022-09-27 ENCOUNTER — TELEPHONE ENCOUNTER (OUTPATIENT)
Dept: URBAN - METROPOLITAN AREA CLINIC 92 | Facility: CLINIC | Age: 32
End: 2022-09-27

## 2022-09-27 LAB
A/G RATIO: 1.4
ALBUMIN: 4.3
ALKALINE PHOSPHATASE: 147
ALT (SGPT): 19
AST (SGOT): 26
BASO (ABSOLUTE): 0
BASOS: 0
BILIRUBIN, TOTAL: 0.3
BUN/CREATININE RATIO: 13
BUN: 8
CALCIUM: 9.3
CARBON DIOXIDE, TOTAL: 23
CHLORIDE: 103
CREATININE: 0.6
EGFR: 121
EOS (ABSOLUTE): 0
EOS: 0
GLOBULIN, TOTAL: 3.1
GLUCOSE: 94
HEMATOCRIT: 35.8
HEMATOLOGY COMMENTS:: (no result)
HEMOGLOBIN: 11.7
IMMATURE CELLS: (no result)
IMMATURE GRANS (ABS): 0
IMMATURE GRANULOCYTES: 0
LIPASE: 22
LYMPHS (ABSOLUTE): 0.8
LYMPHS: 15
MCH: 28.7
MCHC: 32.7
MCV: 88
MONOCYTES(ABSOLUTE): 0.4
MONOCYTES: 7
NEUTROPHILS (ABSOLUTE): 4.2
NEUTROPHILS: 78
NRBC: (no result)
PLATELETS: 206
POTASSIUM: 3.9
PROTEIN, TOTAL: 7.4
RBC: 4.07
RDW: 15.3
SODIUM: 139
WBC: 5.5

## 2022-09-27 NOTE — HPI-TODAY'S VISIT:
Dear Paty Combs, Sept 26: lipase 22 and normal. Glu 94 and bun 8 and cr 0.60 and na 139 and k 3.9 and cl 103 and co2 23 and ca 9.3 and alb 4.3 and tb 0.3 and alk 147 and down from 190 on sept 15. Ast 26 and alt 19. Prior ast 35 and alt 27. Wbc 5.5 and hg 11.7 and hct 35.8 and plat 206. Mcv 88 and neutrophils 4.2 and lymphs 0.8. So doing better and we will follow course. Dr Isbell

## 2022-10-10 ENCOUNTER — LAB OUTSIDE AN ENCOUNTER (OUTPATIENT)
Dept: URBAN - METROPOLITAN AREA CLINIC 86 | Facility: CLINIC | Age: 32
End: 2022-10-10

## 2022-12-01 ENCOUNTER — OFFICE VISIT (OUTPATIENT)
Dept: URBAN - METROPOLITAN AREA CLINIC 16 | Facility: CLINIC | Age: 32
End: 2022-12-01
Payer: COMMERCIAL

## 2022-12-01 DIAGNOSIS — K75.4 AUTOIMMUNE HEPATITIS: ICD-10-CM

## 2022-12-01 PROCEDURE — 93975 VASCULAR STUDY: CPT

## 2022-12-01 PROCEDURE — 76705 ECHO EXAM OF ABDOMEN: CPT

## 2022-12-01 RX ORDER — AZATHIOPRINE 50 MG/1
TAKE 1 TABLET BY MOUTH DAILY TABLET ORAL
Qty: 90 TABLET | Refills: 1 | Status: ACTIVE | COMMUNITY

## 2022-12-01 RX ORDER — PREDNISONE 1 MG/1
2 TABLETS WITH FOOD OR MILK (TOTAL OF 2MG DAILY) ORALLY ONCE A DAY 30 DAY(S) TABLET ORAL
Qty: 60 TABLET | Refills: 2 | Status: ACTIVE | COMMUNITY

## 2022-12-01 RX ORDER — FERROUS SULFATE 325(65) MG
1 TABLET TABLET ORAL ONCE A DAY
Status: ON HOLD | COMMUNITY

## 2022-12-01 RX ORDER — URSODIOL 300 MG/1
1 CAPSULE CAPSULE ORAL THREE TIMES A DAY
Qty: 270 CAPSULE | Refills: 1 | Status: ACTIVE | COMMUNITY

## 2022-12-01 RX ORDER — VALACYCLOVIR 500 MG/1
1 TABLET TABLET, FILM COATED ORAL BID PRN
Status: ON HOLD | COMMUNITY

## 2022-12-03 ENCOUNTER — TELEPHONE ENCOUNTER (OUTPATIENT)
Dept: URBAN - METROPOLITAN AREA CLINIC 92 | Facility: CLINIC | Age: 32
End: 2022-12-03

## 2022-12-03 NOTE — HPI-TODAY'S VISIT:
Beverley Combs, December 1 ultrasound shows liver echogenicity appears increased and with no focal lesions. Common bile duct was normal at 2 mm. Gallbladder appeared normal. No significant ascites was seen. Right kidney was 9.2 cm and no hydronephrosis was seen. Liver vessels were patent. Spleen was normal in size. Overall the liver was echogenic in keeping with possible chronic liver disease but that also could be related to fatty liver. Need to look at your lab patterns. Patent vessels were seen. Long-term we can talk about trying to get an MRI at some point electively to look deeper at this liver issue and sort out fat versus fibrosis better. Dr. Isbell

## 2022-12-09 ENCOUNTER — LAB OUTSIDE AN ENCOUNTER (OUTPATIENT)
Dept: URBAN - METROPOLITAN AREA TELEHEALTH 2 | Facility: TELEHEALTH | Age: 32
End: 2022-12-09

## 2022-12-09 ENCOUNTER — OFFICE VISIT (OUTPATIENT)
Dept: URBAN - METROPOLITAN AREA TELEHEALTH 2 | Facility: TELEHEALTH | Age: 32
End: 2022-12-09
Payer: COMMERCIAL

## 2022-12-09 VITALS — WEIGHT: 130 LBS | HEIGHT: 62 IN | BODY MASS INDEX: 23.92 KG/M2

## 2022-12-09 DIAGNOSIS — K75.4 AUTOIMMUNE HEPATITIS: ICD-10-CM

## 2022-12-09 DIAGNOSIS — K74.02 HEPATIC FIBROSIS, ADVANCED FIBROSIS: ICD-10-CM

## 2022-12-09 PROCEDURE — 99214 OFFICE O/P EST MOD 30 MIN: CPT

## 2022-12-09 RX ORDER — PREDNISONE 1 MG/1
2 TABLETS WITH FOOD OR MILK (TOTAL OF 2MG DAILY) ORALLY ONCE A DAY 30 DAY(S) TABLET ORAL
Qty: 180 | Refills: 1

## 2022-12-09 RX ORDER — PREDNISONE 1 MG/1
2 TABLETS WITH FOOD OR MILK (TOTAL OF 2MG DAILY) ORALLY ONCE A DAY 30 DAY(S) TABLET ORAL
Qty: 60 TABLET | Refills: 2 | Status: ACTIVE | COMMUNITY

## 2022-12-09 RX ORDER — AZATHIOPRINE 50 MG/1
TAKE 1 TABLET BY MOUTH DAILY TABLET ORAL
Qty: 90 TABLET | Refills: 1

## 2022-12-09 RX ORDER — AZATHIOPRINE 50 MG/1
TAKE 1 TABLET BY MOUTH DAILY TABLET ORAL
Qty: 90 TABLET | Refills: 1 | Status: ACTIVE | COMMUNITY

## 2022-12-09 RX ORDER — FERROUS SULFATE 325(65) MG
1 TABLET TABLET ORAL
Status: ACTIVE | COMMUNITY

## 2022-12-09 RX ORDER — VALACYCLOVIR 500 MG/1
1 TABLET TABLET, FILM COATED ORAL BID PRN
Status: ON HOLD | COMMUNITY

## 2022-12-09 RX ORDER — URSODIOL 300 MG/1
1 CAPSULE CAPSULE ORAL THREE TIMES A DAY
Qty: 270 CAPSULE | Refills: 1 | Status: ACTIVE | COMMUNITY

## 2022-12-09 RX ORDER — URSODIOL 300 MG/1
1 CAPSULE CAPSULE ORAL THREE TIMES A DAY
Qty: 270 CAPSULE | Refills: 1 | OUTPATIENT

## 2022-12-09 NOTE — HPI-TODAY'S VISIT:
Pt is a 32 year old female who presents post 2022 visit for follow up of AIH post pregnancy and she delivered 2022.   ultrasound shows liver echogenicity appears increased and with no focal lesions. Common bile duct was normal at 2 mm. Gallbladder appeared normal. No significant ascites was seen. Right kidney was 9.2 cm and no hydronephrosis was seen. Liver vessels were patent. Spleen was normal in size. Overall the liver was echogenic in keeping with possible chronic liver disease but that also could be related to fatty liver. Need to look at your lab patterns. Patent vessels were seen. Long-term we can talk about trying to get an MRI at some point electively to look deeper at this liver issue and sort out fat versus fibrosis better.  She is breast feeding and so need to hold on mri.  She says she forgot to do the labs.  : lipase 22 and normal. Glu 94 and bun 8 and cr 0.60 and na 139 and k 3.9 and cl 103 and co2 23 and ca 9.3 and alb 4.3 and tb 0.3 and alk 147 and down from 190 on sept 15. Ast 26 and alt 19. Prior ast 35 and alt 27. Wbc 5.5 and hg 11.7 and hct 35.8 and plat 206. Mcv 88 and neutrophils 4.2 and lymphs 0.8. So doing better and we will follow course.   labs showed liver fraction 74%, bone fraction 23% and intestinal fraction 3% so this confirms that the alkaline phosphatase is mainly coming from liver which is the normal pattern. Albumin 4.4 bilirubin 0.4, direct bilirubin 0.6 alkaline phosphatase 165 up from 163 back in  for comparison.  AST 29 ALT 20 and previous AST 23 and 16 back on .  Sept 15: glu 73 and bun 11 and cr 0.76 and na 142 and k 4.4 and cl 103 and co2 22 and ca 9.6 and total protein 8.0 and alb 4.4 and tb 0.4 and alk 190 (up from 174 and prior 142.) Ast 35 and alt 27. So these are higher.  July limited hepatitis alk 163 and ast 23 and alt 16 they were lower.  Wbc 4.1 and hg 12.5 and hct 40.0 and mcv 88 and platelets 225.  MCHC or mean corpuscular hemoglobin concentration 31.3 little low but up from 29.8 prior. Please share with local provider. Neutrophils 2.8 and lymphs 0.9.   Per notes, on aza 50mg po qd and prednisone 2mg po qd and ursodiol 300mg po tid (increased in July).  She stopped the fenugreek like medicine (crackers took) a few weeks ago for 2 weeks month ago.  [employer] L[employer] Cheddar crisps: have turmeric in them.  Prior she had started iron pills and taking them less so.          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   labs show alb 4.1 and tb 0.3 and direct bilirubin less than 0.1 and elevated alk phos of 163 and ast 23 and alt 16.  Inc ursodiol 600mg to 900mg.  Shelly 15 labs alk 174 and ast 34 and alt 31. So seems like liver labs are settling a little lower.    ultrasound shows partially visualized pancreas unremarkable. The liver parenchyma however it did appear to be coarsened but with no mass. Gallbladder thin-walled and no stones. Common bile duct normal at 2 mm.  Right kidney 8.9 cm with no hydronephrosis.  Spleen normal at 9.6 cm. Liver vessels patent. They mention that the coarsening could represent early chronic liver disease.  On your last year ultrasound the liver had some coarsening and even mild surface nodularity seen, so even though it does not appear at first glance to be better, the description that they used is milder now. 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   labs show white blood cell count 4.1 hemoglobin 12.1 platelet count 219 MCV 89 neutrophils 1.6 and lymphocytes 1.9.  These are all in normal range. Glucose 166 still up and down from 361 last time.  BUN of 10 creatinine 0.73 which is now much better than previous 1.45 cr of April.  Please share with primary provider. Sodium 142 potassium 4.0 albumin 4.2.  Total bilirubin 0.4 alkaline phosphatase 68 and AST normal at 33 down from 55.  ALT 28 normal down from 49.  Ideal ALT is less than 25. Look forward to seeing you soon.  May 2022 Did TH with pt. due to itching will reduce dose of radha to bid dosing. max dose around 900mg. continue pred 2mg for now has been on this since 2022.  pt had baby girl on 22,   Dr. Wakefield worked with her in pregnancy.  She stayed on imuran during pregnancy. At start prednisone 10mg 8/10/21 and radha bid and weaned as per ob.     labs show normal lipase of 28 previously 31.  Glucose still slightly up at 106 previously 109 BUN of 11 creatinine 0.68 sodium 136 potassium 4.4 calcium 9.5 albumin 4.6 bilirubin 0.4 alkaline phosphatase 90 AST 35 ALT 25.  Prior AST 28 and ALT 17 so slightly higher.  Prior alkaline phosphatase 88.   White blood cell count 8.4 hemoglobin trending better at 10.9 but still low but better than prior10.8 and prior 10.3.  MCV normal 86 platelet count 302 up from 233.  Neutrophils normal at 7 lymphocytes 0.8.   2021 u/s: ultrasound shows the partially visualized pancreas to be unremarkable.  Liver has a coarsened echotexture to its parenchyma and with mild surface nodularity.  No focal mass seen. Gallbladder was thin-walled and the common bile duct was normal at 2 mm.  Right kidney measured 8.5 cm with no hydronephrosis.  Spleen normal 10 cm.  No ascites seen.  Liver vessels were patent. They overall felt that your liver parenchyma appeared to have cirrhotic features and with no focal mass.  Plan: 1. Staying the course and needs the labs. 2. She will do the u,s ib . 3. DO labs now and in 6w and 3m pending those, 4. Pending the labs may do mri to better see the issue.  Stressed to pt the need for social distancing and strict handwashing and wearing a mask and to follow any other new or added CDC recommendations as this is an evolving target.  Duration of the visit was 30 min with 10 min of chart prep reviewing data and information that was available for the visit and setting up in ecw and then an additional 20 min for the telemed visit today with time spent reviewing said material and their clinical correlates and then with this information being used to formulate a treatment plan. [id number]

## 2022-12-10 ENCOUNTER — LAB OUTSIDE AN ENCOUNTER (OUTPATIENT)
Dept: URBAN - METROPOLITAN AREA CLINIC 86 | Facility: CLINIC | Age: 32
End: 2022-12-10

## 2022-12-17 LAB
A/G RATIO: 1.3
ALBUMIN: 4.4
ALKALINE PHOSPHATASE: 163
ALT (SGPT): 19
AST (SGOT): 25
BASO (ABSOLUTE): 0
BASOS: 0
BILIRUBIN, TOTAL: 0.5
BUN/CREATININE RATIO: 21
BUN: 17
CALCIUM: 9.6
CARBON DIOXIDE, TOTAL: 22
CHLORIDE: 102
CREATININE: 0.81
EGFR: 98
EOS (ABSOLUTE): 0
EOS: 1
GLOBULIN, TOTAL: 3.4
GLUCOSE: 120
HEMATOCRIT: 38.5
HEMATOLOGY COMMENTS:: (no result)
HEMOGLOBIN: 12.7
IMMATURE CELLS: (no result)
IMMATURE GRANS (ABS): 0
IMMATURE GRANULOCYTES: 0
INR: 1
LIPASE: 22
LYMPHS (ABSOLUTE): 0.9
LYMPHS: 14
MCH: 29.4
MCHC: 33
MCV: 89
MONOCYTES(ABSOLUTE): 0.3
MONOCYTES: 5
NEUTROPHILS (ABSOLUTE): 4.9
NEUTROPHILS: 80
NRBC: (no result)
PLATELETS: 194
POTASSIUM: 3.4
PROTEIN, TOTAL: 7.8
PROTHROMBIN TIME: 10.8
RBC: 4.32
RDW: 13.2
SODIUM: 142
WBC: 6.2

## 2023-01-20 ENCOUNTER — LAB OUTSIDE AN ENCOUNTER (OUTPATIENT)
Dept: URBAN - METROPOLITAN AREA TELEHEALTH 2 | Facility: TELEHEALTH | Age: 33
End: 2023-01-20

## 2023-02-22 ENCOUNTER — WEB ENCOUNTER (OUTPATIENT)
Dept: URBAN - METROPOLITAN AREA CLINIC 92 | Facility: CLINIC | Age: 33
End: 2023-02-22

## 2023-03-09 ENCOUNTER — LAB OUTSIDE AN ENCOUNTER (OUTPATIENT)
Dept: URBAN - METROPOLITAN AREA TELEHEALTH 2 | Facility: TELEHEALTH | Age: 33
End: 2023-03-09

## 2023-03-09 ENCOUNTER — TELEPHONE ENCOUNTER (OUTPATIENT)
Dept: URBAN - METROPOLITAN AREA CLINIC 92 | Facility: CLINIC | Age: 33
End: 2023-03-09

## 2023-03-09 LAB
A/G RATIO: 1.3
ALBUMIN: 4.3
ALKALINE PHOSPHATASE: 173
ALT (SGPT): 20
AST (SGOT): 28
BASO (ABSOLUTE): 0
BASOS: 1
BILIRUBIN, TOTAL: 0.4
BUN/CREATININE RATIO: 12
BUN: 9
CALCIUM: 9.4
CARBON DIOXIDE, TOTAL: 26
CHLORIDE: 105
CREATININE: 0.78
EGFR: 102
EOS (ABSOLUTE): 0
EOS: 1
GLOBULIN, TOTAL: 3.4
GLUCOSE: 91
HEMATOCRIT: 39.1
HEMATOLOGY COMMENTS:: (no result)
HEMOGLOBIN: 12.9
IMMATURE CELLS: (no result)
IMMATURE GRANS (ABS): 0
IMMATURE GRANULOCYTES: 0
INR: 1
LIPASE: 22
LYMPHS (ABSOLUTE): 0.9
LYMPHS: 26
MCH: 30.1
MCHC: 33
MCV: 91
MONOCYTES(ABSOLUTE): 0.3
MONOCYTES: 9
NEUTROPHILS (ABSOLUTE): 2.3
NEUTROPHILS: 63
NRBC: (no result)
PLATELETS: 224
POTASSIUM: 3.7
PROTEIN, TOTAL: 7.7
PROTHROMBIN TIME: 10.8
RBC: 4.29
RDW: 13.7
SODIUM: 141
WBC: 3.6

## 2023-03-09 NOTE — HPI-TODAY'S VISIT:
Beverley Combs, March 8 labs showed WBC 3.6 hemoglobin 12.9 platelet count 224 MCV 91 and neutrophils 2.3 lymphocytes 0.9 and these are stable.  Glucose down to 91 and BUN of 9 creatinine 0.78 sodium 141 potassium normal at 3.7 calcium 9.4 albumin 4.3 total bilirubin 0.4.  Alk phos still up to 173 from previous 163.  AST 28 and ALT 20 and previously AST 25 and ALT 19. Lipase was normal at 22.  INR normal at 1.0. Per notes you are on the ursodiol 300 mg 3 times a day, the azathioprine 50 mg once a day, and the prednisone 2 mg once a day. With the rising labs we cannot drop anything in fact need to talk about therefore did not make any adjustments if we do not have a clear explanation for why the alk phos is rising. You have an appointment tomorrow and we will discuss the labs and options with you then.  Dr. Isbell

## 2023-03-10 ENCOUNTER — OFFICE VISIT (OUTPATIENT)
Dept: URBAN - METROPOLITAN AREA TELEHEALTH 2 | Facility: TELEHEALTH | Age: 33
End: 2023-03-10
Payer: COMMERCIAL

## 2023-03-10 VITALS — WEIGHT: 125 LBS | HEIGHT: 62 IN | BODY MASS INDEX: 23 KG/M2

## 2023-03-10 DIAGNOSIS — K75.4 AUTOIMMUNE HEPATITIS: ICD-10-CM

## 2023-03-10 DIAGNOSIS — K74.02 HEPATIC FIBROSIS, ADVANCED FIBROSIS: ICD-10-CM

## 2023-03-10 DIAGNOSIS — Z79.899 HIGH RISK MEDICATION USE: ICD-10-CM

## 2023-03-10 DIAGNOSIS — R74.8 ABNORMAL LIVER ENZYMES: ICD-10-CM

## 2023-03-10 PROBLEM — 128926000 POSTPROCEDURAL STATES: Status: ACTIVE | Noted: 2021-03-17

## 2023-03-10 PROBLEM — 408335007: Status: ACTIVE | Noted: 2021-02-09

## 2023-03-10 PROBLEM — 161646004 H/O: HIGH RISK MEDICATION: Status: ACTIVE | Noted: 2021-03-17

## 2023-03-10 PROBLEM — 409063005 COUNSELING: Status: ACTIVE | Noted: 2021-03-17

## 2023-03-10 PROBLEM — 166643006 LIVER ENZYMES ABNORMAL: Status: ACTIVE | Noted: 2021-03-17

## 2023-03-10 PROCEDURE — 99214 OFFICE O/P EST MOD 30 MIN: CPT

## 2023-03-10 RX ORDER — PREDNISONE 1 MG/1
2 TABLETS WITH FOOD OR MILK (TOTAL OF 2MG DAILY) ORALLY ONCE A DAY 30 DAY(S) TABLET ORAL
Qty: 180 | Refills: 1 | Status: ACTIVE | COMMUNITY

## 2023-03-10 RX ORDER — URSODIOL 300 MG/1
1 CAPSULE CAPSULE ORAL
Qty: 270 | Refills: 1 | OUTPATIENT

## 2023-03-10 RX ORDER — FERROUS SULFATE 325(65) MG
1 TABLET TABLET ORAL TIW
Status: ACTIVE | COMMUNITY

## 2023-03-10 RX ORDER — PREDNISONE 1 MG/1
2 TABLETS WITH FOOD OR MILK (TOTAL OF 2MG DAILY) ORALLY ONCE A DAY 30 DAY(S) TABLET ORAL
Qty: 180 | Refills: 1

## 2023-03-10 RX ORDER — VALACYCLOVIR 500 MG/1
1 TABLET TABLET, FILM COATED ORAL BID PRN
Status: ON HOLD | COMMUNITY

## 2023-03-10 RX ORDER — AZATHIOPRINE 50 MG/1
TAKE 1 TABLET BY MOUTH DAILY TABLET ORAL
Qty: 90 TABLET | Refills: 1 | Status: ACTIVE | COMMUNITY

## 2023-03-10 RX ORDER — AZATHIOPRINE 50 MG/1
TAKE 1 TABLET BY MOUTH DAILY TABLET ORAL
Qty: 90 TABLET | Refills: 1

## 2023-03-10 RX ORDER — URSODIOL 300 MG/1
1 CAPSULE CAPSULE ORAL THREE TIMES A DAY
Qty: 270 CAPSULE | Refills: 1 | Status: ACTIVE | COMMUNITY

## 2023-03-10 NOTE — HPI-TODAY'S VISIT:
Pt is a 33 year old female who presents post Dec 2022 visit for follow up of AI post pregnancy and she delivered 2022.  Did labs wed at [facility] will get.  3-9-2023 wbc 3.6 hg 12.9 and plat 224 and mcv 91 and neutrophils 2.3 and lymphs 0.9.  glu 91 and bun 9 and cr 0.78 an na 141 and k 3.7 and cl 105 and co2 26 and alb 4.3 and tb 0.4 and alk 173 and  ast 28 and alt 20.  Prior alk 163 so that went up some. Lpase 22 and inr 1.09.  She feels well.  Baby she is going to be one already.   ultrasound shows liver echogenicity appears increased and with no focal lesions. Common bile duct was normal at 2 mm. Gallbladder appeared normal. No significant ascites was seen. Right kidney was 9.2 cm and no hydronephrosis was seen. Liver vessels were patent. Spleen was normal in size. Overall the liver was echogenic in keeping with possible chronic liver disease but that also could be related to fatty liver. Need to look at your lab patterns. Patent vessels were seen. Long-term we can talk about trying to get an MRI at some point electively to look deeper at this liver issue and sort out fat versus fibrosis better.  She stopped breast feeding  She says has forgotten to take 3rd ursodiol and so maybe take 1/2 of this.  : lipase 22 and normal. Glu 94 and bun 8 and cr 0.60 and na 139 and k 3.9 and cl 103 and co2 23 and ca 9.3 and alb 4.3 and tb 0.3 and alk 147 and down from 190 on sept 15. Ast 26 and alt 19. Prior ast 35 and alt 27. Wbc 5.5 and hg 11.7 and hct 35.8 and plat 206. Mcv 88 and neutrophils 4.2 and lymphs 0.8. So doing better and we will follow course.   labs showed liver fraction 74%, bone fraction 23% and intestinal fraction 3% so this confirms that the alkaline phosphatase is mainly coming from liver which is the normal pattern. Albumin 4.4 bilirubin 0.4, direct bilirubin 0.6 alkaline phosphatase 165 up from 163 back in  for comparison.  AST 29 ALT 20 and previous AST 23 and 16 back on .  Sept 15: glu 73 and bun 11 and cr 0.76 and na 142 and k 4.4 and cl 103 and co2 22 and ca 9.6 and total protein 8.0 and alb 4.4 and tb 0.4 and alk 190 (up from 174 and prior 142.) Ast 35 and alt 27. So these are higher.  July limited hepatitis alk 163 and ast 23 and alt 16 they were lower.  Wbc 4.1 and hg 12.5 and hct 40.0 and mcv 88 and platelets 225.  MCHC or mean corpuscular hemoglobin concentration 31.3 little low but up from 29.8 prior. Please share with local provider. Neutrophils 2.8 and lymphs 0.9.   Per notes, on aza 50mg po qd and prednisone 2mg po qd and ursodiol 300mg po tid (increased in July).  She stopped the fenugreek like medicine (crackers took) a few weeks ago for 2 weeks month ago. [employer] Lact[employer] Cheddar crisps: have turmeric in them.  Prior she had started iron pills and taking them less so.          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   labs show alb 4.1 and tb 0.3 and direct bilirubin less than 0.1 and elevated alk phos of 163 and ast 23 and alt 16.  Inc ursodiol 600mg to 900mg.  Shelly 15 labs alk 174 and ast 34 and alt 31. So seems like liver labs are settling a little lower.    ultrasound shows partially visualized pancreas unremarkable. The liver parenchyma however it did appear to be coarsened but with no mass. Gallbladder thin-walled and no stones. Common bile duct normal at 2 mm.  Right kidney 8.9 cm with no hydronephrosis.  Spleen normal at 9.6 cm. Liver vessels patent. They mention that the coarsening could represent early chronic liver disease.  On your last year ultrasound the liver had some coarsening and even mild surface nodularity seen, so even though it does not appear at first glance to be better, the description that they used is milder now. 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   labs show white blood cell count 4.1 hemoglobin 12.1 platelet count 219 MCV 89 neutrophils 1.6 and lymphocytes 1.9.  These are all in normal range. Glucose 166 still up and down from 361 last time.  BUN of 10 creatinine 0.73 which is now much better than previous 1.45 cr of April.  Please share with primary provider. Sodium 142 potassium 4.0 albumin 4.2.  Total bilirubin 0.4 alkaline phosphatase 68 and AST normal at 33 down from 55.  ALT 28 normal down from 49.  Ideal ALT is less than 25. Look forward to seeing you soon.  May 2022 Did TH with pt. due to itching will reduce dose of radha to bid dosing. max dose around 900mg. continue pred 2mg for now has been on this since 2022.  pt had baby girl on 22,   Dr. Wakefield worked with her in prior pregnancy.  She stayed on imuran during pregnancy. At start prednisone 10mg 8/10/21 and radha bid and weaned as per ob.     labs show normal lipase of 28 previously 31.  Glucose still slightly up at 106 previously 109 BUN of 11 creatinine 0.68 sodium 136 potassium 4.4 calcium 9.5 albumin 4.6 bilirubin 0.4 alkaline phosphatase 90 AST 35 ALT 25.  Prior AST 28 and ALT 17 so slightly higher.  Prior alkaline phosphatase 88.   White blood cell count 8.4 hemoglobin trending better at 10.9 but still low but better than prior10.8 and prior 10.3.  MCV normal 86 platelet count 302 up from 233.  Neutrophils normal at 7 lymphocytes 0.8.   2021 u/s: ultrasound shows the partially visualized pancreas to be unremarkable.  Liver has a coarsened echotexture to its parenchyma and with mild surface nodularity.  No focal mass seen. Gallbladder was thin-walled and the common bile duct was normal at 2 mm.  Right kidney measured 8.5 cm with no hydronephrosis.  Spleen normal 10 cm.  No ascites seen.  Liver vessels were patent. They overall felt that your liver parenchyma appeared to have cirrhotic features and with no focal mass.  Plan: 1. She will try to do better on ursodiol. 2. Pt will need to do the u.s in . Maybe mri pending the labs and course. 3. DO labs in 6 and 12 weeks. 4. See us in 3m.   Stressed to pt the need for social distancing and strict handwashing and wearing a mask and to follow any other new or added CDC recommendations as this is an evolving target.  Duration of the visit was 30 min with 10 min of chart prep reviewing data and information that was available for the visit and setting up in ecw and then an additional 20 min for the [facility] telemed visit today with time spent reviewing said material and their clinical correlates and then with this information being used to formulate a treatment plan.

## 2023-04-24 ENCOUNTER — LAB OUTSIDE AN ENCOUNTER (OUTPATIENT)
Dept: URBAN - METROPOLITAN AREA TELEHEALTH 2 | Facility: TELEHEALTH | Age: 33
End: 2023-04-24

## 2023-06-05 ENCOUNTER — LAB OUTSIDE AN ENCOUNTER (OUTPATIENT)
Dept: URBAN - METROPOLITAN AREA TELEHEALTH 2 | Facility: TELEHEALTH | Age: 33
End: 2023-06-05

## 2023-06-08 ENCOUNTER — LAB OUTSIDE AN ENCOUNTER (OUTPATIENT)
Dept: URBAN - METROPOLITAN AREA TELEHEALTH 2 | Facility: TELEHEALTH | Age: 33
End: 2023-06-08

## 2023-06-15 ENCOUNTER — OFFICE VISIT (OUTPATIENT)
Dept: URBAN - METROPOLITAN AREA CLINIC 86 | Facility: CLINIC | Age: 33
End: 2023-06-15
Payer: COMMERCIAL

## 2023-06-15 ENCOUNTER — OFFICE VISIT (OUTPATIENT)
Dept: URBAN - METROPOLITAN AREA CLINIC 95 | Facility: CLINIC | Age: 33
End: 2023-06-15
Payer: COMMERCIAL

## 2023-06-15 ENCOUNTER — TELEPHONE ENCOUNTER (OUTPATIENT)
Dept: URBAN - METROPOLITAN AREA CLINIC 86 | Facility: CLINIC | Age: 33
End: 2023-06-15

## 2023-06-15 ENCOUNTER — OFFICE VISIT (OUTPATIENT)
Dept: URBAN - METROPOLITAN AREA CLINIC 91 | Facility: CLINIC | Age: 33
End: 2023-06-15

## 2023-06-15 VITALS
DIASTOLIC BLOOD PRESSURE: 77 MMHG | HEIGHT: 62 IN | TEMPERATURE: 96.9 F | SYSTOLIC BLOOD PRESSURE: 113 MMHG | WEIGHT: 116 LBS | HEART RATE: 106 BPM | BODY MASS INDEX: 21.35 KG/M2

## 2023-06-15 DIAGNOSIS — R74.8 ABNORMAL LIVER ENZYMES: ICD-10-CM

## 2023-06-15 DIAGNOSIS — Z71.89 VACCINE COUNSELING: ICD-10-CM

## 2023-06-15 DIAGNOSIS — K74.02 HEPATIC FIBROSIS, ADVANCED FIBROSIS: ICD-10-CM

## 2023-06-15 DIAGNOSIS — Z98.890 HISTORY OF LIVER BIOPSY: ICD-10-CM

## 2023-06-15 DIAGNOSIS — K75.4 AUTOIMMUNE HEPATITIS: ICD-10-CM

## 2023-06-15 DIAGNOSIS — R76.9 ABNORMAL IMMUNOLOGICAL FINDING IN SERUM: ICD-10-CM

## 2023-06-15 DIAGNOSIS — K74.69 OTHER CIRRHOSIS OF LIVER: ICD-10-CM

## 2023-06-15 DIAGNOSIS — Z79.899 HIGH RISK MEDICATION USE: ICD-10-CM

## 2023-06-15 LAB
ALBUMIN: 4.2
ALKALINE PHOSPHATASE: 130
ALT (SGPT): 17
AST (SGOT): 25
BASO (ABSOLUTE): 0
BASOS: 1
BILIRUBIN, DIRECT: 0.11
BILIRUBIN, TOTAL: 0.4
BUN/CREATININE RATIO: 11
BUN: 9
CARBON DIOXIDE, TOTAL: 24
CHLORIDE: 104
CREATININE: 0.84
EGFR: 93
EOS (ABSOLUTE): 0.1
EOS: 2
GLUCOSE: 84
HEMATOCRIT: 37.2
HEMATOLOGY COMMENTS:: (no result)
HEMOGLOBIN: 12.1
IMMATURE CELLS: (no result)
IMMATURE GRANS (ABS): 0
IMMATURE GRANULOCYTES: 1
LIPASE: 26
LYMPHS (ABSOLUTE): 1.3
LYMPHS: 30
MCH: 30.3
MCHC: 32.5
MCV: 93
MONOCYTES(ABSOLUTE): 0.4
MONOCYTES: 8
NEUTROPHILS (ABSOLUTE): 2.5
NEUTROPHILS: 58
NRBC: (no result)
PLATELETS: 225
POTASSIUM: 4
PROTEIN, TOTAL: 7.3
RBC: 3.99
RDW: 12.2
SODIUM: 141
WBC: 4.4

## 2023-06-15 PROCEDURE — 99214 OFFICE O/P EST MOD 30 MIN: CPT

## 2023-06-15 PROCEDURE — 93975 VASCULAR STUDY: CPT

## 2023-06-15 PROCEDURE — 76705 ECHO EXAM OF ABDOMEN: CPT

## 2023-06-15 RX ORDER — PREDNISONE 1 MG/1
2 TABLETS WITH FOOD OR MILK (TOTAL OF 2MG DAILY) ORALLY ONCE A DAY 30 DAY(S) TABLET ORAL
Qty: 180 | Refills: 1

## 2023-06-15 RX ORDER — FERROUS SULFATE 325(65) MG
1 TABLET TABLET ORAL TIW
Status: ACTIVE | COMMUNITY

## 2023-06-15 RX ORDER — PREDNISONE 1 MG/1
2 TABLETS WITH FOOD OR MILK (TOTAL OF 2MG DAILY) ORALLY ONCE A DAY 30 DAY(S) TABLET ORAL
Qty: 180 | Refills: 1 | Status: ACTIVE | COMMUNITY

## 2023-06-15 RX ORDER — URSODIOL 300 MG/1
1 CAPSULE CAPSULE ORAL
Qty: 270 | Refills: 1 | OUTPATIENT

## 2023-06-15 RX ORDER — URSODIOL 300 MG/1
1 CAPSULE CAPSULE ORAL
Qty: 270 | Refills: 1 | Status: ACTIVE | COMMUNITY

## 2023-06-15 RX ORDER — AZATHIOPRINE 50 MG/1
TAKE 1 TABLET BY MOUTH DAILY TABLET ORAL
Qty: 90 TABLET | Refills: 1 | Status: ACTIVE | COMMUNITY

## 2023-06-15 RX ORDER — VALACYCLOVIR 500 MG/1
1 TABLET TABLET, FILM COATED ORAL BID PRN
Status: ON HOLD | COMMUNITY

## 2023-06-15 RX ORDER — AZATHIOPRINE 50 MG/1
TAKE 1 TABLET BY MOUTH DAILY TABLET ORAL
Qty: 90 TABLET | Refills: 1

## 2023-06-15 NOTE — HPI-TODAY'S VISIT:
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  She did labs yesterday and did not do the u.s. and she will do at 145 at [facility].  Right radha 300mg tid, aza 50mg po qd, 2mg po qd and since pregnancy.  3-9-2023 wbc 3.6 hg 12.9 and plat 224 and mcv 91 and neutrophils 2.3 and lymphs 0.9.  glu 91 and bun 9 and cr 0.78 an na 141 and k 3.7 and cl 105 and co2 26 and alb 4.3 and tb 0.4 and alk 173 and  ast 28 and alt 20.  Prior alk 163 so that went up some. Lpase 22 and inr 1.09.  Baby is 15 months old.  She is not planning another.  2022 ultrasound shows liver echogenicity appears increased and with no focal lesions. Common bile duct was normal at 2 mm. Gallbladder appeared normal. No significant ascites was seen. Right kidney was 9.2 cm and no hydronephrosis was seen. Liver vessels were patent. Spleen was normal in size. Overall the liver was echogenic in keeping with possible chronic liver disease but that also could be related to fatty liver. Need to look at your lab patterns. Patent vessels were seen. Long-term we can talk about trying to get an MRI at some point electively to look deeper at this liver issue and sort out fat versus fibrosis better.  She stopped breast feeding in 2023 and went for 11 m.  2022 : lipase 22 and normal. Glu 94 and bun 8 and cr 0.60 and na 139 and k 3.9 and cl 103 and co2 23 and ca 9.3 and alb 4.3 and tb 0.3 and alk 147 and down from 190 on sept 15. Ast 26 and alt 19. Prior ast 35 and alt 27. Wbc 5.5 and hg 11.7 and hct 35.8 and plat 206. Mcv 88 and neutrophils 4.2 and lymphs 0.8.    labs showed liver fraction 74%, bone fraction 23% and intestinal fraction 3% so this confirms that the alkaline phosphatase is mainly coming from liver which is the normal pattern. Albumin 4.4 bilirubin 0.4, direct bilirubin 0.6 alkaline phosphatase 165 up from 163 back in  for comparison.  AST 29 ALT 20 and previous AST 23 and 16 back on .  Sept 15: glu 73 and bun 11 and cr 0.76 and na 142 and k 4.4 and cl 103 and co2 22 and ca 9.6 and total protein 8.0 and alb 4.4 and tb 0.4 and alk 190 (up from 174 and prior 142.) Ast 35 and alt 27. So these are higher.  July limited hepatitis alk 163 and ast 23 and alt 16 they were lower.  Wbc 4.1 and hg 12.5 and hct 40.0 and mcv 88 and platelets 225.  MCHC or mean corpuscular hemoglobin concentration 31.3 little low but up from 29.8 prior. Please share with local provider. Neutrophils 2.8 and lymphs 0.9.   Per notes, on aza 50mg po qd and prednisone 2mg po qd and ursodiol 300mg po tid (increased in July).  She stopped the fenugreek like medicine (crackers took) a few weeks ago for 2 weeks month ago. [employer] Lactation Cheddar crisps: have turmeric in them.  Prior she had started iron pills and taking them less so.          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   labs show alb 4.1 and tb 0.3 and direct bilirubin less than 0.1 and elevated alk phos of 163 and ast 23 and alt 16.  Shelly 15 labs alk 174 and ast 34 and alt 31.   ultrasound shows partially visualized pancreas unremarkable. The liver parenchyma however it did appear to be coarsened but with no mass. Gallbladder thin-walled and no stones. Common bile duct normal at 2 mm.  Right kidney 8.9 cm with no hydronephrosis.  Spleen normal at 9.6 cm. Liver vessels patent. They mention that the coarsening could represent early chronic liver disease.  On your last year ultrasound the liver had some coarsening and even mild surface nodularity seen, so even though it does not appear at first glance to be better, the description that they used is milder now. 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   labs show white blood cell count 4.1 hemoglobin 12.1 platelet count 219 MCV 89 neutrophils 1.6 and lymphocytes 1.9.  These are all in normal range. Glucose 166 still up and down from 361 last time.  BUN of 10 creatinine 0.73 which is now much better than previous 1.45 cr of April.  Please share with primary provider. Sodium 142 potassium 4.0 albumin 4.2.  Total bilirubin 0.4 alkaline phosphatase 68 and AST normal at 33 down from 55.  ALT 28 normal down from 49.  Ideal ALT is less than 25. Look forward to seeing you soon.  May 2022 Did TH with pt. due to itching will reduce dose of radha to bid dosing. max dose around 900mg. continue pred 2mg for now has been on this since 2022.  pt had baby girl on 22,   Dr. Wakefield worked with her in prior pregnancy.  She stayed on imuran during pregnancy. At start prednisone 10mg 8/10/21 and radha bid and weaned as per ob.     labs show normal lipase of 28 previously 31.  Glucose still slightly up at 106 previously 109 BUN of 11 creatinine 0.68 sodium 136 potassium 4.4 calcium 9.5 albumin 4.6 bilirubin 0.4 alkaline phosphatase 90 AST 35 ALT 25.  Prior AST 28 and ALT 17 so slightly higher.  Prior alkaline phosphatase 88.   White blood cell count 8.4 hemoglobin trending better at 10.9 but still low but better than prior10.8 and prior 10.3.  MCV normal 86 platelet count 302 up from 233.  Neutrophils normal at 7 lymphocytes 0.8.   2021 u/s: ultrasound shows the partially visualized pancreas to be unremarkable.  Liver has a coarsened echotexture to its parenchyma and with mild surface nodularity.  No focal mass seen. Gallbladder was thin-walled and the common bile duct was normal at 2 mm.  Right kidney measured 8.5 cm with no hydronephrosis.  Spleen normal 10 cm.  No ascites seen.  Liver vessels were patent. They overall felt that your liver parenchyma appeared to have cirrhotic features and with no focal mass.  Plan: 1. We are waiting on labs. 2. Pt will do the u.s 145 to get there. 3. Pt will likely drop the prednisone to 1mg a day pending the labs, 4. Plan  for labs in 3 and 6  weeks and 12 weeks. 5. Telemed in 3m.   Duration of the visit was 35 min with 10 min of chart prep reviewing data and information that was available for the visit and setting up in ecw and then an additional 25 min for the [facility] telemed visit today with time spent reviewing said material and their clinical correlates and then with this information being used to formulate a treatment plan.  Data: 2023 wbc 4.4 hg 12.1 platelet 225 and mcv 93 and glu 84 and bun 9 and cr 0.84 and na 141 and k 4.0 and cl 104 and co2 24 and alb 4.2 tb 0.4 and db 0.11 and alk 130 and ast 25 and alt 17. lipase 26.  Plan is to drop to 1mg pred and do the labs in 3 and 6 and 12 weeks.

## 2023-06-15 NOTE — HPI-TODAY'S VISIT:
Beverley Combs, June 14 labs showed lipase normal at 26 WBC 4.4 hemoglobin 12.1 platelet count 225 MCV 93 neutrophils 2.5 lymphocytes 1.3.  Glucose 84 BUN of 9 creatinine 0.84 sodium 141 potassium 4.0 albumin 4.2 bilirubin 0.4 alk phos slightly elevated so at 130 but down from 165 in September.  AST down to 25 and ALT down to 17. As we discussed, would recommend that you cut the steroid dose down to 1 mg a day and follow the labs and see how you do at 3, 6, and 12 weeks.  Curiously we sometimes have to go much slower and the drops as we get to these lower levels. Dr Isbell

## 2023-06-16 ENCOUNTER — TELEPHONE ENCOUNTER (OUTPATIENT)
Dept: URBAN - METROPOLITAN AREA CLINIC 86 | Facility: CLINIC | Age: 33
End: 2023-06-16

## 2023-06-16 NOTE — HPI-TODAY'S VISIT:
Beverley Julio Cesartimmy Garret, Shelly 15 ultrasound shows liver coarsened in echotexture and nodular contour suggesting cirrhosis.  No lesions were seen.  Important to do this every 6 months for surveillance. Bile ducts not dilated and common bile duct 1.7 mm. Gallbladder normal.  Skinner sign negative. Pancreas normal were seen. Right kidney 8 point centimeters with no hydronephrosis.  Spleen normal at 9.6 cm. Liver vessels were patent. No ascites seen. In summary they felt she had a cirrhotic liver morphology but no suspicious lesions. Very important to keep your labs as normal as possible for the best chance for this to heal over time. We will plan to redo the scan again in 6 months. Discussed the us with the pt. Explained the issues to her. She does need to look to get the egd done to look for varices. Dr. Isbell

## 2023-06-21 ENCOUNTER — LAB OUTSIDE AN ENCOUNTER (OUTPATIENT)
Dept: URBAN - METROPOLITAN AREA CLINIC 92 | Facility: CLINIC | Age: 33
End: 2023-06-21

## 2023-06-21 ENCOUNTER — WEB ENCOUNTER (OUTPATIENT)
Dept: URBAN - METROPOLITAN AREA CLINIC 92 | Facility: CLINIC | Age: 33
End: 2023-06-21

## 2023-06-21 RX ORDER — PREDNISONE 1 MG/1
1 TABLETS WITH FOOD OR MILK ( DOSE CHANGE NEW TOTAL OF 1 MG DAILY) ORALLY ONCE A DAY 30 DAY(S) TABLET ORAL ONCE A DAY
Qty: 90 | Refills: 0
End: 2023-12-18

## 2023-06-21 NOTE — HPI-TODAY'S VISIT:
Called the pt and this is not main stream. I could not find any reputable articles re naltrexone use for aih. Found a few web sites that advocate this but it would not be considered main stream. Her fiancee desean mentioned this being used for this indication. I advised that I do not feel comfortable with this and would not order and so they are welcome to seek out someone more familiar with it and have them order and manage her isp if that is their desire. She is doing well and weaning steroids and am hopeful that as time passes from pregnancy and she works on issues that we will be able to get labs lower and wean steroids off and try to lower other isp as can.  Pt also stated needs pred 1mg po qd to be sent in as she has 5mg tabks left and needing to cut these to thjat dose and so needed new script as pharmacy would not give her this yet. sent to pharmacy to see if can get now.  also went over that her fibrosis is not new: started stage 3 and  in July 2021 u.s with similar coarsening and nodular changes and then that slowly was better dec 2022 scan the liver was getting by u.s andnow and scan post pregnancy showed more nodular again and not clear if from the pregnancy or weight issues or other flare related and hope as labs drop will get better. she would like to do mri to assess fibrosis now and not wait and we will order that to be done and that seems reasonable. also to see Dr Iniguez for the egd.  Then she needs appt to discuss in person and review the egd and the mri and your labs and how doing at that point.

## 2023-07-06 ENCOUNTER — LAB OUTSIDE AN ENCOUNTER (OUTPATIENT)
Dept: URBAN - METROPOLITAN AREA CLINIC 86 | Facility: CLINIC | Age: 33
End: 2023-07-06

## 2023-07-08 ENCOUNTER — TELEPHONE ENCOUNTER (OUTPATIENT)
Dept: URBAN - METROPOLITAN AREA CLINIC 86 | Facility: CLINIC | Age: 33
End: 2023-07-08

## 2023-07-08 LAB
A/G RATIO: 1.5
ALBUMIN: 4.4
ALKALINE PHOSPHATASE: 128
ALT (SGPT): 20
AST (SGOT): 25
BASO (ABSOLUTE): 0
BASOS: 0
BILIRUBIN, TOTAL: 0.5
BUN/CREATININE RATIO: 10
BUN: 7
CALCIUM: 9.3
CARBON DIOXIDE, TOTAL: 21
CHLORIDE: 102
CREATININE: 0.71
EGFR: 114
EOS (ABSOLUTE): 0
EOS: 1
GLOBULIN, TOTAL: 3
GLUCOSE: 114
HEMATOCRIT: 37.7
HEMATOLOGY COMMENTS:: (no result)
HEMOGLOBIN: 12.3
IMMATURE CELLS: (no result)
IMMATURE GRANS (ABS): 0
IMMATURE GRANULOCYTES: 0
LIPASE: 18
LYMPHS (ABSOLUTE): 0.9
LYMPHS: 18
MCH: 29.9
MCHC: 32.6
MCV: 92
MONOCYTES(ABSOLUTE): 0.3
MONOCYTES: 7
NEUTROPHILS (ABSOLUTE): 3.6
NEUTROPHILS: 74
NRBC: (no result)
PLATELETS: 208
POTASSIUM: 3.8
PROTEIN, TOTAL: 7.4
RBC: 4.12
RDW: 12.2
SODIUM: 139
WBC: 5

## 2023-07-08 NOTE — HPI-TODAY'S VISIT:
Dear Paty Combs, July 7 labs show lipase normal at 18, sugar a little elevated at 114.  Had you been fasting that day?  Please share with local providers. BUN of 7 creatinine 0.71 sodium 139 potassium 3.8 albumin 4.4 bili 0.5 and alk phos almost normal now at 128.  Normal is less than 121.  Previously in March it had been 173 for comparison.  AST 25 and ALT 20 with ideal ALT less than 25. WBC 5 hemoglobin 12.3 plate count 208 MCV 92 and normal neutrophils and lymphocytes. Has the MRI been set up yet? Regarding today's labs, the note from Shelly 15 mentions how you had cut back steroids down to 1 mg a day now. If so, these labs would suggest that you are tolerating this well and we need to continue to watch and slowly taper this so that your body can acclimate to the drop. Please do the labs to monitor this. Dr Isbell

## 2023-07-12 ENCOUNTER — ERX REFILL RESPONSE (OUTPATIENT)
Dept: URBAN - METROPOLITAN AREA CLINIC 86 | Facility: CLINIC | Age: 33
End: 2023-07-12

## 2023-07-12 RX ORDER — PREDNISONE 1 MG/1
1 TABLETS WITH FOOD OR MILK ( DOSE CHANGE NEW TOTAL OF 1 MG DAILY) ORALLY ONCE A DAY 30 DAY(S) TABLET ORAL ONCE A DAY
Qty: 90 | Refills: 0 | OUTPATIENT

## 2023-07-27 ENCOUNTER — LAB OUTSIDE AN ENCOUNTER (OUTPATIENT)
Dept: URBAN - METROPOLITAN AREA CLINIC 86 | Facility: CLINIC | Age: 33
End: 2023-07-27

## 2023-08-01 ENCOUNTER — TELEPHONE ENCOUNTER (OUTPATIENT)
Dept: URBAN - METROPOLITAN AREA CLINIC 86 | Facility: CLINIC | Age: 33
End: 2023-08-01

## 2023-08-01 LAB
A/G RATIO: 1.7
ALBUMIN: 4.5
ALKALINE PHOSPHATASE: 122
ALT (SGPT): 16
AST (SGOT): 27
BASO (ABSOLUTE): 0
BASOS: 1
BILIRUBIN, TOTAL: 0.3
BUN/CREATININE RATIO: 15
BUN: 11
CALCIUM: 9.3
CARBON DIOXIDE, TOTAL: 23
CHLORIDE: 104
CREATININE: 0.73
EGFR: 111
EOS (ABSOLUTE): 0.1
EOS: 2
GLOBULIN, TOTAL: 2.7
GLUCOSE: 87
HEMATOCRIT: 38.3
HEMATOLOGY COMMENTS:: (no result)
HEMOGLOBIN: 12.3
IMMATURE CELLS: (no result)
IMMATURE GRANS (ABS): 0
IMMATURE GRANULOCYTES: 0
LIPASE: 31
LYMPHS (ABSOLUTE): 1.2
LYMPHS: 25
MCH: 29.6
MCHC: 32.1
MCV: 92
MONOCYTES(ABSOLUTE): 0.4
MONOCYTES: 9
NEUTROPHILS (ABSOLUTE): 3
NEUTROPHILS: 63
NRBC: (no result)
PLATELETS: 156
POTASSIUM: 4
PROTEIN, TOTAL: 7.2
RBC: 4.15
RDW: 12.7
SODIUM: 140
WBC: 4.8

## 2023-08-01 NOTE — HPI-TODAY'S VISIT:
Dear Talia Combs, July 26 labs show lipase normal at 31, glucose normal at 87 BUN of 11 creatinine 0.73 sodium 140 potassium 4.0 calcium 9.3 albumin 4.5 bilirubin 0.3 which is down from 0.5. Good to see the alk phos be almost normal now at 122 from previous 128 and prior 173.  Very happy to see that it is almost normal.  AST 27 and ALT 16 which are doing well.  Ideal ALT less than 25. WBC 4.8 hemoglobin 12.3 plate count 156 MCV 92 with normal neutrophils and lymphocytes. At our last contact on July 8, it states that you are down to 1 mg a day and so that is good to see that this remains stable.  it is very important to taper these very low doses of steroids slowly as we are doing.  Please do the monitoring labs again as we discussed before the next visit in September. Dr. Isbell

## 2023-08-03 ENCOUNTER — TELEPHONE ENCOUNTER (OUTPATIENT)
Dept: URBAN - METROPOLITAN AREA CLINIC 86 | Facility: CLINIC | Age: 33
End: 2023-08-03

## 2023-08-03 NOTE — HPI-TODAY'S VISIT:
Beverley Shinalonso, August 3 MRI shows lower thorax with limited images to the lung bases being unremarkable. Liver with no significant fat or iron deposition.  Mild heterogeneity seen of the liver.  Liver appeared to be mildly small in size. There were no definite morphologic changes of advanced cirrhosis.  There were no suspicious liver lesions. The gallbladder was present and contains some sludge or concentrated bile within it.   No bile duct dilation seen. Spleen was normal at 8.9 cm. No dilation seen in the main pancreatic duct and no suspicious lesions. Adrenal glands normal. Kidneys showed no hydronephrosis or focal renal lesion. There was no bowel obstruction sign seen and incidentally noted gastric diverticulum is seen along the posterior aspect of the gastric fundus measuring 2.2 x 1.9 cm. Mildly prominent lymph nodes were seen around the liver.  They were felt to be nonspecific possibly reactive. Liver vessels were patent.  I do suspect however there was some reflux of contrast material into the left gonadal vein. Small volume fluid was seen in the pelvis which I thought was possibly physiologic which is there with a live possibly this was near the time of your cycle. In summary, mild nonspecific heterogeneous changes seen of the liver with no definite changes of bands cirrhosis.  No splenomegaly seen and no ascites.  Liver vessels patent. This is good to see and it confirms that the liver probably does have some scarring changes because of your liver disease history but the good news is that there are no signs of any advanced cirrhosis which was the concern. Dr Isbell

## 2023-08-09 ENCOUNTER — WEB ENCOUNTER (OUTPATIENT)
Dept: URBAN - METROPOLITAN AREA CLINIC 86 | Facility: CLINIC | Age: 33
End: 2023-08-09

## 2023-08-28 ENCOUNTER — OFFICE VISIT (OUTPATIENT)
Dept: URBAN - METROPOLITAN AREA TELEHEALTH 2 | Facility: TELEHEALTH | Age: 33
End: 2023-08-28
Payer: COMMERCIAL

## 2023-08-28 ENCOUNTER — TELEPHONE ENCOUNTER (OUTPATIENT)
Dept: URBAN - METROPOLITAN AREA CLINIC 17 | Facility: CLINIC | Age: 33
End: 2023-08-28

## 2023-08-28 ENCOUNTER — LAB OUTSIDE AN ENCOUNTER (OUTPATIENT)
Dept: URBAN - METROPOLITAN AREA TELEHEALTH 2 | Facility: TELEHEALTH | Age: 33
End: 2023-08-28

## 2023-08-28 VITALS — BODY MASS INDEX: 21.16 KG/M2 | WEIGHT: 115 LBS | HEIGHT: 62 IN

## 2023-08-28 DIAGNOSIS — K74.02 HEPATIC FIBROSIS, ADVANCED FIBROSIS: ICD-10-CM

## 2023-08-28 DIAGNOSIS — Z98.890 HISTORY OF LIVER BIOPSY: ICD-10-CM

## 2023-08-28 DIAGNOSIS — R74.8 ABNORMAL LIVER ENZYMES: ICD-10-CM

## 2023-08-28 DIAGNOSIS — K75.4 AUTOIMMUNE HEPATITIS: ICD-10-CM

## 2023-08-28 PROCEDURE — 99213 OFFICE O/P EST LOW 20 MIN: CPT | Performed by: INTERNAL MEDICINE

## 2023-08-28 RX ORDER — FERROUS SULFATE 325(65) MG
1 TABLET TABLET ORAL TIW
Status: ACTIVE | COMMUNITY

## 2023-08-28 RX ORDER — VALACYCLOVIR 500 MG/1
1 TABLET TABLET, FILM COATED ORAL BID PRN
Status: ACTIVE | COMMUNITY

## 2023-08-28 RX ORDER — PREDNISONE 1 MG/1
1 TABLETS WITH FOOD OR MILK ( DOSE CHANGE NEW TOTAL OF 1 MG DAILY) ORALLY ONCE A DAY 30 DAY(S) TABLET ORAL ONCE A DAY
Qty: 90 | Refills: 0 | Status: ACTIVE | COMMUNITY

## 2023-08-28 RX ORDER — AZATHIOPRINE 50 MG/1
TAKE 1 TABLET BY MOUTH DAILY TABLET ORAL
Qty: 90 TABLET | Refills: 1 | Status: ACTIVE | COMMUNITY

## 2023-08-28 RX ORDER — URSODIOL 300 MG/1
1 CAPSULE CAPSULE ORAL
Qty: 270 | Refills: 1 | Status: ACTIVE | COMMUNITY

## 2023-08-28 NOTE — HPI-TODAY'S VISIT:
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  She is followed by Dr. Isbell.    An US with doppler June 2023 demonstrated cirrhotic liver morphology with no suspicious liver lesions.  A follow-up MRI August 2023 reports mild heterogeneity of the liver.  The liver is mildly small in size with no definite morphologic changes of advanced cirrhosis.   She denies abdominal pain, weight loss, dark tarry stools, bright red blood per rectum, GERD and dysphagia.  She has no gastrointestinal complaints at this time.

## 2023-09-01 ENCOUNTER — OFFICE VISIT (OUTPATIENT)
Dept: URBAN - METROPOLITAN AREA TELEHEALTH 2 | Facility: TELEHEALTH | Age: 33
End: 2023-09-01
Payer: COMMERCIAL

## 2023-09-01 VITALS — HEIGHT: 62 IN | WEIGHT: 115 LBS | BODY MASS INDEX: 21.16 KG/M2

## 2023-09-01 DIAGNOSIS — K74.02 HEPATIC FIBROSIS, ADVANCED FIBROSIS: ICD-10-CM

## 2023-09-01 DIAGNOSIS — K75.4 AUTOIMMUNE HEPATITIS: ICD-10-CM

## 2023-09-01 DIAGNOSIS — R76.9 ABNORMAL IMMUNOLOGICAL FINDING IN SERUM: ICD-10-CM

## 2023-09-01 DIAGNOSIS — R74.8 ABNORMAL LIVER ENZYMES: ICD-10-CM

## 2023-09-01 PROCEDURE — 99214 OFFICE O/P EST MOD 30 MIN: CPT

## 2023-09-01 RX ORDER — CHOLECALCIFEROL (VITAMIN D3) 50 MCG
1 TABLET CAPSULE ORAL ONCE A DAY
Status: ACTIVE | COMMUNITY

## 2023-09-01 RX ORDER — PREDNISONE 1 MG/1
1 TABLETS WITH FOOD OR MILK ( DOSE CHANGE NEW TOTAL OF 1 MG DAILY) ORALLY ONCE A DAY 30 DAY(S) TABLET ORAL ONCE A DAY
Qty: 90 | Refills: 0 | Status: ACTIVE | COMMUNITY

## 2023-09-01 RX ORDER — VALACYCLOVIR 500 MG/1
1 TABLET TABLET, FILM COATED ORAL BID PRN
Status: ACTIVE | COMMUNITY

## 2023-09-01 RX ORDER — AZATHIOPRINE 50 MG/1
TAKE 1 TABLET BY MOUTH DAILY TABLET ORAL
Qty: 90 TABLET | Refills: 1 | Status: ACTIVE | COMMUNITY

## 2023-09-01 RX ORDER — URSODIOL 300 MG/1
1 CAPSULE CAPSULE ORAL
Qty: 270 | Refills: 1 | Status: ACTIVE | COMMUNITY

## 2023-09-01 RX ORDER — URSODIOL 250 MG/1
1 TABLET WITH FOOD TABLET ORAL
Qty: 270 | Refills: 1 | OUTPATIENT

## 2023-09-01 RX ORDER — PREDNISONE 1 MG/1
0.5 TABLET WITH FOOD TABLET ORAL ONCE A DAY
Qty: 45 TABLET | Refills: 1

## 2023-09-01 RX ORDER — AZATHIOPRINE 50 MG/1
TAKE 1 TABLET BY MOUTH DAILY TABLET ORAL
Qty: 90 TABLET | Refills: 1

## 2023-09-01 RX ORDER — FERROUS SULFATE 325(65) MG
1 TABLET TABLET ORAL TIW
Status: ACTIVE | COMMUNITY

## 2023-09-01 NOTE — HPI-TODAY'S VISIT:
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  August 3 MRI shows lower thorax with limited images to the lung bases being unremarkable. Liver with no significant fat or iron deposition.  Mild heterogeneity seen of the liver.  Liver appeared to be mildly small in size. There were no definite morphologic changes of advanced cirrhosis.  There were no suspicious liver lesions. The gallbladder was present and contains some sludge or concentrated bile within it.   No bile duct dilation seen. Spleen was normal at 8.9 cm. No dilation seen in the main pancreatic duct and no suspicious lesions. Adrenal glands normal. Kidneys showed no hydronephrosis or focal renal lesion. There was no bowel obstruction sign seen and incidentally noted gastric diverticulum is seen along the posterior aspect of the gastric fundus measuring 2.2 x 1.9 cm. She has not done an egd and did see Dr Iniguez re this. No recall of ulcer. Mildly prominent lymph nodes were seen around the liver.  They were felt to be nonspecific possibly reactive. Liver vessels were patent.  I do suspect however there was some reflux of contrast material into the left gonadal vein. Small volume fluid was seen in the pelvis which I thought was possibly physiologic which is there with a live possibly this was near the time of your cycle. In summary, mild nonspecific heterogeneous changes seen of the liver with no definite changes of cirrhosis.  No splenomegaly seen and no ascites.  Liver vessels patent.  This is good to see and it confirms that the liver probably does have some scarring changes because of your liver disease history but the good news is that there are no signs of any advanced cirrhosis which was the concern.   labs show lipase normal at 31, glucose normal at 87 BUN of 11 creatinine 0.73 sodium 140 potassium 4.0 calcium 9.3 albumin 4.5 bilirubin 0.3 which is down from 0.5. Good to see the alk phos be almost normal now at 122 from previous 128 and prior 173.  Very happy to see that it is almost normal.  AST 27 and ALT 16 which are doing well.  Ideal ALT less than 25. WBC 4.8 hemoglobin 12.3 platelet count 156 MCV 92 with normal neutrophils and lymphocytes. At our last contact on , it states that you are down to 1 mg a day and so that is good to see that this remains stable.  it is very important to taper these very low doses of steroids slowly as we are doing.  Please do the monitoring labs again as we discussed before the next visit in September.   labs show lipase normal at 18, sugar a little elevated at 114.  Had you been fasting that day?  Please share with local providers. BUN of 7 creatinine 0.71 sodium 139 potassium 3.8 albumin 4.4 bili 0.5 and alk phos almost normal now at 128.  Normal is less than 121.  Previously in March it had been 173 for comparison.  AST 25 and ALT 20 with ideal ALT less than 25. WBC 5 hemoglobin 12.3 plate count 208 MCV 92 and normal neutrophils and lymphocytes.  Regarding today's labs, the note from [facility] mentions how you had cut back steroids down to 1 mg a day now. If so, these labs would suggest that you are tolerating this well and we need to continue to watch and slowly taper this so that your body can acclimate to the drop.   2023 wbc 4.4 hg 12.1 platelet 225 and mcv 93 and glu 84 and bun 9 and cr 0.84 and na 141 and k 4.0 and cl 104 and co2 24 and alb 4.2 tb 0.4 and db 0.11 and alk 130 and ast 25 and alt 17. lipase 26.  Plan is to drop to 1mg pred and do the labs in 3 and 6 and 12 weeks.  [facility] ultrasound shows liver coarsened in echotexture and nodular contour suggesting cirrhosis.  No lesions were seen.  Important to do this every 6 months for surveillance. Bile ducts not dilated and common bile duct 1.7 mm. Gallbladder normal.  Skinner sign negative. Pancreas normal were seen. Right kidney 8 point centimeters with no hydronephrosis.  Spleen normal at 9.6 cm. Liver vessels were patent. No ascites seen. In summary they felt she had a cirrhotic liver morphology but no suspicious lesions. Very important to keep your labs as normal as possible for the best chance for this to heal over time. We will plan to redo the scan again in 6 months. Discussed the us with the pt. Explained the issues to her. She does need to look to get the egd done to look for varices.  She did not have covid 19 around the time of the u,s.  Right now meds radha 300mg tid, aza 50mg po qd, 1mg po qd.  Vit d3 and 2000 iu po qd calcium with vit d 3 x week.  3-9-2023 wbc 3.6 hg 12.9 and plat 224 and mcv 91 and neutrophils 2.3 and lymphs 0.9.  glu 91 and bun 9 and cr 0.78 an na 141 and k 3.7 and cl 105 and co2 26 and alb 4.3 and tb 0.4 and alk 173 and  ast 28 and alt 20.  Prior alk 163 so that went up some. Lpase 22 and inr 1.09.  She is not planning another.  2022 ultrasound shows liver echogenicity appears increased and with no focal lesions. Common bile duct was normal at 2 mm. Gallbladder appeared normal. No significant ascites was seen. Right kidney was 9.2 cm and no hydronephrosis was seen. Liver vessels were patent. Spleen was normal in size. Overall the liver was echogenic in keeping with possible chronic liver disease but that also could be related to fatty liver. Need to look at your lab patterns. Patent vessels were seen. Long-term we can talk about trying to get an MRI at some point electively to look deeper at this liver issue and sort out fat versus fibrosis better.  She stopped breast feeding in 2023 and went for 11 m.  2022 : lipase 22 and normal. Glu 94 and bun 8 and cr 0.60 and na 139 and k 3.9 and cl 103 and co2 23 and ca 9.3 and alb 4.3 and tb 0.3 and alk 147 and down from 190 on sept 15. Ast 26 and alt 19. Prior ast 35 and alt 27. Wbc 5.5 and hg 11.7 and hct 35.8 and plat 206. Mcv 88 and neutrophils 4.2 and lymphs 0.8.    labs showed liver fraction 74%, bone fraction 23% and intestinal fraction 3% so this confirms that the alkaline phosphatase is mainly coming from liver which is the normal pattern. Albumin 4.4 bilirubin 0.4, direct bilirubin 0.6 alkaline phosphatase 165 up from 163 back in  for comparison.  AST 29 ALT 20 and previous AST 23 and 16 back on .  Sept 15: glu 73 and bun 11 and cr 0.76 and na 142 and k 4.4 and cl 103 and co2 22 and ca 9.6 and total protein 8.0 and alb 4.4 and tb 0.4 and alk 190 (up from 174 and prior 142.) Ast 35 and alt 27. So these are higher.  July limited hepatitis alk 163 and ast 23 and alt 16 they were lower.  Wbc 4.1 and hg 12.5 and hct 40.0 and mcv 88 and platelets 225.  MCHC or mean corpuscular hemoglobin concentration 31.3 little low but up from 29.8 prior. Please share with local provider. Neutrophils 2.8 and lymphs 0.9.   Per notes, on aza 50mg po qd and prednisone 2mg po qd and ursodiol 300mg po tid (increased in July).  She stopped the fenugreek like medicine (crackers took) a few weeks ago for 2 weeks month ago. [employer] Lactation Cheddar crisps: have turmeric in them.  Prior she had started iron pills and taking them less so.          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   labs show alb 4.1 and tb 0.3 and direct bilirubin less than 0.1 and elevated alk phos of 163 and ast 23 and alt 16.  [facility] labs alk 174 and ast 34 and alt 31.   ultrasound shows partially visualized pancreas unremarkable. The liver parenchyma however it did appear to be coarsened but with no mass. Gallbladder thin-walled and no stones. Common bile duct normal at 2 mm.  Right kidney 8.9 cm with no hydronephrosis.  Spleen normal at 9.6 cm. Liver vessels patent. They mention that the coarsening could represent early chronic liver disease.  On your last year ultrasound the liver had some coarsening and even mild surface nodularity seen, so even though it does not appear at first glance to be better, the description that they used is milder now. 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   labs show white blood cell count 4.1 hemoglobin 12.1 platelet count 219 MCV 89 neutrophils 1.6 and lymphocytes 1.9.  These are all in normal range. Glucose 166 still up and down from 361 last time.  BUN of 10 creatinine 0.73 which is now much better than previous 1.45 cr of April.  Please share with primary provider. Sodium 142 potassium 4.0 albumin 4.2.  Total bilirubin 0.4 alkaline phosphatase 68 and AST normal at 33 down from 55.  ALT 28 normal down from 49.  Ideal ALT is less than 25. Look forward to seeing you soon.  May 2022 Did TH with pt. due to itching will reduce dose of radha to bid dosing. max dose around 900mg. continue pred 2mg for now has been on this since 2022.  pt had baby girl on 22,   Dr. Wakefield worked with her in prior pregnancy.  She stayed on imuran during pregnancy. At start prednisone 10mg 8/10/21 and radha bid and weaned as per ob.     labs show normal lipase of 28 previously 31.  Glucose still slightly up at 106 previously 109 BUN of 11 creatinine 0.68 sodium 136 potassium 4.4 calcium 9.5 albumin 4.6 bilirubin 0.4 alkaline phosphatase 90 AST 35 ALT 25.  Prior AST 28 and ALT 17 so slightly higher.  Prior alkaline phosphatase 88.   White blood cell count 8.4 hemoglobin trending better at 10.9 but still low but better than prior10.8 and prior 10.3.  MCV normal 86 platelet count 302 up from 233.  Neutrophils normal at 7 lymphocytes 0.8.   2021 u/s: ultrasound shows the partially visualized pancreas to be unremarkable.  Liver has a coarsened echotexture to its parenchyma and with mild surface nodularity.  No focal mass seen. Gallbladder was thin-walled and the common bile duct was normal at 2 mm.  Right kidney measured 8.5 cm with no hydronephrosis.  Spleen normal 10 cm.  No ascites seen.  Liver vessels were patent. They overall felt that your liver parenchyma appeared to have cirrhotic features and with no focal mass.  Plan: 1. SHe will do labs when can. 2. If they are stable we can try to drop to 1mg qod vs 0.5mg po qd. 3. She will do labs then in 1m and 3m.  4. Telemed in 3m. 5. Feb u.s. 6. Dropped radha to 250mg tid due to her weight macx radha 784mg. She said capsule little hard to swallow and this may be easier.  Duration of the visit was  36 min with 10 min of chart prep reviewing data and information that was available for the visit and setting up in ecw and then an additional 26 min for the [facility] telemed visit today with time spent reviewing said material and their clinical correlates and then with this information being used to formulate a treatment plan.

## 2023-09-06 ENCOUNTER — WEB ENCOUNTER (OUTPATIENT)
Dept: URBAN - METROPOLITAN AREA CLINIC 40 | Facility: CLINIC | Age: 33
End: 2023-09-06

## 2023-09-14 ENCOUNTER — OUT OF OFFICE VISIT (OUTPATIENT)
Dept: URBAN - METROPOLITAN AREA SURGERY CENTER 16 | Facility: SURGERY CENTER | Age: 33
End: 2023-09-14
Payer: COMMERCIAL

## 2023-09-14 DIAGNOSIS — Z98.890 H/O ABDOMINAL AORTIC ANEURYSM REPAIR: ICD-10-CM

## 2023-09-14 DIAGNOSIS — K57.10 DIVERTICULA OF SMALL INTESTINE: ICD-10-CM

## 2023-09-14 DIAGNOSIS — K31.4 DIVERTICULUM OF STOMACH: ICD-10-CM

## 2023-09-14 DIAGNOSIS — K74.69 CIRRHOSIS, CRYPTOGENIC: ICD-10-CM

## 2023-09-14 DIAGNOSIS — Z13.89 ENCOUNTER FOR IMAGING TO SCREEN FOR METAL PRIOR TO MAGNETIC RESONANCE IMAGING (MRI): ICD-10-CM

## 2023-09-14 DIAGNOSIS — Z13.89 ENCOUNTER FOR SCREENING FOR OTHER DISORDER: ICD-10-CM

## 2023-09-14 PROCEDURE — 43235 EGD DIAGNOSTIC BRUSH WASH: CPT | Performed by: INTERNAL MEDICINE

## 2023-09-14 PROCEDURE — 00731 ANES UPR GI NDSC PX NOS: CPT | Performed by: ANESTHESIOLOGY

## 2023-09-14 PROCEDURE — G8907 PT DOC NO EVENTS ON DISCHARG: HCPCS | Performed by: INTERNAL MEDICINE

## 2023-09-14 PROCEDURE — 00731 ANES UPR GI NDSC PX NOS: CPT | Performed by: NURSE ANESTHETIST, CERTIFIED REGISTERED

## 2023-09-14 RX ORDER — AZATHIOPRINE 50 MG/1
TAKE 1 TABLET BY MOUTH DAILY TABLET ORAL
Qty: 90 TABLET | Refills: 1 | Status: ACTIVE | COMMUNITY

## 2023-09-14 RX ORDER — FERROUS SULFATE 325(65) MG
1 TABLET TABLET ORAL TIW
Status: ACTIVE | COMMUNITY

## 2023-09-14 RX ORDER — PREDNISONE 1 MG/1
0.5 TABLET WITH FOOD TABLET ORAL ONCE A DAY
Qty: 45 TABLET | Refills: 1 | Status: ACTIVE | COMMUNITY

## 2023-09-14 RX ORDER — CHOLECALCIFEROL (VITAMIN D3) 50 MCG
1 TABLET CAPSULE ORAL ONCE A DAY
Status: ACTIVE | COMMUNITY

## 2023-09-14 RX ORDER — VALACYCLOVIR 500 MG/1
1 TABLET TABLET, FILM COATED ORAL BID PRN
Status: ACTIVE | COMMUNITY

## 2023-09-14 RX ORDER — URSODIOL 250 MG/1
1 TABLET WITH FOOD TABLET ORAL
Qty: 270 | Refills: 1 | Status: ACTIVE | COMMUNITY

## 2023-09-14 RX ORDER — PREDNISONE 1 MG/1
1 TABLETS WITH FOOD OR MILK ( DOSE CHANGE NEW TOTAL OF 1 MG DAILY) ORALLY ONCE A DAY 30 DAY(S) TABLET ORAL ONCE A DAY
Qty: 90 | Refills: 0 | Status: ACTIVE | COMMUNITY

## 2023-09-15 ENCOUNTER — LAB OUTSIDE AN ENCOUNTER (OUTPATIENT)
Dept: URBAN - METROPOLITAN AREA CLINIC 86 | Facility: CLINIC | Age: 33
End: 2023-09-15

## 2023-09-22 ENCOUNTER — LAB OUTSIDE AN ENCOUNTER (OUTPATIENT)
Dept: URBAN - METROPOLITAN AREA TELEHEALTH 2 | Facility: TELEHEALTH | Age: 33
End: 2023-09-22

## 2023-10-14 ENCOUNTER — TELEPHONE ENCOUNTER (OUTPATIENT)
Dept: URBAN - METROPOLITAN AREA CLINIC 86 | Facility: CLINIC | Age: 33
End: 2023-10-14

## 2023-10-14 LAB
A/G RATIO: 1.5
ALBUMIN: 4.4
ALKALINE PHOSPHATASE: 124
ALT (SGPT): 10
AST (SGOT): 24
BASO (ABSOLUTE): 0
BASOS: 1
BILIRUBIN, TOTAL: 0.6
BUN/CREATININE RATIO: 13
BUN: 9
CALCIUM: 9.3
CARBON DIOXIDE, TOTAL: 23
CHLORIDE: 102
CREATININE: 0.68
EGFR: 117
EOS (ABSOLUTE): 0.1
EOS: 2
GLOBULIN, TOTAL: 3
GLUCOSE: 92
HEMATOCRIT: 36.2
HEMATOLOGY COMMENTS:: (no result)
HEMOGLOBIN: 12.1
IMMATURE CELLS: (no result)
IMMATURE GRANS (ABS): 0
IMMATURE GRANULOCYTES: 0
LIPASE: 29
LYMPHS (ABSOLUTE): 1
LYMPHS: 21
MCH: 30.1
MCHC: 33.4
MCV: 90
MONOCYTES(ABSOLUTE): 0.4
MONOCYTES: 7
NEUTROPHILS (ABSOLUTE): 3.4
NEUTROPHILS: 69
NRBC: (no result)
PLATELETS: 187
POTASSIUM: 3.5
PROTEIN, TOTAL: 7.4
RBC: 4.02
RDW: 12.9
SODIUM: 139
WBC: 4.8

## 2023-10-14 NOTE — HPI-TODAY'S VISIT:
Dear Paty Combs, October 13 labs showed lipase 29 glucose 92 BUN of 9 creatinine 0.68 sodium 139 potassium 3.5 calcium 9.3 bilirubin 0.6 alk phos 124 which is slightly higher than before when it was 122.  Normal is from 44 up to 121.AST 24 and ALT 10. Ideal alt is less than 25.When I see the alkaline phosphatase remains only slightly up such as this, I wonder about your vitamin D level.  Please check with primary provider as that may be a little low and that could potentially be explaining the alkaline phosphatase being slightly up due to increased bone turnover from. Could also be due to slight ursodiol dose correction for weight last time.WBC 4.8, hemoglobin 12.1 platelet count 187 MCV 90 with normal neutrophils 3.4 and lymphocytes 1.0.Per notes last change was ursodiol dose slight adjustment to 250mg three times a day. Given increase alk phos would redo labs again previsit in Dec and see if the labs continue to settle more. Dr Isbell

## 2023-11-01 ENCOUNTER — LAB OUTSIDE AN ENCOUNTER (OUTPATIENT)
Dept: URBAN - METROPOLITAN AREA TELEHEALTH 2 | Facility: TELEHEALTH | Age: 33
End: 2023-11-01

## 2023-12-01 ENCOUNTER — LAB OUTSIDE AN ENCOUNTER (OUTPATIENT)
Dept: URBAN - METROPOLITAN AREA TELEHEALTH 2 | Facility: TELEHEALTH | Age: 33
End: 2023-12-01

## 2023-12-01 ENCOUNTER — TELEPHONE ENCOUNTER (OUTPATIENT)
Dept: URBAN - METROPOLITAN AREA CLINIC 86 | Facility: CLINIC | Age: 33
End: 2023-12-01

## 2023-12-01 LAB
A/G RATIO: 1.5
ALBUMIN: 4.1
ALKALINE PHOSPHATASE: 114
ALT (SGPT): 20
AST (SGOT): 26
BASO (ABSOLUTE): 0
BASOS: 0
BILIRUBIN, TOTAL: 0.6
BUN/CREATININE RATIO: 17
BUN: 12
CALCIUM: 9
CARBON DIOXIDE, TOTAL: 24
CHLORIDE: 107
CREATININE: 0.69
EGFR: 117
EOS (ABSOLUTE): 0.1
EOS: 2
GLOBULIN, TOTAL: 2.8
GLUCOSE: 79
HEMATOCRIT: 35.3
HEMATOLOGY COMMENTS:: (no result)
HEMOGLOBIN: 11.4
IMMATURE CELLS: (no result)
IMMATURE GRANS (ABS): 0
IMMATURE GRANULOCYTES: 0
LIPASE: 38
LYMPHS (ABSOLUTE): 1
LYMPHS: 21
MCH: 30.2
MCHC: 32.3
MCV: 93
MONOCYTES(ABSOLUTE): 0.5
MONOCYTES: 11
NEUTROPHILS (ABSOLUTE): 3
NEUTROPHILS: 66
NRBC: (no result)
PLATELETS: 198
POTASSIUM: 3.7
PROTEIN, TOTAL: 6.9
RBC: 3.78
RDW: 13
SODIUM: 142
WBC: 4.6

## 2023-12-01 NOTE — HPI-TODAY'S VISIT:
Beverley Combs, November 30 labs showed WBC 4.6 hemoglobin 11.4 platelet count 198 MCV 93 with normal neutrophils and lymphocytes. Glucose 79 BUN of 12 creatinine 0.69 sodium 142 and potassium 3.7 and these are normal. Chloride was slightly up at 107 which is nonspecific. Calcium 9.0 albumin 4.1 bilirubin 0.6 and alk phos normal now at 114 which is good to see. AST of 26 and ALT of 20 with prior AST of 24 and ALT of 10 so approximately the same in the AST but definitely a little higher on the ALT. I do ALT less than 25. Lipase was normal slightly higher at 38. Dr Isbell

## 2023-12-08 ENCOUNTER — OFFICE VISIT (OUTPATIENT)
Dept: URBAN - METROPOLITAN AREA TELEHEALTH 2 | Facility: TELEHEALTH | Age: 33
End: 2023-12-08
Payer: COMMERCIAL

## 2023-12-08 VITALS — HEIGHT: 62 IN | BODY MASS INDEX: 20.24 KG/M2 | WEIGHT: 110 LBS

## 2023-12-08 DIAGNOSIS — K74.02 HEPATIC FIBROSIS, ADVANCED FIBROSIS: ICD-10-CM

## 2023-12-08 DIAGNOSIS — K75.4 AUTOIMMUNE HEPATITIS: ICD-10-CM

## 2023-12-08 DIAGNOSIS — R74.8 ABNORMAL LIVER ENZYMES: ICD-10-CM

## 2023-12-08 DIAGNOSIS — R76.9 ABNORMAL IMMUNOLOGICAL FINDING IN SERUM: ICD-10-CM

## 2023-12-08 PROCEDURE — 99214 OFFICE O/P EST MOD 30 MIN: CPT

## 2023-12-08 RX ORDER — CHOLECALCIFEROL (VITAMIN D3) 50 MCG
1 TABLET CAPSULE ORAL ONCE A DAY
Status: ACTIVE | COMMUNITY

## 2023-12-08 RX ORDER — URSODIOL 250 MG/1
1 TABLET WITH FOOD TABLET ORAL
Qty: 270 | Refills: 1 | OUTPATIENT

## 2023-12-08 RX ORDER — PREDNISONE 1 MG/1
1 TABLET WITH FOOD EVERY OTHER DAY TABLET ORAL EVERY OTHER DAY
Qty: 45 | Refills: 1

## 2023-12-08 RX ORDER — VALACYCLOVIR 500 MG/1
1 TABLET TABLET, FILM COATED ORAL BID PRN
Status: ACTIVE | COMMUNITY

## 2023-12-08 RX ORDER — AZATHIOPRINE 50 MG/1
TAKE 1 TABLET BY MOUTH DAILY TABLET ORAL
Qty: 90 TABLET | Refills: 1

## 2023-12-08 RX ORDER — PREDNISONE 1 MG/1
1 TABLETS WITH FOOD OR MILK ( DOSE CHANGE NEW TOTAL OF 1 MG DAILY) ORALLY ONCE A DAY 30 DAY(S) TABLET ORAL ONCE A DAY
Qty: 90 | Refills: 0 | Status: ACTIVE | COMMUNITY

## 2023-12-08 RX ORDER — PREDNISONE 1 MG/1
1 TABLET TABLET ORAL
Refills: 1 | Status: ACTIVE | COMMUNITY

## 2023-12-08 RX ORDER — AZATHIOPRINE 50 MG/1
TAKE 1 TABLET BY MOUTH DAILY TABLET ORAL
Qty: 90 TABLET | Refills: 1 | Status: ACTIVE | COMMUNITY

## 2023-12-08 RX ORDER — URSODIOL 250 MG/1
1 TABLET WITH FOOD TABLET ORAL
Qty: 270 | Refills: 1 | Status: ACTIVE | COMMUNITY

## 2023-12-08 RX ORDER — FERROUS SULFATE 325(65) MG
1 TABLET TABLET ORAL TIW
Status: ACTIVE | COMMUNITY

## 2023-12-08 NOTE — HPI-TODAY'S VISIT:
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   labs showed WBC 4.6 hemoglobin 11.4 platelet count 198 MCV 93 with normal neutrophils and lymphocytes. Glucose 79 BUN of 12 creatinine 0.69 sodium 142 and potassium 3.7 and these are normal. Chloride was slightly up at 107 which is nonspecific. Calcium 9.0 albumin 4.1 bilirubin 0.6 and alk phos normal now at 114 which is good to see. AST of 26 and ALT of 20 with prior AST of 24 and ALT of 10 so approximately the same in the AST but definitely a little higher on the ALT. Ideal  ALT less than 25. Lipase was normal slightly higher at 38.  She says had a little cold at the time when did that and was in cycle.   labs showed lipase 29 glucose 92 BUN of 9 creatinine 0.68 sodium 139 potassium 3.5 calcium 9.3 bilirubin 0.6 alk phos 124 which is slightly higher than before when it was 122.  Normal is from 44 up to 121.AST 24 and ALT 10. Ideal alt is less than 25.When I see the alkaline phosphatase remains only slightly up such as this, I wonder about your vitamin D level.  Please check with primary provider as that may be a little low and that could potentially be explaining the alkaline phosphatase being slightly up due to increased bone turnover from. Could also be due to slight ursodiol dose correction for weight last time.WBC 4.8, hemoglobin 12.1 platelet count 187 MCV 90 with normal neutrophils 3.4 and lymphocytes 1.0.Per notes last change was ursodiol dose slight adjustment to 250mg three times a day. Given increase alk phos would redo labs again previsit in Dec and see if the labs continue to settle   She is still on meds.  August 3 MRI shows lower thorax with limited images to the lung bases being unremarkable. Liver with no significant fat or iron deposition.  Mild heterogeneity seen of the liver.  Liver appeared to be mildly small in size. There were no definite morphologic changes of advanced cirrhosis.  There were no suspicious liver lesions. The gallbladder was present and contains some sludge or concentrated bile within it.   No bile duct dilation seen. Spleen was normal at 8.9 cm. No dilation seen in the main pancreatic duct and no suspicious lesions. Adrenal glands normal. Kidneys showed no hydronephrosis or focal renal lesion. There was no bowel obstruction sign seen and incidentally noted gastric diverticulum is seen along the posterior aspect of the gastric fundus measuring 2.2 x 1.9 cm. She has not done an egd and did see Dr Iniguez re this. No recall of ulcer. Mildly prominent lymph nodes were seen around the liver.  They were felt to be nonspecific possibly reactive. Liver vessels were patent.  I do suspect however there was some reflux of contrast material into the left gonadal vein. Small volume fluid was seen in the pelvis which I thought was possibly physiologic which is there with a live possibly this was near the time of your cycle. In summary, mild nonspecific heterogeneous changes seen of the liver with no definite changes of cirrhosis.  No splenomegaly seen and no ascites.  Liver vessels patent.  This is good to see and it confirms that the liver probably does have some scarring changes because of your liver disease history but the good news is that there are no signs of any advanced cirrhosis which was the concern.   labs show lipase normal at 31, glucose normal at 87 BUN of 11 creatinine 0.73 sodium 140 potassium 4.0 calcium 9.3 albumin 4.5 bilirubin 0.3 which is down from 0.5. Good to see the alk phos be almost normal now at 122 from previous 128 and prior 173.  Very happy to see that it is almost normal.  AST 27 and ALT 16 which are doing well.  Ideal ALT less than 25. WBC 4.8 hemoglobin 12.3 platelet count 156 MCV 92 with normal neutrophils and lymphocytes. At our last contact on , it states that you are down to 1 mg a day and so that is good to see that this remains stable.  it is very important to taper these very low doses of steroids slowly as we are doing.  Please do the monitoring labs again as we discussed before the next visit in September.   labs show lipase normal at 18, sugar a little elevated at 114.  Had you been fasting that day?  Please share with local providers. BUN of 7 creatinine 0.71 sodium 139 potassium 3.8 albumin 4.4 bili 0.5 and alk phos almost normal now at 128.  Normal is less than 121.  Previously in March it had been 173 for comparison.  AST 25 and ALT 20 with ideal ALT less than 25. WBC 5 hemoglobin 12.3 plate count 208 MCV 92 and normal neutrophils and lymphocytes.  She is 1mg a day of steroid q other day and on that 2023.  Regarding today's labs, the note from [facility] mentions how you had cut back steroids down to 1 mg a day now. If so, these labs would suggest that you are tolerating this well and we need to continue to watch and slowly taper this so that your body can acclimate to the drop.   2023 wbc 4.4 hg 12.1 platelet 225 and mcv 93 and glu 84 and bun 9 and cr 0.84 and na 141 and k 4.0 and cl 104 and co2 24 and alb 4.2 tb 0.4 and db 0.11 and alk 130 and ast 25 and alt 17. lipase 26.  Plan is to drop to 1mg pred and do the labs in 3 and 6 and 12 weeks.  [facility] ultrasound shows liver coarsened in echotexture and nodular contour suggesting cirrhosis.  No lesions were seen.  Important to do this every 6 months for surveillance. Bile ducts not dilated and common bile duct 1.7 mm. Gallbladder normal.  Skinner sign negative. Pancreas normal were seen. Right kidney 8 point centimeters with no hydronephrosis.  Spleen normal at 9.6 cm. Liver vessels were patent. No ascites seen. In summary they felt she had a cirrhotic liver morphology but no suspicious lesions. Very important to keep your labs as normal as possible for the best chance for this to heal over time. We will plan to redo the scan again in 6 months. Discussed the us with the pt. Explained the issues to her. She does need to look to get the egd done to look for varices.  She did not have covid 19 around the time of the u,s.  Right now meds radha 300mg tid, aza 50mg po qd, 1mg po qd.  Vit d3 and 2000 iu po qd calcium with vit d 3 x week.  3-9-2023 wbc 3.6 hg 12.9 and plat 224 and mcv 91 and neutrophils 2.3 and lymphs 0.9.  glu 91 and bun 9 and cr 0.78 an na 141 and k 3.7 and cl 105 and co2 26 and alb 4.3 and tb 0.4 and alk 173 and  ast 28 and alt 20.  Prior alk 163 so that went up some. Lpase 22 and inr 1.09.  She is not planning another.  2022 ultrasound shows liver echogenicity appears increased and with no focal lesions. Common bile duct was normal at 2 mm. Gallbladder appeared normal. No significant ascites was seen. Right kidney was 9.2 cm and no hydronephrosis was seen. Liver vessels were patent. Spleen was normal in size. Overall the liver was echogenic in keeping with possible chronic liver disease but that also could be related to fatty liver. Need to look at your lab patterns. Patent vessels were seen. Long-term we can talk about trying to get an MRI at some point electively to look deeper at this liver issue and sort out fat versus fibrosis better.  She stopped breast feeding in 2023 and went for 11 m.  2022 : lipase 22 and normal. Glu 94 and bun 8 and cr 0.60 and na 139 and k 3.9 and cl 103 and co2 23 and ca 9.3 and alb 4.3 and tb 0.3 and alk 147 and down from 190 on sept 15. Ast 26 and alt 19. Prior ast 35 and alt 27. Wbc 5.5 and hg 11.7 and hct 35.8 and plat 206. Mcv 88 and neutrophils 4.2 and lymphs 0.8.    labs showed liver fraction 74%, bone fraction 23% and intestinal fraction 3% so this confirms that the alkaline phosphatase is mainly coming from liver which is the normal pattern. Albumin 4.4 bilirubin 0.4, direct bilirubin 0.6 alkaline phosphatase 165 up from 163 back in  for comparison.  AST 29 ALT 20 and previous AST 23 and 16 back on .  Sept 15: glu 73 and bun 11 and cr 0.76 and na 142 and k 4.4 and cl 103 and co2 22 and ca 9.6 and total protein 8.0 and alb 4.4 and tb 0.4 and alk 190 (up from 174 and prior 142.) Ast 35 and alt 27. So these are higher.  July limited hepatitis alk 163 and ast 23 and alt 16 they were lower.  Wbc 4.1 and hg 12.5 and hct 40.0 and mcv 88 and platelets 225.  MCHC or mean corpuscular hemoglobin concentration 31.3 little low but up from 29.8 prior. Please share with local provider. Neutrophils 2.8 and lymphs 0.9.   Per notes, on aza 50mg po qd and prednisone 2mg po qd and ursodiol 300mg po tid (increased in July).  She stopped the fenugreek like medicine (crackers took) a few weeks ago for 2 weeks month ago. [employer] Lact[employer] Cheddar crisps: have turmeric in them.  Prior she had started iron pills and taking them less so.          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   labs show alb 4.1 and tb 0.3 and direct bilirubin less than 0.1 and elevated alk phos of 163 and ast 23 and alt 16.  [facility] labs alk 174 and ast 34 and alt 31.   ultrasound shows partially visualized pancreas unremarkable. The liver parenchyma however it did appear to be coarsened but with no mass. Gallbladder thin-walled and no stones. Common bile duct normal at 2 mm.  Right kidney 8.9 cm with no hydronephrosis.  Spleen normal at 9.6 cm. Liver vessels patent. They mention that the coarsening could represent early chronic liver disease.  On your last year ultrasound the liver had some coarsening and even mild surface nodularity seen, so even though it does not appear at first glance to be better, the description that they used is milder now. 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   labs show white blood cell count 4.1 hemoglobin 12.1 platelet count 219 MCV 89 neutrophils 1.6 and lymphocytes 1.9.  These are all in normal range. Glucose 166 still up and down from 361 last time.  BUN of 10 creatinine 0.73 which is now much better than previous 1.45 cr of April.  Please share with primary provider. Sodium 142 potassium 4.0 albumin 4.2.  Total bilirubin 0.4 alkaline phosphatase 68 and AST normal at 33 down from 55.  ALT 28 normal down from 49.  Ideal ALT is less than 25. Look forward to seeing you soon.  May 2022 Did TH with pt. due to itching will reduce dose of radha to bid dosing. max dose around 900mg. continue pred 2mg for now has been on this since 2022.  pt had baby girl on 22,   Dr. Wakefield worked with her in prior pregnancy.  She stayed on imuran during pregnancy. At start prednisone 10mg 8/10/21 and radha bid and weaned as per ob.     labs show normal lipase of 28 previously 31.  Glucose still slightly up at 106 previously 109 BUN of 11 creatinine 0.68 sodium 136 potassium 4.4 calcium 9.5 albumin 4.6 bilirubin 0.4 alkaline phosphatase 90 AST 35 ALT 25.  Prior AST 28 and ALT 17 so slightly higher.  Prior alkaline phosphatase 88.   White blood cell count 8.4 hemoglobin trending better at 10.9 but still low but better than prior10.8 and prior 10.3.  MCV normal 86 platelet count 302 up from 233.  Neutrophils normal at 7 lymphocytes 0.8.   2021 u/s: ultrasound shows the partially visualized pancreas to be unremarkable.  Liver has a coarsened echotexture to its parenchyma and with mild surface nodularity.  No focal mass seen. Gallbladder was thin-walled and the common bile duct was normal at 2 mm.  Right kidney measured 8.5 cm with no hydronephrosis.  Spleen normal 10 cm.  No ascites seen.  Liver vessels were patent. They overall felt that your liver parenchyma appeared to have cirrhotic features and with no focal mass.  Plan: 1.  Need to redo the labs and see how doing. Plan to do labs in 1m again, 2.  2024 due for the u.s. 3. Want to go lower on the steroids as we can. 4. Dropped radha to 250mg tid due to her weight macx radha 784mg. She said capsule little hard to swallow and this may be easier.  Duration of the visit was 30 min with 10 min of chart prep reviewing data and information that was available for the visit and setting up in ecw and then an additional 20 min from 918 am to 938 am by clock as [facility] clock off as pt had poor internet for the [facility] telemed visit today with time spent reviewing said material and their clinical correlates and then with this information being used to formulate a treatment plan.

## 2023-12-21 ENCOUNTER — TELEPHONE ENCOUNTER (OUTPATIENT)
Dept: URBAN - METROPOLITAN AREA CLINIC 86 | Facility: CLINIC | Age: 33
End: 2023-12-21

## 2023-12-21 LAB
A/G RATIO: 1.9
ALBUMIN: 4.3
ALKALINE PHOSPHATASE: 105
ALT (SGPT): 14
AST (SGOT): 21
BASO (ABSOLUTE): 0
BASOS: 1
BILIRUBIN, TOTAL: 0.4
BUN/CREATININE RATIO: 10
BUN: 7
CALCIUM: 9.1
CARBON DIOXIDE, TOTAL: 23
CHLORIDE: 103
CREATININE: 0.71
EGFR: 114
EOS (ABSOLUTE): 0.1
EOS: 2
GLOBULIN, TOTAL: 2.3
GLUCOSE: 85
HEMATOCRIT: 36.7
HEMATOLOGY COMMENTS:: (no result)
HEMOGLOBIN: 12.1
IMMATURE CELLS: (no result)
IMMATURE GRANS (ABS): 0
IMMATURE GRANULOCYTES: 0
LIPASE: 84
LYMPHS (ABSOLUTE): 0.9
LYMPHS: 22
MCH: 30.5
MCHC: 33
MCV: 92
MONOCYTES(ABSOLUTE): 0.4
MONOCYTES: 9
NEUTROPHILS (ABSOLUTE): 2.8
NEUTROPHILS: 66
NRBC: (no result)
PLATELETS: 201
POTASSIUM: 4.1
PROTEIN, TOTAL: 6.6
RBC: 3.97
RDW: 13
SODIUM: 139
WBC: 4.2

## 2023-12-21 NOTE — HPI-TODAY'S VISIT:
Dear Paty Combs,  December 20 labs show an isolated lipase elevation of 84 which is above the normal range of 14-72 and you previously had been 29 back in October and 38 in November.  Have you been ill recently as that can sometimes cause this particular if he had a gastrointestinal process?  Sugar was 85 BUN is 7 creatinine 0.71 sodium 139 potassium 4.1 albumin 4.3 bilirubin 0.4 alk phos 105 AST 21 ALT 14 send no liver labs really to suggest any issues going on. WBC 4.2 hemoglobin 12.1 platelet count 201 MCV 92 with neutrophils normal at 2.8 lymphocytes 0.9. Called pt to see what may have happened of late. She says 2 -3 weeks ago had upper respiratory issues and did not see anyone and resolved. Will plan to redo the labs in 2 weeks and see how doing. Dr Isbell

## 2024-01-05 ENCOUNTER — LAB OUTSIDE AN ENCOUNTER (OUTPATIENT)
Dept: URBAN - METROPOLITAN AREA TELEHEALTH 2 | Facility: TELEHEALTH | Age: 34
End: 2024-01-05

## 2024-01-08 ENCOUNTER — LAB OUTSIDE AN ENCOUNTER (OUTPATIENT)
Dept: URBAN - METROPOLITAN AREA CLINIC 86 | Facility: CLINIC | Age: 34
End: 2024-01-08

## 2024-01-20 ENCOUNTER — TELEPHONE ENCOUNTER (OUTPATIENT)
Dept: URBAN - METROPOLITAN AREA CLINIC 86 | Facility: CLINIC | Age: 34
End: 2024-01-20

## 2024-01-20 LAB
A/G RATIO: 1.4
ALBUMIN: 4
ALKALINE PHOSPHATASE: 115
ALT (SGPT): 16
AST (SGOT): 27
BASO (ABSOLUTE): 0
BASOS: 0
BILIRUBIN, TOTAL: 0.4
BUN/CREATININE RATIO: 14
BUN: 9
CALCIUM: 9
CARBON DIOXIDE, TOTAL: 23
CHLORIDE: 105
CREATININE: 0.63
EGFR: 119
EOS (ABSOLUTE): 0.1
EOS: 2
GLOBULIN, TOTAL: 2.9
GLUCOSE: 108
HEMATOCRIT: 34.9
HEMATOLOGY COMMENTS:: (no result)
HEMOGLOBIN: 11.9
IMMATURE CELLS: (no result)
IMMATURE GRANS (ABS): 0
IMMATURE GRANULOCYTES: 0
LIPASE: 38
LYMPHS (ABSOLUTE): 1.1
LYMPHS: 30
MCH: 31
MCHC: 34.1
MCV: 91
MONOCYTES(ABSOLUTE): 0.3
MONOCYTES: 8
NEUTROPHILS (ABSOLUTE): 2.2
NEUTROPHILS: 60
NRBC: (no result)
PLATELETS: 198
POTASSIUM: 3.9
PROTEIN, TOTAL: 6.9
RBC: 3.84
RDW: 12.6
SODIUM: 141
WBC: 3.7

## 2024-01-20 NOTE — HPI-TODAY'S VISIT:
Dear Paty Combs,  Jan 19 lipase 38 and down from 84 spike in dec and close to prior 29. Glucose 108 elevated and bun 9 and cr 0.63 and na 141 and k 3.9 and cl 105 and co2 23 and ca 9.0 and alb 4.0 and tb 0.4 and alk 115 and ast 27 and alt 16. Prior alk 105 and ast 21 and alt 14. Ideal alt less than 25. Wbc 3.7 and hg 11.9 and hct 34.9 and platelets 198. mcv 91.Neutrophils 2.2 and lymphs 1.1. Good to see labs better. Dr Isbell

## 2024-02-01 ENCOUNTER — LAB (OUTPATIENT)
Dept: URBAN - METROPOLITAN AREA TELEHEALTH 2 | Facility: TELEHEALTH | Age: 34
End: 2024-02-01

## 2024-02-08 ENCOUNTER — LAB (OUTPATIENT)
Dept: URBAN - METROPOLITAN AREA TELEHEALTH 2 | Facility: TELEHEALTH | Age: 34
End: 2024-02-08

## 2024-02-13 ENCOUNTER — LAB (OUTPATIENT)
Dept: URBAN - METROPOLITAN AREA CLINIC 86 | Facility: CLINIC | Age: 34
End: 2024-02-13

## 2024-02-13 ENCOUNTER — TELEP (OUTPATIENT)
Dept: URBAN - METROPOLITAN AREA CLINIC 86 | Facility: CLINIC | Age: 34
End: 2024-02-13
Payer: COMMERCIAL

## 2024-02-13 VITALS — WEIGHT: 105 LBS | BODY MASS INDEX: 19.32 KG/M2 | HEIGHT: 62 IN

## 2024-02-13 DIAGNOSIS — K74.02 HEPATIC FIBROSIS, ADVANCED FIBROSIS: ICD-10-CM

## 2024-02-13 DIAGNOSIS — Z79.899 HIGH RISK MEDICATION USE: ICD-10-CM

## 2024-02-13 DIAGNOSIS — Z71.89 VACCINE COUNSELING: ICD-10-CM

## 2024-02-13 DIAGNOSIS — R76.9 ABNORMAL IMMUNOLOGICAL FINDING IN SERUM: ICD-10-CM

## 2024-02-13 DIAGNOSIS — Z98.890 HISTORY OF LIVER BIOPSY: ICD-10-CM

## 2024-02-13 DIAGNOSIS — R74.8 ABNORMAL LIVER ENZYMES: ICD-10-CM

## 2024-02-13 DIAGNOSIS — K75.4 AUTOIMMUNE HEPATITIS: ICD-10-CM

## 2024-02-13 LAB
A/G RATIO: 1.8
ALBUMIN: 4.4
ALKALINE PHOSPHATASE: 118
ALT (SGPT): 20
AST (SGOT): 41
BASO (ABSOLUTE): 0
BASOS: 0
BILIRUBIN, TOTAL: 0.3
BUN/CREATININE RATIO: 19
BUN: 13
CALCIUM: 9
CARBON DIOXIDE, TOTAL: 21
CHLORIDE: 101
CREATININE: 0.7
EGFR: 116
EOS (ABSOLUTE): 0
EOS: 1
GLOBULIN, TOTAL: 2.5
GLUCOSE: 93
HEMATOCRIT: 35.5
HEMATOLOGY COMMENTS:: (no result)
HEMOGLOBIN: 11.9
IMMATURE CELLS: (no result)
IMMATURE GRANS (ABS): 0
IMMATURE GRANULOCYTES: 0
LIPASE: 21
LYMPHS (ABSOLUTE): 0.3
LYMPHS: 7
MCH: 30.4
MCHC: 33.5
MCV: 91
MONOCYTES(ABSOLUTE): 0.5
MONOCYTES: 12
NEUTROPHILS (ABSOLUTE): 3.3
NEUTROPHILS: 80
NRBC: (no result)
PLATELETS: 206
POTASSIUM: 3.6
PROTEIN, TOTAL: 6.9
RBC: 3.91
RDW: 12.8
SODIUM: 136
WBC: 4.2

## 2024-02-13 PROCEDURE — 99214 OFFICE O/P EST MOD 30 MIN: CPT

## 2024-02-13 RX ORDER — PREDNISONE 1 MG/1
1 TABLET WITH FOOD ON MON AND WED AND FRI TABLET ORAL
Refills: 1 | Status: ACTIVE | COMMUNITY

## 2024-02-13 RX ORDER — PREDNISONE 1 MG/1
1 TABLET WITH FOOD ON MON AND WED AND FRIDAY TABLET ORAL EVERY OTHER DAY
Qty: 36 | Refills: 1

## 2024-02-13 RX ORDER — URSODIOL 250 MG/1
1 TABLET WITH FOOD TABLET ORAL
Qty: 270 | Refills: 1 | OUTPATIENT

## 2024-02-13 RX ORDER — AZATHIOPRINE 50 MG/1
TAKE 1 TABLET BY MOUTH DAILY TABLET ORAL
Qty: 90 TABLET | Refills: 1

## 2024-02-13 RX ORDER — FERROUS SULFATE 325(65) MG
1 TABLET TABLET ORAL TIW
Status: ACTIVE | COMMUNITY

## 2024-02-13 RX ORDER — URSODIOL 250 MG/1
1 TABLET WITH FOOD TABLET ORAL
Qty: 270 | Refills: 1 | Status: ACTIVE | COMMUNITY

## 2024-02-13 RX ORDER — VALACYCLOVIR 500 MG/1
1 TABLET TABLET, FILM COATED ORAL BID PRN
Status: ACTIVE | COMMUNITY

## 2024-02-13 RX ORDER — CALCIUM CARBONATE/VITAMIN D3 500MG-5MCG
1 TABLET WITH MEALS TABLET ORAL
Status: ACTIVE | COMMUNITY

## 2024-02-13 RX ORDER — AZATHIOPRINE 50 MG/1
TAKE 1 TABLET BY MOUTH DAILY TABLET ORAL
Qty: 90 TABLET | Refills: 1 | Status: ACTIVE | COMMUNITY

## 2024-02-13 RX ORDER — CHOLECALCIFEROL (VITAMIN D3) 50 MCG
1 TABLET CAPSULE ORAL ONCE A DAY
Status: ACTIVE | COMMUNITY

## 2024-02-13 NOTE — HPI-TODAY'S VISIT:
Pt is a 34 year old female who presents post Dec 2023 visit for follow up of AIH post pregnancy and she delivered 2022.  2024 lipase 38 and down from 84 spike in dec and close to prior 29. Glucose 108 elevated and bun 9 and cr 0.63 and na 141 and k 3.9 and cl 105 and co2 23 and ca 9.0 and alb 4.0 and tb 0.4 and alk 115 and ast 27 and alt 16. Prior alk 105 and ast 21 and alt 14. Ideal alt less than 25. Wbc 3.7 and hg 11.9 and hct 34.9 and platelets 198. mcv 91.Neutrophils 2.2 and lymphs 1.1.   2023 labs show an isolated lipase elevation of 84 which is above the normal range of 14-72 and you previously had been 29 back in October and 38 in November. Have you been ill recently as that can sometimes cause this particular if he had a gastrointestinal process? Sugar was 85 BUN is 7 creatinine 0.71 sodium 139 potassium 4.1 albumin 4.3 bilirubin 0.4 alk phos 105 AST 21 ALT 14 send no liver labs really to suggest any issues going on. WBC 4.2 hemoglobin 12.1 platelet count 201 MCV 92 with neutrophils normal at 2.8 lymphocytes 0.9. Called pt to see what may have happened of late. She says 2 -3 weeks ago had upper respiratory issues and did not see anyone and resolved. Will plan to redo the labs in 2 weeks and see how doing.    labs showed WBC 4.6 hemoglobin 11.4 platelet count 198 MCV 93 with normal neutrophils and lymphocytes. Glucose 79 BUN of 12 creatinine 0.69 sodium 142 and potassium 3.7 and these are normal. Chloride was slightly up at 107 which is nonspecific. Calcium 9.0 albumin 4.1 bilirubin 0.6 and alk phos normal now at 114 which is good to see. AST of 26 and ALT of 20 with prior AST of 24 and ALT of 10 so approximately the same in the AST but definitely a little higher on the ALT. Ideal  ALT less than 25. Lipase was normal slightly higher at 38.  She says had a little cold at the time.   labs showed lipase 29 glucose 92 BUN of 9 creatinine 0.68 sodium 139 potassium 3.5 calcium 9.3 bilirubin 0.6 alk phos 124 which is slightly higher than before when it was 122.  Normal is from 44 up to 121.AST 24 and ALT 10. Ideal alt is less than 25.When I see the alkaline phosphatase remains only slightly up such as this, I wonder about your vitamin D level.  Please check with primary provider as that may be a little low and that could potentially be explaining the alkaline phosphatase being slightly up due to increased bone turnover from. Could also be due to slight ursodiol dose correction for weight last time.WBC 4.8, hemoglobin 12.1 platelet count 187 MCV 90 with normal neutrophils 3.4 and lymphocytes 1.0.Per notes last change was ursodiol dose slight adjustment to 250mg three times a day. Given increase alk phos would redo labs again previsit in Dec and see if the labs continue to settle   She is currently ursodiol 750mg a day and imuran 50mg a day and prednisone 1mg TIW.  August 3 MRI shows lower thorax with limited images to the lung bases being unremarkable. Liver with no significant fat or iron deposition.  Mild heterogeneity seen of the liver.  Liver appeared to be mildly small in size. There were no definite morphologic changes of advanced cirrhosis.  There were no suspicious liver lesions. The gallbladder was present and contains some sludge or concentrated bile within it.   No bile duct dilation seen. Spleen was normal at 8.9 cm. No dilation seen in the main pancreatic duct and no suspicious lesions. Adrenal glands normal. Kidneys showed no hydronephrosis or focal renal lesion. There was no bowel obstruction sign seen and incidentally noted gastric diverticulum is seen along the posterior aspect of the gastric fundus measuring 2.2 x 1.9 cm. She has not done an egd and did see Dr Iniguez re this. No recall of ulcer. Mildly prominent lymph nodes were seen around the liver.  They were felt to be nonspecific possibly reactive. Liver vessels were patent.  I do suspect however there was some reflux of contrast material into the left gonadal vein. Small volume fluid was seen in the pelvis which I thought was possibly physiologic which is there with a live possibly this was near the time of your cycle. In summary, mild nonspecific heterogeneous changes seen of the liver with no definite changes of cirrhosis.  No splenomegaly seen and no ascites.  Liver vessels patent.  This is good to see and it confirms that the liver probably does have some scarring changes because of your liver disease history but the good news is that there are no signs of any advanced cirrhosis which was the concern.   labs show lipase normal at 31, glucose normal at 87 BUN of 11 creatinine 0.73 sodium 140 potassium 4.0 calcium 9.3 albumin 4.5 bilirubin 0.3 which is down from 0.5. Good to see the alk phos be almost normal now at 122 from previous 128 and prior 173.  Very happy to see that it is almost normal.  AST 27 and ALT 16 which are doing well.  Ideal ALT less than 25. WBC 4.8 hemoglobin 12.3 platelet count 156 MCV 92 with normal neutrophils and lymphocytes. At our last contact on , it states that you are down to 1 mg a day and so that is good to see that this remains stable.  it is very important to taper these very low doses of steroids slowly as we are doing.  Please do the monitoring labs again as we discussed before the next visit in September.   labs show lipase normal at 18, sugar a little elevated at 114.  Had you been fasting that day?  Please share with local providers. BUN of 7 creatinine 0.71 sodium 139 potassium 3.8 albumin 4.4 bili 0.5 and alk phos almost normal now at 128.  Normal is less than 121.  Previously in March it had been 173 for comparison.  AST 25 and ALT 20 with ideal ALT less than 25. WBC 5 hemoglobin 12.3 plate count 208 MCV 92 and normal neutrophils and lymphocytes.  She is 1mg a day of steroid q other day and on that 2023.  Regarding today's labs, the note from [facility] mentions how you had cut back steroids down to 1 mg a day now. If so, these labs would suggest that you are tolerating this well and we need to continue to watch and slowly taper this so that your body can acclimate to the drop.   2023 wbc 4.4 hg 12.1 platelet 225 and mcv 93 and glu 84 and bun 9 and cr 0.84 and na 141 and k 4.0 and cl 104 and co2 24 and alb 4.2 tb 0.4 and db 0.11 and alk 130 and ast 25 and alt 17. lipase 26.  Plan is to drop to 1mg pred and do the labs in 3 and 6 and 12 weeks.  [facility] ultrasound shows liver coarsened in echotexture and nodular contour suggesting cirrhosis.  No lesions were seen.  Important to do this every 6 months for surveillance. Bile ducts not dilated and common bile duct 1.7 mm. Gallbladder normal.  Skinner sign negative. Pancreas normal were seen. Right kidney 8 point centimeters with no hydronephrosis.  Spleen normal at 9.6 cm. Liver vessels were patent. No ascites seen. In summary they felt she had a cirrhotic liver morphology but no suspicious lesions. Very important to keep your labs as normal as possible for the best chance for this to heal over time. We will plan to redo the scan again in 6 months. Discussed the us with the pt. Explained the issues to her. She does need to look to get the egd done to look for varices.  She did not have covid 19 around the time of the u,s.  Right now meds radha 300mg tid, aza 50mg po qd, 1mg po qd.  Vit d3 and 2000 iu po qd calcium with vit d 3 x week.  3-9-2023 wbc 3.6 hg 12.9 and plat 224 and mcv 91 and neutrophils 2.3 and lymphs 0.9.  glu 91 and bun 9 and cr 0.78 an na 141 and k 3.7 and cl 105 and co2 26 and alb 4.3 and tb 0.4 and alk 173 and  ast 28 and alt 20.  Prior alk 163 so that went up some. Lpase 22 and inr 1.09.  She is not planning another.  2022 ultrasound shows liver echogenicity appears increased and with no focal lesions. Common bile duct was normal at 2 mm. Gallbladder appeared normal. No significant ascites was seen. Right kidney was 9.2 cm and no hydronephrosis was seen. Liver vessels were patent. Spleen was normal in size. Overall the liver was echogenic in keeping with possible chronic liver disease but that also could be related to fatty liver. Need to look at your lab patterns. Patent vessels were seen. Long-term we can talk about trying to get an MRI at some point electively to look deeper at this liver issue and sort out fat versus fibrosis better.  She stopped breast feeding in 2023 and went for 11 m.  2022 : lipase 22 and normal. Glu 94 and bun 8 and cr 0.60 and na 139 and k 3.9 and cl 103 and co2 23 and ca 9.3 and alb 4.3 and tb 0.3 and alk 147 and down from 190 on sept 15. Ast 26 and alt 19. Prior ast 35 and alt 27. Wbc 5.5 and hg 11.7 and hct 35.8 and plat 206. Mcv 88 and neutrophils 4.2 and lymphs 0.8.    labs showed liver fraction 74%, bone fraction 23% and intestinal fraction 3% so this confirms that the alkaline phosphatase is mainly coming from liver which is the normal pattern. Albumin 4.4 bilirubin 0.4, direct bilirubin 0.6 alkaline phosphatase 165 up from 163 back in  for comparison.  AST 29 ALT 20 and previous AST 23 and 16 back on .  Sept 15: glu 73 and bun 11 and cr 0.76 and na 142 and k 4.4 and cl 103 and co2 22 and ca 9.6 and total protein 8.0 and alb 4.4 and tb 0.4 and alk 190 (up from 174 and prior 142.) Ast 35 and alt 27. So these are higher.  July limited hepatitis alk 163 and ast 23 and alt 16 they were lower.  Wbc 4.1 and hg 12.5 and hct 40.0 and mcv 88 and platelets 225.  MCHC or mean corpuscular hemoglobin concentration 31.3 little low but up from 29.8 prior. Please share with local provider. Neutrophils 2.8 and lymphs 0.9.   Per notes, on aza 50mg po qd and prednisone 2mg po qd and ursodiol 300mg po tid (increased in July).  She stopped the fenugreek like medicine (crackers took) a few weeks ago for 2 weeks month ago. [employer] Lactation Cheddar crisps: have turmeric in them.  Prior she had started iron pills and taking them less so.          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   labs show alb 4.1 and tb 0.3 and direct bilirubin less than 0.1 and elevated alk phos of 163 and ast 23 and alt 16.  [facility] labs alk 174 and ast 34 and alt 31.   ultrasound shows partially visualized pancreas unremarkable. The liver parenchyma however it did appear to be coarsened but with no mass. Gallbladder thin-walled and no stones. Common bile duct normal at 2 mm.  Right kidney 8.9 cm with no hydronephrosis.  Spleen normal at 9.6 cm. Liver vessels patent. They mention that the coarsening could represent early chronic liver disease.  On your last year ultrasound the liver had some coarsening and even mild surface nodularity seen, so even though it does not appear at first glance to be better, the description that they used is milder now. 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   labs show white blood cell count 4.1 hemoglobin 12.1 platelet count 219 MCV 89 neutrophils 1.6 and lymphocytes 1.9.  These are all in normal range. Glucose 166 still up and down from 361 last time.  BUN of 10 creatinine 0.73 which is now much better than previous 1.45 cr of April.  Please share with primary provider. Sodium 142 potassium 4.0 albumin 4.2.  Total bilirubin 0.4 alkaline phosphatase 68 and AST normal at 33 down from 55.  ALT 28 normal down from 49.  Ideal ALT is less than 25. Look forward to seeing you soon.  May 2022 Did TH with pt. due to itching will reduce dose of radha to bid dosing. max dose around 900mg. continue pred 2mg for now has been on this since 2022.  pt had baby girl on 22,   Dr. Wakefield worked with her in prior pregnancy.  She stayed on imuran during pregnancy. At start prednisone 10mg 8/10/21 and radha bid and weaned as per ob.     labs show normal lipase of 28 previously 31.  Glucose still slightly up at 106 previously 109 BUN of 11 creatinine 0.68 sodium 136 potassium 4.4 calcium 9.5 albumin 4.6 bilirubin 0.4 alkaline phosphatase 90 AST 35 ALT 25.  Prior AST 28 and ALT 17 so slightly higher.  Prior alkaline phosphatase 88.   White blood cell count 8.4 hemoglobin trending better at 10.9 but still low but better than prior10.8 and prior 10.3.  MCV normal 86 platelet count 302 up from 233.  Neutrophils normal at 7 lymphocytes 0.8.   2021 u/s: ultrasound shows the partially visualized pancreas to be unremarkable.  Liver has a coarsened echotexture to its parenchyma and with mild surface nodularity.  No focal mass seen. Gallbladder was thin-walled and the common bile duct was normal at 2 mm.  Right kidney measured 8.5 cm with no hydronephrosis.  Spleen normal 10 cm.  No ascites seen.  Liver vessels were patent. They overall felt that your liver parenchyma appeared to have cirrhotic features and with no focal mass.  Plan: 1.  Pt getting over uri and labs trending better.  2.  Labs yesterday done and pending. 3.   Pt will do labs in 1m and 3m. 4.   Pt will see us in  telemed. 5.   Pt will do u.s now.  Duration of the visit was 30 min with 10 min of chart prep reviewing data and information that was available for the visit and setting up in ecw and then an additional 20 min  by [facility] telemed visit with time spent reviewing said material and their clinical correlates and then with this information being used to formulate a treatment plan.

## 2024-03-11 ENCOUNTER — LAB (OUTPATIENT)
Dept: URBAN - METROPOLITAN AREA CLINIC 86 | Facility: CLINIC | Age: 34
End: 2024-03-11

## 2024-03-13 LAB
ALBUMIN: 4.1
ALKALINE PHOSPHATASE: 101
ALT (SGPT): 12
AST (SGOT): 22
BILIRUBIN, DIRECT: 0.18
BILIRUBIN, TOTAL: 0.7
LIPASE: 16
PROTEIN, TOTAL: 7

## 2024-03-25 ENCOUNTER — LAB (OUTPATIENT)
Dept: URBAN - METROPOLITAN AREA CLINIC 86 | Facility: CLINIC | Age: 34
End: 2024-03-25

## 2024-04-17 LAB
ALBUMIN: 4.5
ALKALINE PHOSPHATASE: 108
ALT (SGPT): 12
AST (SGOT): 22
BILIRUBIN, DIRECT: 0.1
BILIRUBIN, TOTAL: 0.3
PROTEIN, TOTAL: 7.4

## 2024-04-22 ENCOUNTER — LAB (OUTPATIENT)
Dept: URBAN - METROPOLITAN AREA CLINIC 86 | Facility: CLINIC | Age: 34
End: 2024-04-22

## 2024-05-01 ENCOUNTER — LAB OUTSIDE AN ENCOUNTER (OUTPATIENT)
Dept: URBAN - METROPOLITAN AREA CLINIC 86 | Facility: CLINIC | Age: 34
End: 2024-05-01

## 2024-05-10 ENCOUNTER — LAB OUTSIDE AN ENCOUNTER (OUTPATIENT)
Dept: URBAN - METROPOLITAN AREA CLINIC 86 | Facility: CLINIC | Age: 34
End: 2024-05-10

## 2024-05-11 ENCOUNTER — TELEPHONE ENCOUNTER (OUTPATIENT)
Dept: URBAN - METROPOLITAN AREA CLINIC 86 | Facility: CLINIC | Age: 34
End: 2024-05-11

## 2024-05-11 LAB
ALBUMIN: 3.9
ALKALINE PHOSPHATASE: 95
ALT (SGPT): 9
AST (SGOT): 20
BASO (ABSOLUTE): 0
BASOS: 1
BILIRUBIN, DIRECT: 0.12
BILIRUBIN, TOTAL: 0.3
BUN/CREATININE RATIO: 18
BUN: 12
CARBON DIOXIDE, TOTAL: 23
CHLORIDE: 109
CREATININE: 0.67
EGFR: 117
EOS (ABSOLUTE): 0.1
EOS: 2
GLUCOSE: 80
HEMATOCRIT: 36.1
HEMATOLOGY COMMENTS:: (no result)
HEMOGLOBIN: 11.9
IMMATURE CELLS: (no result)
IMMATURE GRANS (ABS): 0
IMMATURE GRANULOCYTES: 0
LIPASE: 24
LYMPHS (ABSOLUTE): 1
LYMPHS: 30
MCH: 31.4
MCHC: 33
MCV: 95
MONOCYTES(ABSOLUTE): 0.3
MONOCYTES: 9
NEUTROPHILS (ABSOLUTE): 1.9
NEUTROPHILS: 58
NRBC: (no result)
PLATELETS: 207
POTASSIUM: 4.1
PROTEIN, TOTAL: 6.7
RBC: 3.79
RDW: 12.7
SODIUM: 143
WBC: 3.2

## 2024-05-13 ENCOUNTER — LAB OUTSIDE AN ENCOUNTER (OUTPATIENT)
Dept: URBAN - METROPOLITAN AREA TELEHEALTH 2 | Facility: TELEHEALTH | Age: 34
End: 2024-05-13

## 2024-05-13 ENCOUNTER — OFFICE VISIT (OUTPATIENT)
Dept: URBAN - METROPOLITAN AREA TELEHEALTH 2 | Facility: TELEHEALTH | Age: 34
End: 2024-05-13
Payer: COMMERCIAL

## 2024-05-13 ENCOUNTER — DASHBOARD ENCOUNTERS (OUTPATIENT)
Age: 34
End: 2024-05-13

## 2024-05-13 VITALS — BODY MASS INDEX: 19.32 KG/M2 | WEIGHT: 105 LBS | HEIGHT: 62 IN

## 2024-05-13 DIAGNOSIS — K74.02 HEPATIC FIBROSIS, ADVANCED FIBROSIS: ICD-10-CM

## 2024-05-13 DIAGNOSIS — K75.4 AUTOIMMUNE HEPATITIS: ICD-10-CM

## 2024-05-13 DIAGNOSIS — R74.8 ABNORMAL LIVER ENZYMES: ICD-10-CM

## 2024-05-13 DIAGNOSIS — R76.9 ABNORMAL IMMUNOLOGICAL FINDING IN SERUM: ICD-10-CM

## 2024-05-13 PROCEDURE — 99215 OFFICE O/P EST HI 40 MIN: CPT

## 2024-05-13 RX ORDER — PREDNISONE 1 MG/1
1 TABLET WITH FOOD ON MON AND THURSDAY TABLET ORAL
Refills: 1 | Status: ACTIVE | COMMUNITY

## 2024-05-13 RX ORDER — FERROUS SULFATE 325(65) MG
1 TABLET TABLET ORAL TIW
Status: ACTIVE | COMMUNITY

## 2024-05-13 RX ORDER — CALCIUM CARBONATE/VITAMIN D3 500MG-5MCG
1 TABLET WITH MEALS TABLET ORAL
Status: ACTIVE | COMMUNITY

## 2024-05-13 RX ORDER — AZATHIOPRINE 50 MG/1
TAKE 1 TABLET BY MOUTH DAILY TABLET ORAL
Qty: 90 TABLET | Refills: 1 | Status: ACTIVE | COMMUNITY

## 2024-05-13 RX ORDER — AZATHIOPRINE 50 MG/1
TAKE 1 TABLET BY MOUTH DAILY TABLET ORAL
Qty: 90 TABLET | Refills: 1

## 2024-05-13 RX ORDER — VALACYCLOVIR 500 MG/1
1 TABLET TABLET, FILM COATED ORAL BID PRN
Status: ACTIVE | COMMUNITY

## 2024-05-13 RX ORDER — CHOLECALCIFEROL (VITAMIN D3) 50 MCG
1 TABLET CAPSULE ORAL ONCE A DAY
Status: ACTIVE | COMMUNITY

## 2024-05-13 RX ORDER — URSODIOL 250 MG/1
1 TABLET WITH FOOD TABLET ORAL
Qty: 270 | Refills: 1 | OUTPATIENT

## 2024-05-13 RX ORDER — URSODIOL 250 MG/1
1 TABLET WITH FOOD TABLET ORAL
Qty: 270 | Refills: 1 | Status: ACTIVE | COMMUNITY

## 2024-05-13 RX ORDER — PREDNISONE 1 MG/1
1 TABLET WITH FOOD ON MON AND THURSDAY TABLET ORAL
Qty: 24 | Refills: 1
End: 2024-11-09

## 2024-05-25 ENCOUNTER — WEB ENCOUNTER (OUTPATIENT)
Dept: URBAN - METROPOLITAN AREA CLINIC 86 | Facility: CLINIC | Age: 34
End: 2024-05-25

## 2024-05-25 ENCOUNTER — TELEPHONE ENCOUNTER (OUTPATIENT)
Dept: URBAN - METROPOLITAN AREA CLINIC 86 | Facility: CLINIC | Age: 34
End: 2024-05-25

## 2024-05-25 LAB
A/G RATIO: 1.3
ALBUMIN: 4.1
ALKALINE PHOSPHATASE: 126
ALT (SGPT): 17
AST (SGOT): 34
BASO (ABSOLUTE): 0
BASOS: 1
BILIRUBIN, TOTAL: 0.4
BUN/CREATININE RATIO: 16
BUN: 11
CALCIUM: 9.3
CARBON DIOXIDE, TOTAL: 24
CHLORIDE: 104
CREATININE: 0.7
EGFR: 116
EOS (ABSOLUTE): 0.1
EOS: 1
GLOBULIN, TOTAL: 3.2
GLUCOSE: 94
HEMATOCRIT: 38
HEMATOLOGY COMMENTS:: (no result)
HEMOGLOBIN: 12.6
IMMATURE CELLS: (no result)
IMMATURE GRANS (ABS): 0
IMMATURE GRANULOCYTES: 1
LYMPHS (ABSOLUTE): 0.7
LYMPHS: 14
MCH: 31.1
MCHC: 33.2
MCV: 94
MONOCYTES(ABSOLUTE): 0.5
MONOCYTES: 9
NEUTROPHILS (ABSOLUTE): 4.1
NEUTROPHILS: 74
NRBC: (no result)
PLATELETS: 184
POTASSIUM: 4.2
PROTEIN, TOTAL: 7.3
RBC: 4.05
RDW: 12.1
SODIUM: 140
WBC: 5.4

## 2024-06-04 ENCOUNTER — WEB ENCOUNTER (OUTPATIENT)
Dept: URBAN - METROPOLITAN AREA CLINIC 92 | Facility: CLINIC | Age: 34
End: 2024-06-04

## 2024-06-08 ENCOUNTER — LAB OUTSIDE AN ENCOUNTER (OUTPATIENT)
Dept: URBAN - METROPOLITAN AREA CLINIC 86 | Facility: CLINIC | Age: 34
End: 2024-06-08

## 2024-06-12 ENCOUNTER — TELEPHONE ENCOUNTER (OUTPATIENT)
Dept: URBAN - METROPOLITAN AREA CLINIC 86 | Facility: CLINIC | Age: 34
End: 2024-06-12

## 2024-06-12 LAB
ALBUMIN: 4.1
ALKALINE PHOSPHATASE: 118
ALT (SGPT): 10
AST (SGOT): 21
BILIRUBIN, DIRECT: 0.11
BILIRUBIN, TOTAL: 0.3
PROTEIN, TOTAL: 7.2

## 2024-06-12 NOTE — HPI-TODAY'S VISIT:
Dear Paty Combs,   June 11 labs show total protein 7.2 and alb 4.1 and tb 0.3 and direct bili 0.11 and alk 118 and ast 21 and alt 10. May 10 alk 95 and ast 20 and alt 9. Good to see labs stable. Dr Isbell

## 2024-06-22 ENCOUNTER — LAB OUTSIDE AN ENCOUNTER (OUTPATIENT)
Dept: URBAN - METROPOLITAN AREA CLINIC 86 | Facility: CLINIC | Age: 34
End: 2024-06-22

## 2024-06-24 ENCOUNTER — LAB OUTSIDE AN ENCOUNTER (OUTPATIENT)
Dept: URBAN - METROPOLITAN AREA TELEHEALTH 2 | Facility: TELEHEALTH | Age: 34
End: 2024-06-24

## 2024-06-27 ENCOUNTER — ERX REFILL RESPONSE (OUTPATIENT)
Dept: URBAN - METROPOLITAN AREA CLINIC 86 | Facility: CLINIC | Age: 34
End: 2024-06-27

## 2024-06-27 RX ORDER — URSODIOL 250 MG/1
1 TABLET WITH FOOD TABLET ORAL
Qty: 270 | Refills: 0 | OUTPATIENT

## 2024-06-27 RX ORDER — URSODIOL 250 MG/1
1 TABLET WITH FOOD TABLET ORAL
Qty: 270 | Refills: 1 | OUTPATIENT

## 2024-08-01 ENCOUNTER — LAB OUTSIDE AN ENCOUNTER (OUTPATIENT)
Dept: URBAN - METROPOLITAN AREA TELEHEALTH 2 | Facility: TELEHEALTH | Age: 34
End: 2024-08-01

## 2024-08-08 ENCOUNTER — TELEPHONE ENCOUNTER (OUTPATIENT)
Dept: URBAN - METROPOLITAN AREA CLINIC 86 | Facility: CLINIC | Age: 34
End: 2024-08-08

## 2024-08-08 NOTE — HPI-TODAY'S VISIT:
Dear Paty Combs, August 7 labs show albumin normal at 4.5, bilirubin normal at 0.4 and direct bilirubin 0.11 stable.   Your alk phos however was up at 149 and had been normal at 111 back in June.  AST was 28 and ALT of 19 and previously AST 21 and ALT 10.   Per notes, you have been on prednisone 1 mg twice a week ursodiol 750 mg a day in the MRI and 50 mg a day and not changed as of last visit.   It is possible that the alk phos could go up from a bone injury but it also could be related due to your overlap basis or a new medicine?   Called pt and not ill. She has not had green tea but was having peach tea and it may have black and green tea and she will stop and redo the labs in 1-2 weeks.   Dr Isbell

## 2024-08-15 ENCOUNTER — OFFICE VISIT (OUTPATIENT)
Dept: URBAN - METROPOLITAN AREA CLINIC 86 | Facility: CLINIC | Age: 34
End: 2024-08-15
Payer: COMMERCIAL

## 2024-08-15 VITALS
TEMPERATURE: 97 F | WEIGHT: 105 LBS | HEART RATE: 102 BPM | BODY MASS INDEX: 19.32 KG/M2 | HEIGHT: 62 IN | SYSTOLIC BLOOD PRESSURE: 115 MMHG | DIASTOLIC BLOOD PRESSURE: 79 MMHG

## 2024-08-15 DIAGNOSIS — R74.8 ABNORMAL LIVER ENZYMES: ICD-10-CM

## 2024-08-15 DIAGNOSIS — R76.9 ABNORMAL IMMUNOLOGICAL FINDING IN SERUM: ICD-10-CM

## 2024-08-15 DIAGNOSIS — K75.4 AUTOIMMUNE HEPATITIS: ICD-10-CM

## 2024-08-15 DIAGNOSIS — K74.02 HEPATIC FIBROSIS, ADVANCED FIBROSIS: ICD-10-CM

## 2024-08-15 DIAGNOSIS — Z79.899 HIGH RISK MEDICATION USE: ICD-10-CM

## 2024-08-15 DIAGNOSIS — Z98.890 HISTORY OF LIVER BIOPSY: ICD-10-CM

## 2024-08-15 DIAGNOSIS — Z71.89 VACCINE COUNSELING: ICD-10-CM

## 2024-08-15 PROCEDURE — 99215 OFFICE O/P EST HI 40 MIN: CPT

## 2024-08-15 RX ORDER — AZATHIOPRINE 50 MG/1
TAKE 1 TABLET BY MOUTH DAILY TABLET ORAL
Qty: 90 TABLET | Refills: 1 | Status: ACTIVE | COMMUNITY

## 2024-08-15 RX ORDER — FERROUS SULFATE 325(65) MG
1 TABLET TABLET ORAL TIW
Status: ACTIVE | COMMUNITY

## 2024-08-15 RX ORDER — URSODIOL 250 MG/1
1 TABLET WITH FOOD TABLET ORAL
Qty: 270 | Refills: 1 | OUTPATIENT

## 2024-08-15 RX ORDER — CALCIUM CARBONATE/VITAMIN D3 500MG-5MCG
1 TABLET WITH MEALS TABLET ORAL
Status: ACTIVE | COMMUNITY

## 2024-08-15 RX ORDER — PREDNISONE 1 MG/1
1 TABLET WITH FOOD ON MON AND THURSDAY TABLET ORAL
Qty: 24 | Refills: 1 | Status: ACTIVE | COMMUNITY
End: 2024-11-09

## 2024-08-15 RX ORDER — CHOLECALCIFEROL (VITAMIN D3) 50 MCG
1 TABLET CAPSULE ORAL ONCE A DAY
Status: ACTIVE | COMMUNITY

## 2024-08-15 RX ORDER — URSODIOL 250 MG/1
1 TABLET WITH FOOD TABLET ORAL
Qty: 270 | Refills: 0 | Status: ACTIVE | COMMUNITY

## 2024-08-15 RX ORDER — VALACYCLOVIR 500 MG/1
1 TABLET TABLET, FILM COATED ORAL BID PRN
Status: ACTIVE | COMMUNITY

## 2024-08-15 RX ORDER — PREDNISONE 1 MG/1
1 TABLET WITH FOOD ON MON AND THURSDAY TABLET ORAL
Qty: 24 | Refills: 1

## 2024-08-15 RX ORDER — AZATHIOPRINE 50 MG/1
TAKE 1 TABLET BY MOUTH DAILY TABLET ORAL
Qty: 90 TABLET | Refills: 1

## 2024-08-15 NOTE — HPI-TODAY'S VISIT:
Pt is a 34 year old female who presents post May 2024 visit for follow up of AIH post pregnancy and she delivered 2022.  Need to redo the labs.  She did u.s at [facility] July 3 and they did not sent it  We need to call for it.   labs show albumin normal at 4.5, bilirubin normal at 0.4 and direct bilirubin 0.11 stable. Your alk phos however was up at 149 and had been normal at 111 back in . AST was 28 and ALT of 19 and previously AST 21 and ALT 10. Per notes, you have been on prednisone 1 mg twice a week ursodiol 750 mg a day aza 50 mg a day and not changed as of last visit. No injury of this. Peach tea may have green tea.    labs show total protein 7.2 and alb 4.1 and tb 0.3 and direct bili 0.11 and alk 118 and ast 21 and alt 10.  May 10 alk 95 and ast 20 and alt 9.  May 24 labs show glucose 94, BUN of 11 creatinine 0.7 sodium 140 potassium 4.2 chloride 104 CO2 of 24 albumin 4.1 bilirubin 0.4 and alkaline phosphatase slightly higher at 126 just above the normal range. AST was 34 and ALT of 17 with ideal alt less than 25. WBC 5.4 normal hemoglobin 12.6 platelet count 184 MCV 94 with normal neutrophils and lymphocytes.  Per notes you are on the prednisone 1 mg twice a week and the ursodiol 750 mg total a day and the Imuran 50 mg a day.  The alkaline phosphatase could go up slightly due to potential bone injury source such as a recent trauma or could be nonspecific lab variation.  May 10 labs show wbc little low 3.2 and normal prior. Hg 11.9 and hct 36.1. Platelet count 207. MCV 95. Neutrophils were normal at 1.9 and lymphocytes 1.0 so that was good to see. Per last notes from  Lipase was 24 normal, glucose of 80 BUN of 12 creatinine 0.67 sodium 143 potassium 4.1 chloride a little up at 109 CO2 of 23 normal. Albumin 3.9, bilirubin 0.3, direct bili 0.12 alk phos 95 AST 20 ALT of 9 and previously AST 22 and ALT of 12.  Per  note, it mentions that you are on the prednisone 1 mg twice a week and we were following the labs on that lower dose.  She has been on that dose now for 6 weeks.   labs show albumin 4.5, bilirubin 0.3, direct bilirubin 0.1 alk phos 108 AST 22 and ALT 12 which are all in the normal range with ideal ALT less than 25.   total protein 7.3 and albumin 4.4 and total bili 0.4 and direct bili 0.11 and alk 119 and ast 20 and alt 9. Prior alk 101 and ast 22 and alt 12.   labs showed lipase normal at 16 and down from 23 back on . Total protein 7.0 albumin 4.1 bilirubin 0.7 and direct bilirubin 0.18. These are all normal range. Your alk phos was 101 slightly up from 95 but still normal. AST down to 22 and ALT of 12 and previously AST 25 and ALT 13. Per the notes from  we had mentioned that if your labs continue to settle out and we would consider making some changes. At this point we have you listed as being on ursodiol 750 mg total a day, the Imuran 50 mg total a day and the prednisone at only 1 mg 3 times a week. At this point if you are feeling comfortable we can proceed to drop that to 1 mg of the prednisone twice a week such as Monday and Thursday. If so then I would recommend you repeat your labs then in 2 weeks and then again in 6 weeks and then again before your visit in May.   labs show lipase 23 and normal.Total protein 7.0 albumin 4.2 total bilirubin 0.3 direct bilirubin 0.1 alk phos 95 AST 25 ALT 13 and these are all back to normal range. As you recall your  labs showed an alk phos of 118 AST of 41 and ALT of 20 I believe you told me you had been ill recently at the time. Good to see that this is now stabilized. I was looking at your meds and you are listed as being on a total of 750 mg of ursodiol a day, Imuran 50 mg a day and the prednisone only at 1 mg 3 times a week and I would go slow at trying to drop this any further. If you redo your labs again in another month and they remain normal then I will be more willing to consider the drop then at that point.   : lipase 21 and down from 38 in  and prior dec lipase 84. Glucose 93 and bun 13 and cr 0.7 and na 136 and k 3.6 and cl 101 and co2 21 and ca 9.0 and alb 4.4 and tb 0.3 and alk 118 and ast 41 elevated and prior 27 and alt 20 up from 16 so likely from the URI that you have. Wbc 4.2 and hg 11.9 ad hct 35.5 and platelets 206 and mcv 91. Neutrophils 3.3 normal but lymphs low at 0.3 and could be due to viral issues. Normal prior. I would do as we planned and let labs settle down and redo them in a month and see where you are tehn and follow course.   2024 lipase 38 and down from 84 spike in dec and close to prior 29. Glucose 108 elevated and bun 9 and cr 0.63 and na 141 and k 3.9 and cl 105 and co2 23 and ca 9.0 and alb 4.0 and tb 0.4 and alk 115 and ast 27 and alt 16. Prior alk 105 and ast 21 and alt 14. Ideal alt less than 25. Wbc 3.7 and hg 11.9 and hct 34.9 and platelets 198. mcv 91.Neutrophils 2.2 and lymphs 1.1.   2023 labs show an isolated lipase elevation of 84 which is above the normal range of 14-72 and you previously had been 29 back in October and 38 in November. Have you been ill recently as that can sometimes cause this particular if he had a gastrointestinal process? Sugar was 85 BUN is 7 creatinine 0.71 sodium 139 potassium 4.1 albumin 4.3 bilirubin 0.4 alk phos 105 AST 21 ALT 14 send no liver labs really to suggest any issues going on. WBC 4.2 hemoglobin 12.1 platelet count 201 MCV 92 with neutrophils normal at 2.8 lymphocytes 0.9. Called pt to see what may have happened of late. She says 2 -3 weeks ago had upper respiratory issues and did not see anyone and resolved. Will plan to redo the labs in 2 weeks and see how doing.    labs showed WBC 4.6 hemoglobin 11.4 platelet count 198 MCV 93 with normal neutrophils and lymphocytes. Glucose 79 BUN of 12 creatinine 0.69 sodium 142 and potassium 3.7 and these are normal. Chloride was slightly up at 107 which is nonspecific. Calcium 9.0 albumin 4.1 bilirubin 0.6 and alk phos normal now at 114 which is good to see. AST of 26 and ALT of 20 with prior AST of 24 and ALT of 10 so approximately the same in the AST but definitely a little higher on the ALT. Ideal  ALT less than 25. Lipase was normal slightly higher at 38.  She says had a little cold at the time.   labs showed lipase 29 glucose 92 BUN of 9 creatinine 0.68 sodium 139 potassium 3.5 calcium 9.3 bilirubin 0.6 alk phos 124 which is slightly higher than before when it was 122.  Normal is from 44 up to 121.AST 24 and ALT 10. Ideal alt is less than 25.When I see the alkaline phosphatase remains only slightly up such as this, I wonder about your vitamin D level.  Please check with primary provider as that may be a little low and that could potentially be explaining the alkaline phosphatase being slightly up due to increased bone turnover from. Could also be due to slight ursodiol dose correction for weight last time.WBC 4.8, hemoglobin 12.1 platelet count 187 MCV 90 with normal neutrophils 3.4 and lymphocytes 1.0.Per notes last change was ursodiol dose slight adjustment to 250mg three times a day. Given increase alk phos would redo labs again previsit in Dec and see if the labs continue to settle   She is currently ursodiol 750mg a day and imuran 50mg a day and prednisone 1mg TIW.  August 3 MRI shows lower thorax with limited images to the lung bases being unremarkable. Liver with no significant fat or iron deposition.  Mild heterogeneity seen of the liver.  Liver appeared to be mildly small in size. There were no definite morphologic changes of advanced cirrhosis.  There were no suspicious liver lesions. The gallbladder was present and contains some sludge or concentrated bile within it.   No bile duct dilation seen. Spleen was normal at 8.9 cm. No dilation seen in the main pancreatic duct and no suspicious lesions. Adrenal glands normal. Kidneys showed no hydronephrosis or focal renal lesion. There was no bowel obstruction sign seen and incidentally noted gastric diverticulum is seen along the posterior aspect of the gastric fundus measuring 2.2 x 1.9 cm. She has not done an egd and did see Dr Iniguez re this. No recall of ulcer. Mildly prominent lymph nodes were seen around the liver.  They were felt to be nonspecific possibly reactive. Liver vessels were patent.  I do suspect however there was some reflux of contrast material into the left gonadal vein. Small volume fluid was seen in the pelvis which I thought was possibly physiologic which is there with a live possibly this was near the time of your cycle. In summary, mild nonspecific heterogeneous changes seen of the liver with no definite changes of cirrhosis.  No splenomegaly seen and no ascites.  Liver vessels patent.  This is good to see and it confirms that the liver probably does have some scarring changes because of your liver disease history but the good news is that there are no signs of any advanced cirrhosis which was the concern.   labs show lipase normal at 31, glucose normal at 87 BUN of 11 creatinine 0.73 sodium 140 potassium 4.0 calcium 9.3 albumin 4.5 bilirubin 0.3 which is down from 0.5. Good to see the alk phos be almost normal now at 122 from previous 128 and prior 173.  Very happy to see that it is almost normal.  AST 27 and ALT 16 which are doing well.  Ideal ALT less than 25. WBC 4.8 hemoglobin 12.3 platelet count 156 MCV 92 with normal neutrophils and lymphocytes. At our last contact on , it states that you are down to 1 mg a day and so that is good to see that this remains stable.  it is very important to taper these very low doses of steroids slowly as we are doing.  Please do the monitoring labs again as we discussed before the next visit in September.   labs show lipase normal at 18, sugar a little elevated at 114.  Had you been fasting that day?  Please share with local providers. BUN of 7 creatinine 0.71 sodium 139 potassium 3.8 albumin 4.4 bili 0.5 and alk phos almost normal now at 128.  Normal is less than 121.  Previously in March it had been 173 for comparison.  AST 25 and ALT 20 with ideal ALT less than 25. WBC 5 hemoglobin 12.3 plate count 208 MCV 92 and normal neutrophils and lymphocytes.  She is 1mg a day of steroid q other day and on that 2023.  Regarding today's labs, the note from [facility] mentions how you had cut back steroids down to 1 mg a day now. If so, these labs would suggest that you are tolerating this well and we need to continue to watch and slowly taper this so that your body can acclimate to the drop.   2023 wbc 4.4 hg 12.1 platelet 225 and mcv 93 and glu 84 and bun 9 and cr 0.84 and na 141 and k 4.0 and cl 104 and co2 24 and alb 4.2 tb 0.4 and db 0.11 and alk 130 and ast 25 and alt 17. lipase 26.  Plan is to drop to 1mg pred and do the labs in 3 and 6 and 12 weeks.  [facility] ultrasound shows liver coarsened in echotexture and nodular contour suggesting cirrhosis.  No lesions were seen.  Important to do this every 6 months for surveillance. Bile ducts not dilated and common bile duct 1.7 mm. Gallbladder normal.  Skinner sign negative. Pancreas normal were seen. Right kidney 8 point centimeters with no hydronephrosis.  Spleen normal at 9.6 cm. Liver vessels were patent. No ascites seen. In summary they felt she had a cirrhotic liver morphology but no suspicious lesions. Very important to keep your labs as normal as possible for the best chance for this to heal over time. We will plan to redo the scan again in 6 months. Discussed the us with the pt. Explained the issues to her. She does need to look to get the egd done to look for varices.  She did not have covid 19 around the time of the u,s.  Right now meds radha 300mg tid, aza 50mg po qd, 1mg po qd.  Vit d3 and 2000 iu po qd calcium with vit d 3 x week.  3-9-2023 wbc 3.6 hg 12.9 and plat 224 and mcv 91 and neutrophils 2.3 and lymphs 0.9.  glu 91 and bun 9 and cr 0.78 an na 141 and k 3.7 and cl 105 and co2 26 and alb 4.3 and tb 0.4 and alk 173 and  ast 28 and alt 20.  Prior alk 163 so that went up some. Lpase 22 and inr 1.09.  She is not planning another.  2022 ultrasound shows liver echogenicity appears increased and with no focal lesions. Common bile duct was normal at 2 mm. Gallbladder appeared normal. No significant ascites was seen. Right kidney was 9.2 cm and no hydronephrosis was seen. Liver vessels were patent. Spleen was normal in size. Overall the liver was echogenic in keeping with possible chronic liver disease but that also could be related to fatty liver. Need to look at your lab patterns. Patent vessels were seen. Long-term we can talk about trying to get an MRI at some point electively to look deeper at this liver issue and sort out fat versus fibrosis better.  She stopped breast feeding in 2023 and went for 11 m.  2022 : lipase 22 and normal. Glu 94 and bun 8 and cr 0.60 and na 139 and k 3.9 and cl 103 and co2 23 and ca 9.3 and alb 4.3 and tb 0.3 and alk 147 and down from 190 on sept 15. Ast 26 and alt 19. Prior ast 35 and alt 27. Wbc 5.5 and hg 11.7 and hct 35.8 and plat 206. Mcv 88 and neutrophils 4.2 and lymphs 0.8.    labs showed liver fraction 74%, bone fraction 23% and intestinal fraction 3% so this confirms that the alkaline phosphatase is mainly coming from liver which is the normal pattern. Albumin 4.4 bilirubin 0.4, direct bilirubin 0.6 alkaline phosphatase 165 up from 163 back in  for comparison.  AST 29 ALT 20 and previous AST 23 and 16 back on .  Sept 15: glu 73 and bun 11 and cr 0.76 and na 142 and k 4.4 and cl 103 and co2 22 and ca 9.6 and total protein 8.0 and alb 4.4 and tb 0.4 and alk 190 (up from 174 and prior 142.) Ast 35 and alt 27. So these are higher.  July limited hepatitis alk 163 and ast 23 and alt 16 they were lower.  Wbc 4.1 and hg 12.5 and hct 40.0 and mcv 88 and platelets 225.  MCHC or mean corpuscular hemoglobin concentration 31.3 little low but up from 29.8 prior. Please share with local provider. Neutrophils 2.8 and lymphs 0.9.   Per notes, on aza 50mg po qd and prednisone 2mg po qd and ursodiol 300mg po tid (increased in July).  She stopped the fenugreek like medicine (crackers took) a few weeks ago for 2 weeks month ago. [employer] Lactation Cheddar crisps: have turmeric in them.  Prior she had started iron pills and taking them less so.          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   labs show alb 4.1 and tb 0.3 and direct bilirubin less than 0.1 and elevated alk phos of 163 and ast 23 and alt 16.  [facility] labs alk 174 and ast 34 and alt 31.   ultrasound shows partially visualized pancreas unremarkable. The liver parenchyma however it did appear to be coarsened but with no mass. Gallbladder thin-walled and no stones. Common bile duct normal at 2 mm.  Right kidney 8.9 cm with no hydronephrosis.  Spleen normal at 9.6 cm. Liver vessels patent. They mention that the coarsening could represent early chronic liver disease.  On your last year ultrasound the liver had some coarsening and even mild surface nodularity seen, so even though it does not appear at first glance to be better, the description that they used is milder now. 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   labs show white blood cell count 4.1 hemoglobin 12.1 platelet count 219 MCV 89 neutrophils 1.6 and lymphocytes 1.9.  These are all in normal range. Glucose 166 still up and down from 361 last time.  BUN of 10 creatinine 0.73 which is now much better than previous 1.45 cr of April.  Please share with primary provider. Sodium 142 potassium 4.0 albumin 4.2.  Total bilirubin 0.4 alkaline phosphatase 68 and AST normal at 33 down from 55.  ALT 28 normal down from 49.  Ideal ALT is less than 25. Look forward to seeing you soon.  May 2022 Did TH with pt. due to itching will reduce dose of radha to bid dosing. max dose around 900mg. continue pred 2mg for now has been on this since 2022.  pt had baby girl on 22,   Dr. Wakefield worked with her in prior pregnancy.  She stayed on imuran during pregnancy. At start prednisone 10mg 8/10/21 and radha bid and weaned as per ob.     labs show normal lipase of 28 previously 31.  Glucose still slightly up at 106 previously 109 BUN of 11 creatinine 0.68 sodium 136 potassium 4.4 calcium 9.5 albumin 4.6 bilirubin 0.4 alkaline phosphatase 90 AST 35 ALT 25.  Prior AST 28 and ALT 17 so slightly higher.  Prior alkaline phosphatase 88.   White blood cell count 8.4 hemoglobin trending better at 10.9 but still low but better than prior10.8 and prior 10.3.  MCV normal 86 platelet count 302 up from 233.  Neutrophils normal at 7 lymphocytes 0.8.   2021 u/s: ultrasound shows the partially visualized pancreas to be unremarkable.  Liver has a coarsened echotexture to its parenchyma and with mild surface nodularity.  No focal mass seen. Gallbladder was thin-walled and the common bile duct was normal at 2 mm.  Right kidney measured 8.5 cm with no hydronephrosis.  Spleen normal 10 cm.  No ascites seen.  Liver vessels were patent. They overall felt that your liver parenchyma appeared to have cirrhotic features and with no focal mass.  Plan: 1.  Pt will follow course off of the green tea containing peach tea. 2.  Pt will redo labs in 4 and 12 weeks and do telemed. 3. Call for the u.s. 4. Pt anselmo stay healthy.  Duration of the visit was 40 min with 10  min of chart prep reviewing multiple  labs and contacts since last visit and load data and information that was available for the visit and setting up in ecw and then an additional 30 min on face to face time and their clinical correlates and then with this information being used to formulate a treatment plan.

## 2024-08-15 NOTE — EXAM-PHYSICAL EXAM
Gen: awake and responsive. Eyes: anicteric, normal lids. Mouth: normal lips. Nose: no drainage. . Hearing: intact grossly. Neck: trachea midline and no jvd. CV: RRR no s3. Lungs: clear. No wheezes, Abd: Soft, nabs, nr, NT. No appreciable hsm. Ext: no sig edema, some palm erythema. Neuro: moves all 4 ext grossly. No asterixis. Skin: no pruritis and some palm erythema.

## 2024-08-17 ENCOUNTER — TELEPHONE ENCOUNTER (OUTPATIENT)
Dept: URBAN - METROPOLITAN AREA CLINIC 86 | Facility: CLINIC | Age: 34
End: 2024-08-17

## 2024-08-17 LAB
ALBUMIN: 4.3
ALKALINE PHOSPHATASE: 132
ALT (SGPT): 14
AST (SGOT): 27
BILIRUBIN, DIRECT: 0.14
BILIRUBIN, TOTAL: 0.5
PROTEIN, TOTAL: 7.3

## 2024-08-17 NOTE — HPI-TODAY'S VISIT:
Dear Paty Combs,  August 15 labs showed total protein 7.3 albumin 4 point bilirubin 0.5 and direct bili 0.14.  Alk phos still elevated but down to 132 from 148 before.  AST was 20 and ALT of 14 and these are little bit lower.  Seems that the green tea is going to take a little longer to wear off.   Would redo labs in 2-3 weeks and see if better then. Dr Isbell

## 2024-08-19 ENCOUNTER — LAB OUTSIDE AN ENCOUNTER (OUTPATIENT)
Dept: URBAN - METROPOLITAN AREA CLINIC 86 | Facility: CLINIC | Age: 34
End: 2024-08-19

## 2024-08-21 ENCOUNTER — TELEPHONE ENCOUNTER (OUTPATIENT)
Dept: URBAN - METROPOLITAN AREA CLINIC 86 | Facility: CLINIC | Age: 34
End: 2024-08-21

## 2024-08-21 LAB
ALBUMIN: 4.1
ALKALINE PHOSPHATASE: 128
ALT (SGPT): 14
AST (SGOT): 23
BASO (ABSOLUTE): 0
BASOS: 1
BILIRUBIN, DIRECT: 0.13
BILIRUBIN, TOTAL: 0.4
BUN/CREATININE RATIO: 11
BUN: 7
CARBON DIOXIDE, TOTAL: 22
CHLORIDE: 103
CREATININE: 0.63
EGFR: 119
EOS (ABSOLUTE): 0.1
EOS: 3
GLUCOSE: 101
HEMATOCRIT: 37.7
HEMATOLOGY COMMENTS:: (no result)
HEMOGLOBIN: 12.5
IMMATURE CELLS: (no result)
IMMATURE GRANS (ABS): 0
IMMATURE GRANULOCYTES: 0
LIPASE: 28
LYMPHS (ABSOLUTE): 0.8
LYMPHS: 21
MCH: 31.2
MCHC: 33.2
MCV: 94
MONOCYTES(ABSOLUTE): 0.3
MONOCYTES: 8
NEUTROPHILS (ABSOLUTE): 2.5
NEUTROPHILS: 67
NRBC: (no result)
PLATELETS: 211
POTASSIUM: 4.2
PROTEIN, TOTAL: 7.3
RBC: 4.01
RDW: 12.1
SODIUM: 139
WBC: 3.8

## 2024-09-02 ENCOUNTER — LAB OUTSIDE AN ENCOUNTER (OUTPATIENT)
Dept: URBAN - METROPOLITAN AREA CLINIC 86 | Facility: CLINIC | Age: 34
End: 2024-09-02

## 2024-09-04 LAB
ALBUMIN: 4.2
ALKALINE PHOSPHATASE: 116
ALT (SGPT): 14
AST (SGOT): 23
BILIRUBIN, DIRECT: <0.1
BILIRUBIN, TOTAL: 0.3
PROTEIN, TOTAL: 6.9

## 2024-09-05 ENCOUNTER — TELEPHONE ENCOUNTER (OUTPATIENT)
Dept: URBAN - METROPOLITAN AREA CLINIC 86 | Facility: CLINIC | Age: 34
End: 2024-09-05

## 2024-09-05 NOTE — HPI-TODAY'S VISIT:
Dear Paty Combs,  The 9/3/24 labs were sent to us.  The bilirubin 0.3, alkaline phosphatase 116, AST 23 and ALT 14.  Goal for the AST and ALT is less than 25 and happy to see that you are there.  I am watching some of Dr. Isbell's labs while he is out of the office. These are lower than the previous labs and happy to see that. Need to keep a close eye given the jump. Be sure to do the next set before the appointment.  Shanna Lowe PA-C

## 2024-09-12 ENCOUNTER — LAB OUTSIDE AN ENCOUNTER (OUTPATIENT)
Dept: URBAN - METROPOLITAN AREA CLINIC 86 | Facility: CLINIC | Age: 34
End: 2024-09-12

## 2024-10-15 ENCOUNTER — WEB ENCOUNTER (OUTPATIENT)
Dept: URBAN - METROPOLITAN AREA CLINIC 86 | Facility: CLINIC | Age: 34
End: 2024-10-15

## 2024-11-01 ENCOUNTER — LAB OUTSIDE AN ENCOUNTER (OUTPATIENT)
Dept: URBAN - METROPOLITAN AREA CLINIC 86 | Facility: CLINIC | Age: 34
End: 2024-11-01

## 2024-11-01 ENCOUNTER — TELEPHONE ENCOUNTER (OUTPATIENT)
Dept: URBAN - METROPOLITAN AREA CLINIC 86 | Facility: CLINIC | Age: 34
End: 2024-11-01

## 2024-11-01 LAB
ALBUMIN: 4.2
ALKALINE PHOSPHATASE: 110
ALT (SGPT): 16
AST (SGOT): 26
BASO (ABSOLUTE): 0
BASOS: 1
BILIRUBIN, TOTAL: 0.5
BUN/CREATININE RATIO: 21
BUN: 13
CALCIUM: 9
CARBON DIOXIDE, TOTAL: 22
CHLORIDE: 103
CREATININE: 0.61
EGFR: 119
EOS (ABSOLUTE): 0
EOS: 1
GLOBULIN, TOTAL: 3.3
GLUCOSE: 88
HEMATOCRIT: 38.2
HEMATOLOGY COMMENTS:: (no result)
HEMOGLOBIN: 12.4
IMMATURE CELLS: (no result)
IMMATURE GRANS (ABS): 0
IMMATURE GRANULOCYTES: 1
LYMPHS (ABSOLUTE): 0.7
LYMPHS: 16
MCH: 30.8
MCHC: 32.5
MCV: 95
MONOCYTES(ABSOLUTE): 0.3
MONOCYTES: 7
NEUTROPHILS (ABSOLUTE): 3.3
NEUTROPHILS: 74
NRBC: (no result)
PLATELETS: 185
POTASSIUM: 3.8
PROTEIN, TOTAL: 7.5
RBC: 4.02
RDW: 12.4
SODIUM: 138
WBC: 4.3

## 2024-11-01 NOTE — HPI-TODAY'S VISIT:
Dear Paty Combs, October 30 labs showed WBC 4.3 hemoglobin 12.4 MCV 95 and platelet count 185 and neutrophils and lymphocytes normal which is good to see. Glucose was 88 BUN of 13 creatinine 0.61 sodium 138 potassium 3.8 calcium 9.0 bilirubin 0.5 and alk phos normal at 110 and AST 26 and ALT is 16.   Prior September 3 labs showed alk phos 116 AST 23 and ALT 14.  As you recall, earlier in the late summer we suspected that the labs had bumped up because of some green tea exposure and it took a little bit longer than expected but as you can see it did finally settle down. Glad to see this. Can slow down the lab draws now. Dr. Isbell

## 2024-11-04 ENCOUNTER — LAB OUTSIDE AN ENCOUNTER (OUTPATIENT)
Dept: URBAN - METROPOLITAN AREA CLINIC 86 | Facility: CLINIC | Age: 34
End: 2024-11-04

## 2024-11-04 ENCOUNTER — TELEPHONE ENCOUNTER (OUTPATIENT)
Dept: URBAN - METROPOLITAN AREA CLINIC 86 | Facility: CLINIC | Age: 34
End: 2024-11-04

## 2024-11-04 NOTE — HPI-TODAY'S VISIT:
Pt is a 34 year old female who presents post Aug  2024 visit for follow up of [facility] post pregnancy and she delivered 2022. Need to redo the labs. She did u.s at [facility] July 3 and they did not sent it  We need to call for it. Dear Paty Combs,  labs showed WBC 4.3 hemoglobin 12.4 MCV 95 and platelet count 185 and neutrophils and lymphocytes normal which is good to see. Glucose was 88 BUN of 13 creatinine 0.61 sodium 138 potassium 3.8 calcium 9.0 bilirubin 0.5 and alk phos normal at 110 and AST 26 and ALT is 16. Prior September 3 labs showed alk phos 116 AST 23 and ALT 14. As you recall, earlier in the late summer we suspected that the labs had bumped up because of some green tea exposure and it took a little bit longer than expected but as you can see it did finally settle down. Glad to see this. Can slow down the lab draws now. Dr. Isbell Dear Paty Combs, The 9/3/24 labs were sent to us. The bilirubin 0.3, alkaline phosphatase 116, AST 23 and ALT 14. Goal for the AST and ALT is less than 25 and happy to see that you are there. I am watching some of Dr. Isbell's labs while he is out of the office. These are lower than the previous labs and happy to see that. Need to keep a close eye given the jump. Be sure to do the next set before the appointment. Shanna Lowe PA-C Dear Paty Combs,  labs showed lipase stable for you at 28 from previous 24 in May. Sugar was a little up at 101 and unclear if you were fasting?  BUN of 7 creatinine 0.63 sodium 139 potassium 4.2 chloride normal at this time at 103 and CO2 of 22. WBC 3.8 hemoglobin 12.5 platelet count 211 MCV 94 with normal neutrophils and lymphocytes. Total protein 7.3 albumin 4.1 bilirubin 0.4 and direct bili 0.13 and these are normal. Alk phos continues to drop now down to 128 from 132 and prior 149.  AST down to 23 and ALT down to 14 and this had come down from previous AST 27 and ALT 14 on prior AST 28 and ALT 19. It does appear that you are drifting back to baseline and I would probably repeat the labs again in 2 weeks and hopefully by then you are back to your baseline. Dr. Isbell Dear Paty Combs, August 15 labs showed total protein 7.3 albumin 4 point bilirubin 0.5 and direct bili 0.14. Alk phos still elevated but down to 132 from 148 before. AST was 20 and ALT of 14 and these are little bit lower. Seems that the green tea is going to take a little longer to wear off. Would redo labs in 2-3 weeks and see if better then. Dr Isbell   labs show albumin normal at 4.5, bilirubin normal at 0.4 and direct bilirubin 0.11 stable. Your alk phos however was up at 149 and had been normal at 111 back in . AST was 28 and ALT of 19 and previously AST 21 and ALT 10. Per notes, you have been on prednisone 1 mg twice a week ursodiol 750 mg a day aza 50 mg a day and not changed as of last visit. No injury of this. Peach tea may have green tea.   labs show total protein 7.2 and alb 4.1 and tb 0.3 and direct bili 0.11 and alk 118 and ast 21 and alt 10. May 10 alk 95 and ast 20 and alt 9. May 24 labs show glucose 94, BUN of 11 creatinine 0.7 sodium 140 potassium 4.2 chloride 104 CO2 of 24 albumin 4.1 bilirubin 0.4 and alkaline phosphatase slightly higher at 126 just above the normal range. AST was 34 and ALT of 17 with ideal alt less than 25. WBC 5.4 normal hemoglobin 12.6 platelet count 184 MCV 94 with normal neutrophils and lymphocytes. Per notes you are on the prednisone 1 mg twice a week and the ursodiol 750 mg total a day and the Imuran 50 mg a day. The alkaline phosphatase could go up slightly due to potential bone injury source such as a recent trauma or could be nonspecific lab variation. May 10 labs show wbc little low 3.2 and normal prior. Hg 11.9 and hct 36.1. Platelet count 207. MCV 95. Neutrophils were normal at 1.9 and lymphocytes 1.0 so that was good to see. Per last notes from  Lipase was 24 normal, glucose of 80 BUN of 12 creatinine 0.67 sodium 143 potassium 4.1 chloride a little up at 109 CO2 of 23 normal. Albumin 3.9, bilirubin 0.3, direct bili 0.12 alk phos 95 AST 20 ALT of 9 and previously AST 22 and ALT of 12. Per  note, it mentions that you are on the prednisone 1 mg twice a week and we were following the labs on that lower dose. She has been on that dose now for 6 weeks.  labs show albumin 4.5, bilirubin 0.3, direct bilirubin 0.1 alk phos 108 AST 22 and ALT 12 which are all in the normal range with ideal ALT less than 25.  total protein 7.3 and albumin 4.4 and total bili 0.4 and direct bili 0.11 and alk 119 and ast 20 and alt 9. Prior alk 101 and ast 22 and alt 12.  labs showed lipase normal at 16 and down from 23 back on . Total protein 7.0 albumin 4.1 bilirubin 0.7 and direct bilirubin 0.18. These are all normal range. Your alk phos was 101 slightly up from 95 but still normal. AST down to 22 and ALT of 12 and previously AST 25 and ALT 13. Per the notes from  we had mentioned that if your labs continue to settle out and we would consider making some changes. At this point we have you listed as being on ursodiol 750 mg total a day, the Imuran 50 mg total a day and the prednisone at only 1 mg 3 times a week. At this point if you are feeling comfortable we can proceed to drop that to 1 mg of the prednisone twice a week such as Monday and Thursday. If so then I would recommend you repeat your labs then in 2 weeks and then again in 6 weeks and then again before your visit in May.  labs show lipase 23 and normal.Total protein 7.0 albumin 4.2 total bilirubin 0.3 direct bilirubin 0.1 alk phos 95 AST 25 ALT 13 and these are all back to normal range. As you recall your  labs showed an alk phos of 118 AST of 41 and ALT of 20 I believe you told me you had been ill recently at the time. Good to see that this is now stabilized. I was looking at your meds and you are listed as being on a total of 750 mg of ursodiol a day, Imuran 50 mg a day and the prednisone only at 1 mg 3 times a week and I would go slow at trying to drop this any further. If you redo your labs again in another month and they remain normal then I will be more willing to consider the drop then at that point. : lipase 21 and down from 38 in  and prior dec lipase 84. Glucose 93 and bun 13 and cr 0.7 and na 136 and k 3.6 and cl 101 and co2 21 and ca 9.0 and alb 4.4 and tb 0.3 and alk 118 and ast 41 elevated and prior 27 and alt 20 up from 16 so likely from the URI that you have. Wbc 4.2 and hg 11.9 ad hct 35.5 and platelets 206 and mcv 91. Neutrophils 3.3 normal but lymphs low at 0.3 and could be due to viral issues. Normal prior. I would do as we planned and let labs settle down and redo them in a month and see where you are tehn and follow course. 2024 lipase 38 and down from 84 spike in dec and close to prior 29. Glucose 108 elevated and bun 9 and cr 0.63 and na 141 and k 3.9 and cl 105 and co2 23 and ca 9.0 and alb 4.0 and tb 0.4 and alk 115 and ast 27 and alt 16. Prior alk 105 and ast 21 and alt 14. Ideal alt less than 25. Wbc 3.7 and hg 11.9 and hct 34.9 and platelets 198. mcv 91.Neutrophils 2.2 and lymphs 1.1. 2023 labs show an isolated lipase elevation of 84 which is above the normal range of 14-72 and you previously had been 29 back in October and 38 in November. Have you been ill recently as that can sometimes cause this particular if he had a gastrointestinal process? Sugar was 85 BUN is 7 creatinine 0.71 sodium 139 potassium 4.1 albumin 4.3 bilirubin 0.4 alk phos 105 AST 21 ALT 14 send no liver labs really to suggest any issues going on. WBC 4.2 hemoglobin 12.1 platelet count 201 MCV 92 with neutrophils normal at 2.8 lymphocytes 0.9. Called pt to see what may have happened of late. She says 2 -3 weeks ago had upper respiratory issues and did not see anyone and resolved. Will plan to redo the labs in 2 weeks and see how doing.  labs showed WBC 4.6 hemoglobin 11.4 platelet count 198 MCV 93 with normal neutrophils and lymphocytes. Glucose 79 BUN of 12 creatinine 0.69 sodium 142 and potassium 3.7 and these are normal. Chloride was slightly up at 107 which is nonspecific. Calcium 9.0 albumin 4.1 bilirubin 0.6 and alk phos normal now at 114 which is good to see. AST of 26 and ALT of 20 with prior AST of 24 and ALT of 10 so approximately the same in the AST but definitely a little higher on the ALT. Ideal  ALT less than 25. Lipase was normal slightly higher at 38. She says had a little cold at the time.  labs showed lipase 29 glucose 92 BUN of 9 creatinine 0.68 sodium 139 potassium 3.5 calcium 9.3 bilirubin 0.6 alk phos 124 which is slightly higher than before when it was 122.  Normal is from 44 up to 121.AST 24 and ALT 10. Ideal alt is less than 25.When I see the alkaline phosphatase remains only slightly up such as this, I wonder about your vitamin D level.  Please check with primary provider as that may be a little low and that could potentially be explaining the alkaline phosphatase being slightly up due to increased bone turnover from. Could also be due to slight ursodiol dose correction for weight last time.WBC 4.8, hemoglobin 12.1 platelet count 187 MCV 90 with normal neutrophils 3.4 and lymphocytes 1.0.Per notes last change was ursodiol dose slight adjustment to 250mg three times a day. Given increase alk phos would redo labs again previsit in Dec and see if the labs continue to settle  She is currently ursodiol 750mg a day and imuran 50mg a day and prednisone 1mg TIW. August 3 MRI shows lower thorax with limited images to the lung bases being unremarkable. Liver with no significant fat or iron deposition. Mild heterogeneity seen of the liver. Liver appeared to be mildly small in size. There were no definite morphologic changes of advanced cirrhosis. There were no suspicious liver lesions. The gallbladder was present and contains some sludge or concentrated bile within it. No bile duct dilation seen. Spleen was normal at 8.9 cm. No dilation seen in the main pancreatic duct and no suspicious lesions. Adrenal glands normal. Kidneys showed no hydronephrosis or focal renal lesion. There was no bowel obstruction sign seen and incidentally noted gastric diverticulum is seen along the posterior aspect of the gastric fundus measuring 2.2 x 1.9 cm. She has not done an egd and did see Dr Iniguez re this. No recall of ulcer. Mildly prominent lymph nodes were seen around the liver. They were felt to be nonspecific possibly reactive. Liver vessels were patent. I do suspect however there was some reflux of contrast material into the left gonadal vein. Small volume fluid was seen in the pelvis which I thought was possibly physiologic which is there with a live possibly this was near the time of your cycle. In summary, mild nonspecific heterogeneous changes seen of the liver with no definite changes of cirrhosis. No splenomegaly seen and no ascites. Liver vessels patent. This is good to see and it confirms that the liver probably does have some scarring changes because of your liver disease history but the good news is that there are no signs of any advanced cirrhosis which was the concern.  labs show lipase normal at 31, glucose normal at 87 BUN of 11 creatinine 0.73 sodium 140 potassium 4.0 calcium 9.3 albumin 4.5 bilirubin 0.3 which is down from 0.5. Good to see the alk phos be almost normal now at 122 from previous 128 and prior 173. Very happy to see that it is almost normal. AST 27 and ALT 16 which are doing well. Ideal ALT less than 25. WBC 4.8 hemoglobin 12.3 platelet count 156 MCV 92 with normal neutrophils and lymphocytes. At our last contact on , it states that you are down to 1 mg a day and so that is good to see that this remains stable. it is very important to taper these very low doses of steroids slowly as we are doing. Please do the monitoring labs again as we discussed before the next visit in September.  labs show lipase normal at 18, sugar a little elevated at 114. Had you been fasting that day? Please share with local providers. BUN of 7 creatinine 0.71 sodium 139 potassium 3.8 albumin 4.4 bili 0.5 and alk phos almost normal now at 128. Normal is less than 121. Previously in March it had been 173 for comparison. AST 25 and ALT 20 with ideal ALT less than 25. WBC 5 hemoglobin 12.3 plate count 208 MCV 92 and normal neutrophils and lymphocytes. She is 1mg a day of steroid q other day and on that 2023. Regarding today's labs, the note from [facility] mentions how you had cut back steroids down to 1 mg a day now. If so, these labs would suggest that you are tolerating this well and we need to continue to watch and slowly taper this so that your body can acclimate to the drop. 2023 wbc 4.4 hg 12.1 platelet 225 and mcv 93 and glu 84 and bun 9 and cr 0.84 and na 141 and k 4.0 and cl 104 and co2 24 and alb 4.2 tb 0.4 and db 0.11 and alk 130 and ast 25 and alt 17. lipase 26. Plan is to drop to 1mg pred and do the labs in 3 and 6 and 12 weeks. [facility] ultrasound shows liver coarsened in echotexture and nodular contour suggesting cirrhosis. No lesions were seen. Important to do this every 6 months for surveillance. Bile ducts not dilated and common bile duct 1.7 mm. Gallbladder normal. Skinner sign negative. Pancreas normal were seen. Right kidney 8 point centimeters with no hydronephrosis. Spleen normal at 9.6 cm. Liver vessels were patent. No ascites seen. In summary they felt she had a cirrhotic liver morphology but no suspicious lesions. Very important to keep your labs as normal as possible for the best chance for this to heal over time. We will plan to redo the scan again in 6 months. Discussed the us with the pt. Explained the issues to her. She does need to look to get the egd done to look for varices. She did not have covid 19 around the time of the u,s. Right now meds radha 300mg tid, aza 50mg po qd, 1mg po qd. Vit d3 and 2000 iu po qd calcium with vit d 3 x week. 3-9-2023 wbc 3.6 hg 12.9 and plat 224 and mcv 91 and neutrophils 2.3 and lymphs 0.9. glu 91 and bun 9 and cr 0.78 an na 141 and k 3.7 and cl 105 and co2 26 and alb 4.3 and tb 0.4 and alk 173 and ast 28 and alt 20. Prior alk 163 so that went up some. Lpase 22 and inr 1.09. She is not planning another. 2022 ultrasound shows liver echogenicity appears increased and with no focal lesions. Common bile duct was normal at 2 mm. Gallbladder appeared normal. No significant ascites was seen. Right kidney was 9.2 cm and no hydronephrosis was seen. Liver vessels were patent. Spleen was normal in size. Overall the liver was echogenic in keeping with possible chronic liver disease but that also could be related to fatty liver. Need to look at your lab patterns. Patent vessels were seen. Long-term we can talk about trying to get an MRI at some point electively to look deeper at this liver issue and sort out fat versus fibrosis better. She stopped breast feeding in 2023 and went for 11 m. 2022 : lipase 22 and normal. Glu 94 and bun 8 and cr 0.60 and na 139 and k 3.9 and cl 103 and co2 23 and ca 9.3 and alb 4.3 and tb 0.3 and alk 147 and down from 190 on sept 15. Ast 26 and alt 19. Prior ast 35 and alt 27. Wbc 5.5 and hg 11.7 and hct 35.8 and plat 206. Mcv 88 and neutrophils 4.2 and lymphs 0.8.  labs showed liver fraction 74%, bone fraction 23% and intestinal fraction 3% so this confirms that the alkaline phosphatase is mainly coming from liver which is the normal pattern. Albumin 4.4 bilirubin 0.4, direct bilirubin 0.6 alkaline phosphatase 165 up from 163 back in  for comparison. AST 29 ALT 20 and previous AST 23 and 16 back on . Sept 15: glu 73 and bun 11 and cr 0.76 and na 142 and k 4.4 and cl 103 and co2 22 and ca 9.6 and total protein 8.0 and alb 4.4 and tb 0.4 and alk 190 (up from 174 and prior 142.) Ast 35 and alt 27. So these are higher. July limited hepatitis alk 163 and ast 23 and alt 16 they were lower. Wbc 4.1 and hg 12.5 and hct 40.0 and mcv 88 and platelets 225. MCHC or mean corpuscular hemoglobin concentration 31.3 little low but up from 29.8 prior. Please share with local provider. Neutrophils 2.8 and lymphs 0.9. Per notes, on aza 50mg po qd and prednisone 2mg po qd and ursodiol 300mg po tid (increased in July). She stopped the fenugreek like medicine (crackers took) a few weeks ago for 2 weeks month ago. [employer] L[employer] Cheddar crisps: have turmeric in them. Prior she had started iron pills and taking them less so. 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  labs show alb 4.1 and tb 0.3 and direct bilirubin less than 0.1 and elevated alk phos of 163 and ast 23 and alt 16. [facility] labs alk 174 and ast 34 and alt 31.  ultrasound shows partially visualized pancreas unremarkable. The liver parenchyma however it did appear to be coarsened but with no mass. Gallbladder thin-walled and no stones. Common bile duct normal at 2 mm. Right kidney 8.9 cm with no hydronephrosis. Spleen normal at 9.6 cm. Liver vessels patent. They mention that the coarsening could represent early chronic liver disease. On your last year ultrasound the liver had some coarsening and even mild surface nodularity seen, so even though it does not appear at first glance to be better, the description that they used is milder now. 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  labs show white blood cell count 4.1 hemoglobin 12.1 platelet count 219 MCV 89 neutrophils 1.6 and lymphocytes 1.9. These are all in normal range. Glucose 166 still up and down from 361 last time. BUN of 10 creatinine 0.73 which is now much better than previous 1.45 cr of April. Please share with primary provider. Sodium 142 potassium 4.0 albumin 4.2. Total bilirubin 0.4 alkaline phosphatase 68 and AST normal at 33 down from 55. ALT 28 normal down from 49. Ideal ALT is less than 25. Look forward to seeing you soon. May 2022 Did TH with pt. due to itching will reduce dose of radha to bid dosing. max dose around 900mg. continue pred 2mg for now has been on this since 2022. pt had baby girl on 22,  Dr. Wakefield worked with her in prior pregnancy. She stayed on imuran during pregnancy. At start prednisone 10mg 8/10/21 and radha bid and weaned as per ob.  labs show normal lipase of 28 previously 31. Glucose still slightly up at 106 previously 109 BUN of 11 creatinine 0.68 sodium 136 potassium 4.4 calcium 9.5 albumin 4.6 bilirubin 0.4 alkaline phosphatase 90 AST 35 ALT 25. Prior AST 28 and ALT 17 so slightly higher. Prior alkaline phosphatase 88. White blood cell count 8.4 hemoglobin trending better at 10.9 but still low but better than prior10.8 and prior 10.3. MCV normal 86 platelet count 302 up from 233. Neutrophils normal at 7 lymphocytes 0.8. 2021 u/s: ultrasound shows the partially visualized pancreas to be unremarkable. Liver has a coarsened echotexture to its parenchyma and with mild surface nodularity. No focal mass seen. Gallbladder was thin-walled and the common bile duct was normal at 2 mm. Right kidney measured 8.5 cm with no hydronephrosis. Spleen normal 10 cm. No ascites seen. Liver vessels were patent. They overall felt that your liver parenchyma appeared to have cirrhotic features and with no focal mass. Plan: 1.  Pt will follow course off of the green tea containing peach tea. 2.  Pt will redo labs in 4 and 12 weeks and do telemed. 3. Call for the u.s. 4. Pt anselmo stay healthy. Duration of the visit was  min with 10  min of chart prep reviewing multiple  labs and contacts since last visit and load data and information that was available for the visit and setting up in ecw and then an additional 30 min on face to face time and their clinical correlates and then with this information being used to formulate a treatment plan.

## 2024-11-04 NOTE — HPI-TODAY'S VISIT:
Pt is a 34 year old female who presents post Aug  2024 visit for follow up of [facility] post pregnancy and she delivered 2022. Need to redo the labs. She did u.s at [facility] July 3 and they did not sent it  We need to call for it. Dear Paty Combs,  labs showed WBC 4.3 hemoglobin 12.4 MCV 95 and platelet count 185 and neutrophils and lymphocytes normal which is good to see. Glucose was 88 BUN of 13 creatinine 0.61 sodium 138 potassium 3.8 calcium 9.0 bilirubin 0.5 and alk phos normal at 110 and AST 26 and ALT is 16. Prior September 3 labs showed alk phos 116 AST 23 and ALT 14. As you recall, earlier in the late summer we suspected that the labs had bumped up because of some green tea exposure and it took a little bit longer than expected but as you can see it did finally settle down. Glad to see this. Can slow down the lab draws now. Dr. Isbell Dear Paty Combs, The 9/3/24 labs were sent to us. The bilirubin 0.3, alkaline phosphatase 116, AST 23 and ALT 14. Goal for the AST and ALT is less than 25 and happy to see that you are there. I am watching some of Dr. Isbell's labs while he is out of the office. These are lower than the previous labs and happy to see that. Need to keep a close eye given the jump. Be sure to do the next set before the appointment. Shanna Lowe PA-C Dear Paty Combs,  labs showed lipase stable for you at 28 from previous 24 in May. Sugar was a little up at 101 and unclear if you were fasting?  BUN of 7 creatinine 0.63 sodium 139 potassium 4.2 chloride normal at this time at 103 and CO2 of 22. WBC 3.8 hemoglobin 12.5 platelet count 211 MCV 94 with normal neutrophils and lymphocytes. Total protein 7.3 albumin 4.1 bilirubin 0.4 and direct bili 0.13 and these are normal. Alk phos continues to drop now down to 128 from 132 and prior 149.  AST down to 23 and ALT down to 14 and this had come down from previous AST 27 and ALT 14 on prior AST 28 and ALT 19. It does appear that you are drifting back to baseline and I would probably repeat the labs again in 2 weeks and hopefully by then you are back to your baseline. Dr. Isbell Dear Paty Combs, August 15 labs showed total protein 7.3 albumin 4 point bilirubin 0.5 and direct bili 0.14. Alk phos still elevated but down to 132 from 148 before. AST was 20 and ALT of 14 and these are little bit lower. Seems that the green tea is going to take a little longer to wear off. Would redo labs in 2-3 weeks and see if better then. Dr Isbell   labs show albumin normal at 4.5, bilirubin normal at 0.4 and direct bilirubin 0.11 stable. Your alk phos however was up at 149 and had been normal at 111 back in . AST was 28 and ALT of 19 and previously AST 21 and ALT 10. Per notes, you have been on prednisone 1 mg twice a week ursodiol 750 mg a day aza 50 mg a day and not changed as of last visit. No injury of this. Peach tea may have green tea.   labs show total protein 7.2 and alb 4.1 and tb 0.3 and direct bili 0.11 and alk 118 and ast 21 and alt 10. May 10 alk 95 and ast 20 and alt 9. May 24 labs show glucose 94, BUN of 11 creatinine 0.7 sodium 140 potassium 4.2 chloride 104 CO2 of 24 albumin 4.1 bilirubin 0.4 and alkaline phosphatase slightly higher at 126 just above the normal range. AST was 34 and ALT of 17 with ideal alt less than 25. WBC 5.4 normal hemoglobin 12.6 platelet count 184 MCV 94 with normal neutrophils and lymphocytes. Per notes you are on the prednisone 1 mg twice a week and the ursodiol 750 mg total a day and the Imuran 50 mg a day. The alkaline phosphatase could go up slightly due to potential bone injury source such as a recent trauma or could be nonspecific lab variation. May 10 labs show wbc little low 3.2 and normal prior. Hg 11.9 and hct 36.1. Platelet count 207. MCV 95. Neutrophils were normal at 1.9 and lymphocytes 1.0 so that was good to see. Per last notes from  Lipase was 24 normal, glucose of 80 BUN of 12 creatinine 0.67 sodium 143 potassium 4.1 chloride a little up at 109 CO2 of 23 normal. Albumin 3.9, bilirubin 0.3, direct bili 0.12 alk phos 95 AST 20 ALT of 9 and previously AST 22 and ALT of 12. Per  note, it mentions that you are on the prednisone 1 mg twice a week and we were following the labs on that lower dose. She has been on that dose now for 6 weeks.  labs show albumin 4.5, bilirubin 0.3, direct bilirubin 0.1 alk phos 108 AST 22 and ALT 12 which are all in the normal range with ideal ALT less than 25.  total protein 7.3 and albumin 4.4 and total bili 0.4 and direct bili 0.11 and alk 119 and ast 20 and alt 9. Prior alk 101 and ast 22 and alt 12.  labs showed lipase normal at 16 and down from 23 back on . Total protein 7.0 albumin 4.1 bilirubin 0.7 and direct bilirubin 0.18. These are all normal range. Your alk phos was 101 slightly up from 95 but still normal. AST down to 22 and ALT of 12 and previously AST 25 and ALT 13. Per the notes from  we had mentioned that if your labs continue to settle out and we would consider making some changes. At this point we have you listed as being on ursodiol 750 mg total a day, the Imuran 50 mg total a day and the prednisone at only 1 mg 3 times a week. At this point if you are feeling comfortable we can proceed to drop that to 1 mg of the prednisone twice a week such as Monday and Thursday. If so then I would recommend you repeat your labs then in 2 weeks and then again in 6 weeks and then again before your visit in May.  labs show lipase 23 and normal.Total protein 7.0 albumin 4.2 total bilirubin 0.3 direct bilirubin 0.1 alk phos 95 AST 25 ALT 13 and these are all back to normal range. As you recall your  labs showed an alk phos of 118 AST of 41 and ALT of 20 I believe you told me you had been ill recently at the time. Good to see that this is now stabilized. I was looking at your meds and you are listed as being on a total of 750 mg of ursodiol a day, Imuran 50 mg a day and the prednisone only at 1 mg 3 times a week and I would go slow at trying to drop this any further. If you redo your labs again in another month and they remain normal then I will be more willing to consider the drop then at that point. : lipase 21 and down from 38 in  and prior dec lipase 84. Glucose 93 and bun 13 and cr 0.7 and na 136 and k 3.6 and cl 101 and co2 21 and ca 9.0 and alb 4.4 and tb 0.3 and alk 118 and ast 41 elevated and prior 27 and alt 20 up from 16 so likely from the URI that you have. Wbc 4.2 and hg 11.9 ad hct 35.5 and platelets 206 and mcv 91. Neutrophils 3.3 normal but lymphs low at 0.3 and could be due to viral issues. Normal prior. I would do as we planned and let labs settle down and redo them in a month and see where you are tehn and follow course. 2024 lipase 38 and down from 84 spike in dec and close to prior 29. Glucose 108 elevated and bun 9 and cr 0.63 and na 141 and k 3.9 and cl 105 and co2 23 and ca 9.0 and alb 4.0 and tb 0.4 and alk 115 and ast 27 and alt 16. Prior alk 105 and ast 21 and alt 14. Ideal alt less than 25. Wbc 3.7 and hg 11.9 and hct 34.9 and platelets 198. mcv 91.Neutrophils 2.2 and lymphs 1.1. 2023 labs show an isolated lipase elevation of 84 which is above the normal range of 14-72 and you previously had been 29 back in October and 38 in November. Have you been ill recently as that can sometimes cause this particular if he had a gastrointestinal process? Sugar was 85 BUN is 7 creatinine 0.71 sodium 139 potassium 4.1 albumin 4.3 bilirubin 0.4 alk phos 105 AST 21 ALT 14 send no liver labs really to suggest any issues going on. WBC 4.2 hemoglobin 12.1 platelet count 201 MCV 92 with neutrophils normal at 2.8 lymphocytes 0.9. Called pt to see what may have happened of late. She says 2 -3 weeks ago had upper respiratory issues and did not see anyone and resolved. Will plan to redo the labs in 2 weeks and see how doing.  labs showed WBC 4.6 hemoglobin 11.4 platelet count 198 MCV 93 with normal neutrophils and lymphocytes. Glucose 79 BUN of 12 creatinine 0.69 sodium 142 and potassium 3.7 and these are normal. Chloride was slightly up at 107 which is nonspecific. Calcium 9.0 albumin 4.1 bilirubin 0.6 and alk phos normal now at 114 which is good to see. AST of 26 and ALT of 20 with prior AST of 24 and ALT of 10 so approximately the same in the AST but definitely a little higher on the ALT. Ideal  ALT less than 25. Lipase was normal slightly higher at 38. She says had a little cold at the time.  labs showed lipase 29 glucose 92 BUN of 9 creatinine 0.68 sodium 139 potassium 3.5 calcium 9.3 bilirubin 0.6 alk phos 124 which is slightly higher than before when it was 122.  Normal is from 44 up to 121.AST 24 and ALT 10. Ideal alt is less than 25.When I see the alkaline phosphatase remains only slightly up such as this, I wonder about your vitamin D level.  Please check with primary provider as that may be a little low and that could potentially be explaining the alkaline phosphatase being slightly up due to increased bone turnover from. Could also be due to slight ursodiol dose correction for weight last time.WBC 4.8, hemoglobin 12.1 platelet count 187 MCV 90 with normal neutrophils 3.4 and lymphocytes 1.0.Per notes last change was ursodiol dose slight adjustment to 250mg three times a day. Given increase alk phos would redo labs again previsit in Dec and see if the labs continue to settle  She is currently ursodiol 750mg a day and imuran 50mg a day and prednisone 1mg TIW. August 3 MRI shows lower thorax with limited images to the lung bases being unremarkable. Liver with no significant fat or iron deposition. Mild heterogeneity seen of the liver. Liver appeared to be mildly small in size. There were no definite morphologic changes of advanced cirrhosis. There were no suspicious liver lesions. The gallbladder was present and contains some sludge or concentrated bile within it. No bile duct dilation seen. Spleen was normal at 8.9 cm. No dilation seen in the main pancreatic duct and no suspicious lesions. Adrenal glands normal. Kidneys showed no hydronephrosis or focal renal lesion. There was no bowel obstruction sign seen and incidentally noted gastric diverticulum is seen along the posterior aspect of the gastric fundus measuring 2.2 x 1.9 cm. She has not done an egd and did see Dr Iniguez re this. No recall of ulcer. Mildly prominent lymph nodes were seen around the liver. They were felt to be nonspecific possibly reactive. Liver vessels were patent. I do suspect however there was some reflux of contrast material into the left gonadal vein. Small volume fluid was seen in the pelvis which I thought was possibly physiologic which is there with a live possibly this was near the time of your cycle. In summary, mild nonspecific heterogeneous changes seen of the liver with no definite changes of cirrhosis. No splenomegaly seen and no ascites. Liver vessels patent. This is good to see and it confirms that the liver probably does have some scarring changes because of your liver disease history but the good news is that there are no signs of any advanced cirrhosis which was the concern.  labs show lipase normal at 31, glucose normal at 87 BUN of 11 creatinine 0.73 sodium 140 potassium 4.0 calcium 9.3 albumin 4.5 bilirubin 0.3 which is down from 0.5. Good to see the alk phos be almost normal now at 122 from previous 128 and prior 173. Very happy to see that it is almost normal. AST 27 and ALT 16 which are doing well. Ideal ALT less than 25. WBC 4.8 hemoglobin 12.3 platelet count 156 MCV 92 with normal neutrophils and lymphocytes. At our last contact on , it states that you are down to 1 mg a day and so that is good to see that this remains stable. it is very important to taper these very low doses of steroids slowly as we are doing. Please do the monitoring labs again as we discussed before the next visit in September.  labs show lipase normal at 18, sugar a little elevated at 114. Had you been fasting that day? Please share with local providers. BUN of 7 creatinine 0.71 sodium 139 potassium 3.8 albumin 4.4 bili 0.5 and alk phos almost normal now at 128. Normal is less than 121. Previously in March it had been 173 for comparison. AST 25 and ALT 20 with ideal ALT less than 25. WBC 5 hemoglobin 12.3 plate count 208 MCV 92 and normal neutrophils and lymphocytes. She is 1mg a day of steroid q other day and on that 2023. Regarding today's labs, the note from [facility] mentions how you had cut back steroids down to 1 mg a day now. If so, these labs would suggest that you are tolerating this well and we need to continue to watch and slowly taper this so that your body can acclimate to the drop. 2023 wbc 4.4 hg 12.1 platelet 225 and mcv 93 and glu 84 and bun 9 and cr 0.84 and na 141 and k 4.0 and cl 104 and co2 24 and alb 4.2 tb 0.4 and db 0.11 and alk 130 and ast 25 and alt 17. lipase 26. Plan is to drop to 1mg pred and do the labs in 3 and 6 and 12 weeks. [facility] ultrasound shows liver coarsened in echotexture and nodular contour suggesting cirrhosis. No lesions were seen. Important to do this every 6 months for surveillance. Bile ducts not dilated and common bile duct 1.7 mm. Gallbladder normal. Skinner sign negative. Pancreas normal were seen. Right kidney 8 point centimeters with no hydronephrosis. Spleen normal at 9.6 cm. Liver vessels were patent. No ascites seen. In summary they felt she had a cirrhotic liver morphology but no suspicious lesions. Very important to keep your labs as normal as possible for the best chance for this to heal over time. We will plan to redo the scan again in 6 months. Discussed the us with the pt. Explained the issues to her. She does need to look to get the egd done to look for varices. She did not have covid 19 around the time of the u,s. Right now meds radha 300mg tid, aza 50mg po qd, 1mg po qd. Vit d3 and 2000 iu po qd calcium with vit d 3 x week. 3-9-2023 wbc 3.6 hg 12.9 and plat 224 and mcv 91 and neutrophils 2.3 and lymphs 0.9. glu 91 and bun 9 and cr 0.78 an na 141 and k 3.7 and cl 105 and co2 26 and alb 4.3 and tb 0.4 and alk 173 and ast 28 and alt 20. Prior alk 163 so that went up some. Lpase 22 and inr 1.09. She is not planning another. 2022 ultrasound shows liver echogenicity appears increased and with no focal lesions. Common bile duct was normal at 2 mm. Gallbladder appeared normal. No significant ascites was seen. Right kidney was 9.2 cm and no hydronephrosis was seen. Liver vessels were patent. Spleen was normal in size. Overall the liver was echogenic in keeping with possible chronic liver disease but that also could be related to fatty liver. Need to look at your lab patterns. Patent vessels were seen. Long-term we can talk about trying to get an MRI at some point electively to look deeper at this liver issue and sort out fat versus fibrosis better. She stopped breast feeding in 2023 and went for 11 m. 2022 : lipase 22 and normal. Glu 94 and bun 8 and cr 0.60 and na 139 and k 3.9 and cl 103 and co2 23 and ca 9.3 and alb 4.3 and tb 0.3 and alk 147 and down from 190 on sept 15. Ast 26 and alt 19. Prior ast 35 and alt 27. Wbc 5.5 and hg 11.7 and hct 35.8 and plat 206. Mcv 88 and neutrophils 4.2 and lymphs 0.8.  labs showed liver fraction 74%, bone fraction 23% and intestinal fraction 3% so this confirms that the alkaline phosphatase is mainly coming from liver which is the normal pattern. Albumin 4.4 bilirubin 0.4, direct bilirubin 0.6 alkaline phosphatase 165 up from 163 back in  for comparison. AST 29 ALT 20 and previous AST 23 and 16 back on . Sept 15: glu 73 and bun 11 and cr 0.76 and na 142 and k 4.4 and cl 103 and co2 22 and ca 9.6 and total protein 8.0 and alb 4.4 and tb 0.4 and alk 190 (up from 174 and prior 142.) Ast 35 and alt 27. So these are higher. July limited hepatitis alk 163 and ast 23 and alt 16 they were lower. Wbc 4.1 and hg 12.5 and hct 40.0 and mcv 88 and platelets 225. MCHC or mean corpuscular hemoglobin concentration 31.3 little low but up from 29.8 prior. Please share with local provider. Neutrophils 2.8 and lymphs 0.9. Per notes, on aza 50mg po qd and prednisone 2mg po qd and ursodiol 300mg po tid (increased in July). She stopped the fenugreek like medicine (crackers took) a few weeks ago for 2 weeks month ago. [employer] Lactation Cheddar crisps: have turmeric in them. Prior she had started iron pills and taking them less so. 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  labs show alb 4.1 and tb 0.3 and direct bilirubin less than 0.1 and elevated alk phos of 163 and ast 23 and alt 16. [facility] labs alk 174 and ast 34 and alt 31.  ultrasound shows partially visualized pancreas unremarkable. The liver parenchyma however it did appear to be coarsened but with no mass. Gallbladder thin-walled and no stones. Common bile duct normal at 2 mm. Right kidney 8.9 cm with no hydronephrosis. Spleen normal at 9.6 cm. Liver vessels patent. They mention that the coarsening could represent early chronic liver disease. On your last year ultrasound the liver had some coarsening and even mild surface nodularity seen, so even though it does not appear at first glance to be better, the description that they used is milder now. 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  labs show white blood cell count 4.1 hemoglobin 12.1 platelet count 219 MCV 89 neutrophils 1.6 and lymphocytes 1.9. These are all in normal range. Glucose 166 still up and down from 361 last time. BUN of 10 creatinine 0.73 which is now much better than previous 1.45 cr of April. Please share with primary provider. Sodium 142 potassium 4.0 albumin 4.2. Total bilirubin 0.4 alkaline phosphatase 68 and AST normal at 33 down from 55. ALT 28 normal down from 49. Ideal ALT is less than 25. Look forward to seeing you soon. May 2022 Did TH with pt. due to itching will reduce dose of radha to bid dosing. max dose around 900mg. continue pred 2mg for now has been on this since 2022. pt had baby girl on 22,  Dr. Wakefield worked with her in prior pregnancy. She stayed on imuran during pregnancy. At start prednisone 10mg 8/10/21 and radha bid and weaned as per ob.  labs show normal lipase of 28 previously 31. Glucose still slightly up at 106 previously 109 BUN of 11 creatinine 0.68 sodium 136 potassium 4.4 calcium 9.5 albumin 4.6 bilirubin 0.4 alkaline phosphatase 90 AST 35 ALT 25. Prior AST 28 and ALT 17 so slightly higher. Prior alkaline phosphatase 88. White blood cell count 8.4 hemoglobin trending better at 10.9 but still low but better than prior10.8 and prior 10.3. MCV normal 86 platelet count 302 up from 233. Neutrophils normal at 7 lymphocytes 0.8. 2021 u/s: ultrasound shows the partially visualized pancreas to be unremarkable. Liver has a coarsened echotexture to its parenchyma and with mild surface nodularity. No focal mass seen. Gallbladder was thin-walled and the common bile duct was normal at 2 mm. Right kidney measured 8.5 cm with no hydronephrosis. Spleen normal 10 cm. No ascites seen. Liver vessels were patent. They overall felt that your liver parenchyma appeared to have cirrhotic features and with no focal mass. Plan: 1.  Pt will follow course off of the green tea containing peach tea. 2.  Pt will redo labs in 4 and 12 weeks and do telemed. 3. Call for the u.s. 4. Pt anselmo stay healthy. Duration of the visit was  min with 10  min of chart prep reviewing multiple  labs and contacts since last visit and load data and information that was available for the visit and setting up in ecw and then an additional 30 min on face to face time and their clinical correlates and then with this information being used to formulate a treatment plan.

## 2024-11-05 ENCOUNTER — OFFICE VISIT (OUTPATIENT)
Dept: URBAN - METROPOLITAN AREA TELEHEALTH 2 | Facility: TELEHEALTH | Age: 34
End: 2024-11-05
Payer: COMMERCIAL

## 2024-11-05 ENCOUNTER — TELEPHONE ENCOUNTER (OUTPATIENT)
Dept: URBAN - METROPOLITAN AREA CLINIC 86 | Facility: CLINIC | Age: 34
End: 2024-11-05

## 2024-11-05 VITALS — HEIGHT: 62 IN | WEIGHT: 105 LBS | BODY MASS INDEX: 19.32 KG/M2

## 2024-11-05 DIAGNOSIS — K74.02 HEPATIC FIBROSIS, ADVANCED FIBROSIS: ICD-10-CM

## 2024-11-05 DIAGNOSIS — R76.9 ABNORMAL IMMUNOLOGICAL FINDING IN SERUM: ICD-10-CM

## 2024-11-05 DIAGNOSIS — R74.8 ABNORMAL LIVER ENZYMES: ICD-10-CM

## 2024-11-05 DIAGNOSIS — K75.4 AUTOIMMUNE HEPATITIS: ICD-10-CM

## 2024-11-05 DIAGNOSIS — Z71.89 VACCINE COUNSELING: ICD-10-CM

## 2024-11-05 DIAGNOSIS — Z98.890 HISTORY OF LIVER BIOPSY: ICD-10-CM

## 2024-11-05 DIAGNOSIS — Z79.899 HIGH RISK MEDICATION USE: ICD-10-CM

## 2024-11-05 PROCEDURE — 99214 OFFICE O/P EST MOD 30 MIN: CPT

## 2024-11-05 RX ORDER — URSODIOL 250 MG/1
1 TABLET WITH FOOD TABLET ORAL
Qty: 270 | Refills: 1 | Status: ACTIVE | COMMUNITY

## 2024-11-05 RX ORDER — PREDNISONE 1 MG/1
1 TABLET WITH FOOD ON MON AND THURSDAY TABLET ORAL
Qty: 24 | Refills: 1

## 2024-11-05 RX ORDER — URSODIOL 250 MG/1
1 TABLET WITH FOOD TABLET ORAL
Qty: 270 | Refills: 1 | OUTPATIENT

## 2024-11-05 RX ORDER — CALCIUM CARBONATE/VITAMIN D3 500MG-5MCG
1 TABLET WITH MEALS TABLET ORAL
Status: ACTIVE | COMMUNITY

## 2024-11-05 RX ORDER — PREDNISONE 1 MG/1
1 TABLET WITH FOOD ON MON AND THURSDAY TABLET ORAL
Qty: 24 | Refills: 1 | Status: ACTIVE | COMMUNITY

## 2024-11-05 RX ORDER — AZATHIOPRINE 50 MG/1
TAKE 1 TABLET BY MOUTH DAILY TABLET ORAL
Qty: 90 TABLET | Refills: 1 | Status: ACTIVE | COMMUNITY

## 2024-11-05 RX ORDER — FERROUS SULFATE 325(65) MG
1 TABLET TABLET ORAL TIW
Status: ACTIVE | COMMUNITY

## 2024-11-05 RX ORDER — CHOLECALCIFEROL (VITAMIN D3) 50 MCG
1 TABLET CAPSULE ORAL ONCE A DAY
Status: ACTIVE | COMMUNITY

## 2024-11-05 RX ORDER — VALACYCLOVIR 500 MG/1
1 TABLET TABLET, FILM COATED ORAL BID PRN
Status: ACTIVE | COMMUNITY

## 2024-11-05 RX ORDER — AZATHIOPRINE 50 MG/1
TAKE 1 TABLET BY MOUTH DAILY TABLET ORAL
Qty: 90 TABLET | Refills: 1

## 2024-11-05 NOTE — HPI-TODAY'S VISIT:
Pt is a 34 year old female who presents post Aug  2024 visit for follow up of AIH post pregnancy and she delivered 2022.  A copy of the note will be sent to referring providers.  Last u,s was [facility] and not sure we received it   labs showed WBC 4.3 hemoglobin 12.4 MCV 95 and platelet count 185 and neutrophils and lymphocytes normal which is good to see. Glucose was 88 BUN of 13 creatinine 0.61 sodium 138 potassium 3.8 calcium 9.0 bilirubin 0.5 and alk phos normal at 110 and AST 26 and ALT is 16.  Prior September 3 labs showed alk phos 116 AST 23 and ALT 14.  As you recall, earlier in the late summer we suspected that the labs had bumped up because of some green tea exposure and it took a little bit longer than expected but finally settled down.  9/3/24 labs were sent to us. The bilirubin 0.3, alkaline phosphatase 116, AST 23 and ALT 14. Goal for the AST and ALT is less than 25 and happy to see that you are there.   labs showed lipase stable for you at 28 from previous 24 in May. Sugar was a little up at 101 and unclear if you were fasting?  BUN of 7 creatinine 0.63 sodium 139 potassium 4.2 chloride normal at this time at 103 and CO2 of 22. WBC 3.8 hemoglobin 12.5 platelet count 211 MCV 94 with normal neutrophils and lymphocytes. Total protein 7.3 albumin 4.1 bilirubin 0.4 and direct bili 0.13 and these are normal.  Alk phos continues to drop now down to 128 from 132 and prior 149.  AST down to 23 and ALT down to 14 and this had come down from previous AST 27 and ALT 14 on prior AST 28 and ALT 19.  Right now she is on ursodio 750mg a day and aza 50mg po qd.  August 15 labs showed total protein 7.3 albumin 4 point bilirubin 0.5 and direct bili 0.14. Alk phos still elevated but down to 132 from 148 before.  AST was 20 and ALT of 14 and these are little bit lower.   labs show albumin normal at 4.5, bilirubin normal at 0.4 and direct bilirubin 0.11 stable. Your alk phos however was up at 149 and had been normal at 111 back in . AST was 28 and ALT of 19 and previously AST 21 and ALT 10. Per notes, you have been on prednisone 1 mg twice a week ursodiol 750 mg a day aza 50 mg a day and not changed as of last visit. No injury of this. Peach tea may have green tea.    labs show total protein 7.2 and alb 4.1 and tb 0.3 and direct bili 0.11 and alk 118 and ast 21 and alt 10.  May 10 alk 95 and ast 20 and alt 9.  May 24 labs show glucose 94, BUN of 11 creatinine 0.7 sodium 140 potassium 4.2 chloride 104 CO2 of 24 albumin 4.1 bilirubin 0.4 and alkaline phosphatase slightly higher at 126 just above the normal range. AST was 34 and ALT of 17 with ideal alt less than 25. WBC 5.4 normal hemoglobin 12.6 platelet count 184 MCV 94 with normal neutrophils and lymphocytes.  Per notes you are on the prednisone 1 mg twice a week and the ursodiol 750 mg total a day and the Imuran 50 mg a day.  The alkaline phosphatase could go up slightly due to potential bone injury source such as a recent trauma or could be nonspecific lab variation.  May 10 labs show wbc little low 3.2 and normal prior. Hg 11.9 and hct 36.1. Platelet count 207. MCV 95. Neutrophils were normal at 1.9 and lymphocytes 1.0 so that was good to see. Per last notes from  Lipase was 24 normal, glucose of 80 BUN of 12 creatinine 0.67 sodium 143 potassium 4.1 chloride a little up at 109 CO2 of 23 normal. Albumin 3.9, bilirubin 0.3, direct bili 0.12 alk phos 95 AST 20 ALT of 9 and previously AST 22 and ALT of 12.  Per  note, it mentions that you are on the prednisone 1 mg twice a week and we were following the labs on that lower dose.  She has been on that dose now for 6 weeks.   labs show albumin 4.5, bilirubin 0.3, direct bilirubin 0.1 alk phos 108 AST 22 and ALT 12 which are all in the normal range with ideal ALT less than 25.   total protein 7.3 and albumin 4.4 and total bili 0.4 and direct bili 0.11 and alk 119 and ast 20 and alt 9. Prior alk 101 and ast 22 and alt 12.   labs showed lipase normal at 16 and down from 23 back on . Total protein 7.0 albumin 4.1 bilirubin 0.7 and direct bilirubin 0.18. These are all normal range. Your alk phos was 101 slightly up from 95 but still normal. AST down to 22 and ALT of 12 and previously AST 25 and ALT 13. Per the notes from  we had mentioned that if your labs continue to settle out and we would consider making some changes. At this point we have you listed as being on ursodiol 750 mg total a day, the Imuran 50 mg total a day and the prednisone at only 1 mg 3 times a week. At this point if you are feeling comfortable we can proceed to drop that to 1 mg of the prednisone twice a week such as Monday and Thursday. If so then I would recommend you repeat your labs then in 2 weeks and then again in 6 weeks and then again before your visit in May.   labs show lipase 23 and normal.Total protein 7.0 albumin 4.2 total bilirubin 0.3 direct bilirubin 0.1 alk phos 95 AST 25 ALT 13 and these are all back to normal range. As you recall your  labs showed an alk phos of 118 AST of 41 and ALT of 20 I believe you told me you had been ill recently at the time. Good to see that this is now stabilized. I was looking at your meds and you are listed as being on a total of 750 mg of ursodiol a day, Imuran 50 mg a day and the prednisone only at 1 mg 3 times a week and I would go slow at trying to drop this any further. If you redo your labs again in another month and they remain normal then I will be more willing to consider the drop then at that point.   : lipase 21 and down from 38 in  and prior dec lipase 84. Glucose 93 and bun 13 and cr 0.7 and na 136 and k 3.6 and cl 101 and co2 21 and ca 9.0 and alb 4.4 and tb 0.3 and alk 118 and ast 41 elevated and prior 27 and alt 20 up from 16 so likely from the URI that you have. Wbc 4.2 and hg 11.9 ad hct 35.5 and platelets 206 and mcv 91. Neutrophils 3.3 normal but lymphs low at 0.3 and could be due to viral issues. Normal prior. I would do as we planned and let labs settle down and redo them in a month and see where you are tehn and follow course.   2024 lipase 38 and down from 84 spike in dec and close to prior 29. Glucose 108 elevated and bun 9 and cr 0.63 and na 141 and k 3.9 and cl 105 and co2 23 and ca 9.0 and alb 4.0 and tb 0.4 and alk 115 and ast 27 and alt 16. Prior alk 105 and ast 21 and alt 14. Ideal alt less than 25. Wbc 3.7 and hg 11.9 and hct 34.9 and platelets 198. mcv 91.Neutrophils 2.2 and lymphs 1.1.   2023 labs show an isolated lipase elevation of 84 which is above the normal range of 14-72 and you previously had been 29 back in October and 38 in November. Have you been ill recently as that can sometimes cause this particular if he had a gastrointestinal process? Sugar was 85 BUN is 7 creatinine 0.71 sodium 139 potassium 4.1 albumin 4.3 bilirubin 0.4 alk phos 105 AST 21 ALT 14 send no liver labs really to suggest any issues going on. WBC 4.2 hemoglobin 12.1 platelet count 201 MCV 92 with neutrophils normal at 2.8 lymphocytes 0.9. Called pt to see what may have happened of late. She says 2 -3 weeks ago had upper respiratory issues and did not see anyone and resolved. Will plan to redo the labs in 2 weeks and see how doing.    labs showed WBC 4.6 hemoglobin 11.4 platelet count 198 MCV 93 with normal neutrophils and lymphocytes. Glucose 79 BUN of 12 creatinine 0.69 sodium 142 and potassium 3.7 and these are normal. Chloride was slightly up at 107 which is nonspecific. Calcium 9.0 albumin 4.1 bilirubin 0.6 and alk phos normal now at 114 which is good to see. AST of 26 and ALT of 20 with prior AST of 24 and ALT of 10 so approximately the same in the AST but definitely a little higher on the ALT. Ideal  ALT less than 25. Lipase was normal slightly higher at 38.  She says had a little cold at the time.   labs showed lipase 29 glucose 92 BUN of 9 creatinine 0.68 sodium 139 potassium 3.5 calcium 9.3 bilirubin 0.6 alk phos 124 which is slightly higher than before when it was 122.  Normal is from 44 up to 121.AST 24 and ALT 10. Ideal alt is less than 25.When I see the alkaline phosphatase remains only slightly up such as this, I wonder about your vitamin D level.  Please check with primary provider as that may be a little low and that could potentially be explaining the alkaline phosphatase being slightly up due to increased bone turnover from. Could also be due to slight ursodiol dose correction for weight last time.WBC 4.8, hemoglobin 12.1 platelet count 187 MCV 90 with normal neutrophils 3.4 and lymphocytes 1.0.Per notes last change was ursodiol dose slight adjustment to 250mg three times a day. Given increase alk phos would redo labs again previsit in Dec and see if the labs continue to settle   She is currently ursodiol 750mg a day and imuran 50mg a day and prednisone 1mg TIW.  August 3 MRI shows lower thorax with limited images to the lung bases being unremarkable. Liver with no significant fat or iron deposition.  Mild heterogeneity seen of the liver.  Liver appeared to be mildly small in size. There were no definite morphologic changes of advanced cirrhosis.  There were no suspicious liver lesions. The gallbladder was present and contains some sludge or concentrated bile within it.   No bile duct dilation seen. Spleen was normal at 8.9 cm. No dilation seen in the main pancreatic duct and no suspicious lesions. Adrenal glands normal. Kidneys showed no hydronephrosis or focal renal lesion. There was no bowel obstruction sign seen and incidentally noted gastric diverticulum is seen along the posterior aspect of the gastric fundus measuring 2.2 x 1.9 cm. She has not done an egd and did see Dr Iniguez re this. No recall of ulcer. Mildly prominent lymph nodes were seen around the liver.  They were felt to be nonspecific possibly reactive. Liver vessels were patent.  I do suspect however there was some reflux of contrast material into the left gonadal vein. Small volume fluid was seen in the pelvis which I thought was possibly physiologic which is there with a live possibly this was near the time of your cycle. In summary, mild nonspecific heterogeneous changes seen of the liver with no definite changes of cirrhosis.  No splenomegaly seen and no ascites.  Liver vessels patent.  This is good to see and it confirms that the liver probably does have some scarring changes because of your liver disease history but the good news is that there are no signs of any advanced cirrhosis which was the concern.   labs show lipase normal at 31, glucose normal at 87 BUN of 11 creatinine 0.73 sodium 140 potassium 4.0 calcium 9.3 albumin 4.5 bilirubin 0.3 which is down from 0.5. Good to see the alk phos be almost normal now at 122 from previous 128 and prior 173.  Very happy to see that it is almost normal.  AST 27 and ALT 16 which are doing well.  Ideal ALT less than 25. WBC 4.8 hemoglobin 12.3 platelet count 156 MCV 92 with normal neutrophils and lymphocytes. At our last contact on , it states that you are down to 1 mg a day and so that is good to see that this remains stable.  it is very important to taper these very low doses of steroids slowly as we are doing.  Please do the monitoring labs again as we discussed before the next visit in September.   labs show lipase normal at 18, sugar a little elevated at 114.  Had you been fasting that day?  Please share with local providers. BUN of 7 creatinine 0.71 sodium 139 potassium 3.8 albumin 4.4 bili 0.5 and alk phos almost normal now at 128.  Normal is less than 121.  Previously in March it had been 173 for comparison.  AST 25 and ALT 20 with ideal ALT less than 25. WBC 5 hemoglobin 12.3 plate count 208 MCV 92 and normal neutrophils and lymphocytes.  She is 1mg a day of steroid q other day and on that 2023.  Regarding today's labs, the note from [facility] mentions how you had cut back steroids down to 1 mg a day now. If so, these labs would suggest that you are tolerating this well and we need to continue to watch and slowly taper this so that your body can acclimate to the drop.   2023 wbc 4.4 hg 12.1 platelet 225 and mcv 93 and glu 84 and bun 9 and cr 0.84 and na 141 and k 4.0 and cl 104 and co2 24 and alb 4.2 tb 0.4 and db 0.11 and alk 130 and ast 25 and alt 17. lipase 26.  Plan is to drop to 1mg pred and do the labs in 3 and 6 and 12 weeks.  [facility] ultrasound shows liver coarsened in echotexture and nodular contour suggesting cirrhosis.  No lesions were seen.  Important to do this every 6 months for surveillance. Bile ducts not dilated and common bile duct 1.7 mm. Gallbladder normal.  Skinner sign negative. Pancreas normal were seen. Right kidney 8 point centimeters with no hydronephrosis.  Spleen normal at 9.6 cm. Liver vessels were patent. No ascites seen. In summary they felt she had a cirrhotic liver morphology but no suspicious lesions. Very important to keep your labs as normal as possible for the best chance for this to heal over time. We will plan to redo the scan again in 6 months. Discussed the us with the pt. Explained the issues to her. She does need to look to get the egd done to look for varices.  She did not have covid 19 around the time of the u,s.  Right now meds radha 300mg tid, aza 50mg po qd, 1mg po qd.  Vit d3 and 2000 iu po qd calcium with vit d 3 x week.  3-9-2023 wbc 3.6 hg 12.9 and plat 224 and mcv 91 and neutrophils 2.3 and lymphs 0.9.  glu 91 and bun 9 and cr 0.78 an na 141 and k 3.7 and cl 105 and co2 26 and alb 4.3 and tb 0.4 and alk 173 and  ast 28 and alt 20.  Prior alk 163 so that went up some. Lpase 22 and inr 1.09.  She is not planning another.  2022 ultrasound shows liver echogenicity appears increased and with no focal lesions. Common bile duct was normal at 2 mm. Gallbladder appeared normal. No significant ascites was seen. Right kidney was 9.2 cm and no hydronephrosis was seen. Liver vessels were patent. Spleen was normal in size. Overall the liver was echogenic in keeping with possible chronic liver disease but that also could be related to fatty liver. Need to look at your lab patterns. Patent vessels were seen. Long-term we can talk about trying to get an MRI at some point electively to look deeper at this liver issue and sort out fat versus fibrosis better.  She stopped breast feeding in 2023 and went for 11 m.  2022 : lipase 22 and normal. Glu 94 and bun 8 and cr 0.60 and na 139 and k 3.9 and cl 103 and co2 23 and ca 9.3 and alb 4.3 and tb 0.3 and alk 147 and down from 190 on sept 15. Ast 26 and alt 19. Prior ast 35 and alt 27. Wbc 5.5 and hg 11.7 and hct 35.8 and plat 206. Mcv 88 and neutrophils 4.2 and lymphs 0.8.    labs showed liver fraction 74%, bone fraction 23% and intestinal fraction 3% so this confirms that the alkaline phosphatase is mainly coming from liver which is the normal pattern. Albumin 4.4 bilirubin 0.4, direct bilirubin 0.6 alkaline phosphatase 165 up from 163 back in  for comparison.  AST 29 ALT 20 and previous AST 23 and 16 back on .  Sept 15: glu 73 and bun 11 and cr 0.76 and na 142 and k 4.4 and cl 103 and co2 22 and ca 9.6 and total protein 8.0 and alb 4.4 and tb 0.4 and alk 190 (up from 174 and prior 142.) Ast 35 and alt 27. So these are higher.  July limited hepatitis alk 163 and ast 23 and alt 16 they were lower.  Wbc 4.1 and hg 12.5 and hct 40.0 and mcv 88 and platelets 225.  MCHC or mean corpuscular hemoglobin concentration 31.3 little low but up from 29.8 prior. Please share with local provider. Neutrophils 2.8 and lymphs 0.9.   Per notes, on aza 50mg po qd and prednisone 2mg po qd and ursodiol 300mg po tid (increased in July).  She stopped the fenugreek like medicine (crackers took) a few weeks ago for 2 weeks month ago. [employer] Lactation Cheddar crisps: have turmeric in them.  Prior she had started iron pills and taking them less so.          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   labs show alb 4.1 and tb 0.3 and direct bilirubin less than 0.1 and elevated alk phos of 163 and ast 23 and alt 16.  [facility] labs alk 174 and ast 34 and alt 31.   ultrasound shows partially visualized pancreas unremarkable. The liver parenchyma however it did appear to be coarsened but with no mass. Gallbladder thin-walled and no stones. Common bile duct normal at 2 mm.  Right kidney 8.9 cm with no hydronephrosis.  Spleen normal at 9.6 cm. Liver vessels patent. They mention that the coarsening could represent early chronic liver disease.  On your last year ultrasound the liver had some coarsening and even mild surface nodularity seen, so even though it does not appear at first glance to be better, the description that they used is milder now. 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   labs show white blood cell count 4.1 hemoglobin 12.1 platelet count 219 MCV 89 neutrophils 1.6 and lymphocytes 1.9.  These are all in normal range. Glucose 166 still up and down from 361 last time.  BUN of 10 creatinine 0.73 which is now much better than previous 1.45 cr of April.  Please share with primary provider. Sodium 142 potassium 4.0 albumin 4.2.  Total bilirubin 0.4 alkaline phosphatase 68 and AST normal at 33 down from 55.  ALT 28 normal down from 49.  Ideal ALT is less than 25. Look forward to seeing you soon.  May 2022 Did TH with pt. due to itching will reduce dose of radha to bid dosing. max dose around 900mg. continue pred 2mg for now has been on this since 2022.  pt had baby girl on 22,   Dr. Wakefield worked with her in prior pregnancy.  She stayed on imuran during pregnancy. At start prednisone 10mg 8/10/21 and radha bid and weaned as per ob.     labs show normal lipase of 28 previously 31.  Glucose still slightly up at 106 previously 109 BUN of 11 creatinine 0.68 sodium 136 potassium 4.4 calcium 9.5 albumin 4.6 bilirubin 0.4 alkaline phosphatase 90 AST 35 ALT 25.  Prior AST 28 and ALT 17 so slightly higher.  Prior alkaline phosphatase 88.   White blood cell count 8.4 hemoglobin trending better at 10.9 but still low but better than prior10.8 and prior 10.3.  MCV normal 86 platelet count 302 up from 233.  Neutrophils normal at 7 lymphocytes 0.8.   2021 u/s: ultrasound shows the partially visualized pancreas to be unremarkable.  Liver has a coarsened echotexture to its parenchyma and with mild surface nodularity.  No focal mass seen. Gallbladder was thin-walled and the common bile duct was normal at 2 mm.  Right kidney measured 8.5 cm with no hydronephrosis.  Spleen normal 10 cm.  No ascites seen.  Liver vessels were patent. They overall felt that your liver parenchyma appeared to have cirrhotic features and with no focal mass.  Plan: 1.  Pt still missing u.s from [facility] and she did there and used to go to [place].  2.  Pt will do labs in 3m and then can consider a drop in the prednisone as coming off green tea now. 3.  Needs to redo the u.s next time. 4,  [facility] limited u.s next time 180 cash pay. 5.  Pt will stay on meds for now 6.  Staying off green tea. 7.  She has not decided if she plans to have another child.   Duration of the visit was 38 min with 10  min of chart prep reviewing multiple labs and contacts since last visit and load data and information that was available for the visit and setting up in ecw and then an additional 28 min for the [facility] telemed visit with tie spent  reviewing labs and tests and cinical correlates and then with this information being used to formulate a treatment plan.
no

## 2024-11-05 NOTE — HPI-TODAY'S VISIT:
Dear Paty Combs,  Was very happy to see that the ultrasound from [facility] from July 3 was finally sent in.    The ultrasound showed the liver, spleen, visualized pancreas, gallbladder appeared unremarkable with the common bile duct.  Normal at 3.9 mm.  Right kidney was slightly small at 8.8 cm and left kidney was normal size at 7.3 cm.  No renal stones were seen no hydronephrosis to kidneys was seen.  Visualized abdominal aorta and IVC was unremarkable.    Overall they felt you had an unremarkable exam except for a slightly small right kidney.    Your comparison that we have is unfortunately from an older MRI from August of last year that showed at that timeframe that the liver had no significant fat or iron but was mildly heterogeneous and they obviously are not seen that on their ultrasound.  The spleen was normal back then and they mentioned that the kidneys had no hydronephrosis or stones but they did not mention that the size of the right kidney appeared to be somewhat smaller in size vs the other so that makes me wonder how well was the kidney seen on their u.s.   We need to try to see if we can get back to doing ultrasound either through [facility] to have that continuity of care again or establish with another provider like [facility] like we talked about as the other place did not have any comparisons and the reading like some of the details that were are accustomed to see in the descriptors.    Typically after a few ultrasounds at the same place they tend to get a little bit better on the readings as they get more familiar with you and what is normal and what is not normal for you.  Dr. Isbell

## 2024-11-15 ENCOUNTER — TELEPHONE ENCOUNTER (OUTPATIENT)
Dept: URBAN - METROPOLITAN AREA CLINIC 86 | Facility: CLINIC | Age: 34
End: 2024-11-15

## 2024-11-15 NOTE — HPI-TODAY'S VISIT:
Beverley Combs,   November 13 ultrasound shows pancreas not well-seen due to gas. Abdominal aorta and inferior vena cava were normal in caliber were seen. Liver was 10.9 cm with some large portions of the liver not seen due to overlying gas.  Within these confines, the liver was normal in heterogeneity and homogeneous/even in echotexture. Main portal vein was patent with appropriate directional flow.  Right kidney 6.7 cm in length. No hydronephrosis seen. Common bile duct was 4 mm. Gallbladder not distended and with no thickening.   Overall no sonographic abnormality seen but the exam was somewhat limited as mentioned. Dr. Isbell

## 2024-12-07 ENCOUNTER — TELEPHONE ENCOUNTER (OUTPATIENT)
Dept: URBAN - METROPOLITAN AREA CLINIC 86 | Facility: CLINIC | Age: 34
End: 2024-12-07

## 2024-12-07 LAB
ALBUMIN: 4.2
ALKALINE PHOSPHATASE: 115
ALT (SGPT): 20
AST (SGOT): 32
BILIRUBIN, DIRECT: 0.22
BILIRUBIN, TOTAL: 0.5
PROTEIN, TOTAL: 7.5

## 2024-12-07 NOTE — HPI-TODAY'S VISIT:
Dear Yonis Combs, Dec 6 labs show all of your liver labs are normal with a total protein 7.5 albumin 4.2 bilirubin 0.5 direct 0.2 to alk phos 115 AST 32 ALT of 20. Back in August your alk phos was 128 which was elevated AST 23 and ALT 14.   As you recall your October 30 labs also likewise had shown an alk phos normal at 110 AST of 26 and ALT of 16 so these labs are very close to those.   Good to see that your labs are now normal again and staying so off of the green tea which is what we suspected had caused flare.   Once you are a little bit further out from this episode then we can talk about making any further med adjustments as we need to go slow as many times coming off of a drug-induced liver injury you are little bit more susceptible and this may not be the best time to try to do that.  You are not on much of the medicines already so we have to be especially careful with that. Dr. Isbell

## 2024-12-09 ENCOUNTER — LAB OUTSIDE AN ENCOUNTER (OUTPATIENT)
Dept: URBAN - METROPOLITAN AREA TELEHEALTH 2 | Facility: TELEHEALTH | Age: 34
End: 2024-12-09

## 2024-12-18 ENCOUNTER — WEB ENCOUNTER (OUTPATIENT)
Dept: URBAN - METROPOLITAN AREA CLINIC 86 | Facility: CLINIC | Age: 34
End: 2024-12-18

## 2025-02-03 ENCOUNTER — LAB OUTSIDE AN ENCOUNTER (OUTPATIENT)
Dept: URBAN - METROPOLITAN AREA TELEHEALTH 2 | Facility: TELEHEALTH | Age: 35
End: 2025-02-03

## 2025-02-06 ENCOUNTER — TELEPHONE ENCOUNTER (OUTPATIENT)
Dept: URBAN - METROPOLITAN AREA CLINIC 86 | Facility: CLINIC | Age: 35
End: 2025-02-06

## 2025-02-06 ENCOUNTER — OFFICE VISIT (OUTPATIENT)
Dept: URBAN - METROPOLITAN AREA TELEHEALTH 2 | Facility: TELEHEALTH | Age: 35
End: 2025-02-06

## 2025-02-06 VITALS — HEIGHT: 62 IN | BODY MASS INDEX: 19.32 KG/M2 | WEIGHT: 105 LBS

## 2025-02-06 DIAGNOSIS — K75.4 AUTOIMMUNE HEPATITIS: ICD-10-CM

## 2025-02-06 DIAGNOSIS — R74.8 ABNORMAL LIVER ENZYMES: ICD-10-CM

## 2025-02-06 DIAGNOSIS — K74.02 HEPATIC FIBROSIS, ADVANCED FIBROSIS: ICD-10-CM

## 2025-02-06 DIAGNOSIS — Z79.899 HIGH RISK MEDICATION USE: ICD-10-CM

## 2025-02-06 DIAGNOSIS — Z71.89 VACCINE COUNSELING: ICD-10-CM

## 2025-02-06 DIAGNOSIS — Z98.890 HISTORY OF LIVER BIOPSY: ICD-10-CM

## 2025-02-06 DIAGNOSIS — R76.9 ABNORMAL IMMUNOLOGICAL FINDING IN SERUM: ICD-10-CM

## 2025-02-06 LAB
ALBUMIN: 4.3
ALKALINE PHOSPHATASE: 112
ALT (SGPT): 17
AST (SGOT): 25
BASO (ABSOLUTE): 0
BASOS: 1
BILIRUBIN, TOTAL: 0.6
BUN/CREATININE RATIO: 16
BUN: 10
CALCIUM: 9.3
CARBON DIOXIDE, TOTAL: 24
CHLORIDE: 106
CREATININE: 0.63
EGFR: 118
EOS (ABSOLUTE): 0.1
EOS: 3
GLOBULIN, TOTAL: 2.9
GLUCOSE: 89
HEMATOCRIT: 37.2
HEMATOLOGY COMMENTS:: (no result)
HEMOGLOBIN: 12.3
IMMATURE CELLS: (no result)
IMMATURE GRANS (ABS): 0
IMMATURE GRANULOCYTES: 0
LIPASE: 27
LYMPHS (ABSOLUTE): 0.8
LYMPHS: 24
MCH: 31.1
MCHC: 33.1
MCV: 94
MONOCYTES(ABSOLUTE): 0.3
MONOCYTES: 8
NEUTROPHILS (ABSOLUTE): 2
NEUTROPHILS: 64
NRBC: (no result)
PLATELETS: 215
POTASSIUM: 4.5
PROTEIN, TOTAL: 7.2
RBC: 3.96
RDW: 12
SODIUM: 140
WBC: 3.1

## 2025-02-06 PROCEDURE — 99215 OFFICE O/P EST HI 40 MIN: CPT

## 2025-02-06 RX ORDER — PREDNISONE 1 MG/1
1 TABLET WITH FOOD ONCE A WEEK (NEW DOSE) TABLET ORAL
Qty: 12 | Refills: 1

## 2025-02-06 RX ORDER — PREDNISONE 1 MG/1
2 TABLETS WITH FOOD OR MILK TABLET ORAL
Refills: 1 | Status: ACTIVE | COMMUNITY

## 2025-02-06 RX ORDER — CHOLECALCIFEROL (VITAMIN D3) 50 MCG
1 TABLET CAPSULE ORAL ONCE A DAY
Status: ACTIVE | COMMUNITY

## 2025-02-06 RX ORDER — VALACYCLOVIR 500 MG/1
1 TABLET TABLET, FILM COATED ORAL BID PRN
Status: ACTIVE | COMMUNITY

## 2025-02-06 RX ORDER — AZATHIOPRINE 50 MG/1
TAKE 1 TABLET BY MOUTH DAILY TABLET ORAL
Qty: 90 TABLET | Refills: 1 | Status: ACTIVE | COMMUNITY

## 2025-02-06 RX ORDER — URSODIOL 250 MG/1
1 TABLET WITH FOOD TABLET ORAL
Qty: 270 | Refills: 1 | Status: ACTIVE | COMMUNITY

## 2025-02-06 RX ORDER — URSODIOL 250 MG/1
1 TABLET WITH FOOD TABLET ORAL
Qty: 270 | Refills: 1 | OUTPATIENT

## 2025-02-06 RX ORDER — AZATHIOPRINE 50 MG/1
TAKE 1 TABLET BY MOUTH DAILY TABLET ORAL
Qty: 90 TABLET | Refills: 1

## 2025-02-06 RX ORDER — CALCIUM CARBONATE/VITAMIN D3 500MG-5MCG
1 TABLET WITH MEALS TABLET ORAL
Status: ACTIVE | COMMUNITY

## 2025-02-06 RX ORDER — FERROUS SULFATE 325(65) MG
1 TABLET TABLET ORAL TIW
Status: ACTIVE | COMMUNITY

## 2025-02-06 NOTE — HPI-TODAY'S VISIT:
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  A copy of the note will be sent to referring providers.  Dec 6 labs show all of your liver labs are normal with a total protein 7.5 albumin 4.2 bilirubin 0.5 direct 0.2 to alk phos 115 AST 32 ALT of 20. Back in August your alk phos was 128 which was elevated AST 23 and ALT 14.  As you recall your  labs also likewise had shown an alk phos normal at 110 AST of 26 and ALT of 16 so these labs are very close to those.  Stopped the green tea which is what we suspected had caused flare. No matcha either. Not doing energy drinks.  Need to check the labs to be sure. She did labs yesterday at [facility] and [facility] is calling and did get.   wbc 3.1 little low and oct 4.3. hg 12.3 and hct 37.2 and mcv 94 and platelets 215 and neutrophils 2.0 and lymphs 0.8 and glu 89 and bun 10 and cr 0.63 and na 140 and k 4.5 and cl 106 and  co2 24 and ca 9.3 and alb 4.3 and tb 0.6 and alk 112 and ast 25 and alt 17. Lipase 27.  So these labs are closer top baseline.   Dec 6 alk 115 and ast 32 and alt 20.   2024 ultrasound shows pancreas not well-seen due to gas. Abdominal aorta and inferior vena cava were normal in caliber were seen. Liver was 10.9 cm with some large portions of the liver not seen due to overlying gas. Within these confines, the liver was normal in heterogeneity and homogeneous/even in echotexture. Main portal vein was patent with appropriate directional flow. Right kidney 6.7 cm in length. No hydronephrosis seen. Common bile duct was 4 mm. Gallbladder not distended and with no thickening.  Overall no sonographic abnormality seen but the exam was somewhat limited as mentioned.   July 3 was finally sent in. The ultrasound showed the liver, spleen, visualized pancreas, gallbladder appeared unremarkable with the common bile duct. Normal at 3.9 mm. Right kidney was slightly small at 8.8 cm and left kidney was normal size at 7.3 cm. No renal stones were seen no hydronephrosis to kidneys was seen. Visualized abdominal aorta and IVC was unremarkable. Overall they felt you had an unremarkable exam except for a slightly small right kidney.    Your comparison that we have is unfortunately from an older MRI from August of last year that showed at that timeframe that the liver had no significant fat or iron but was mildly heterogeneous and they obviously are not seen that on their ultrasound. The spleen was normal back then and they mentioned that the kidneys had no hydronephrosis or stones but they did not mention that the size of the right kidney appeared to be somewhat smaller in size vs the other so that makes me wonder how well was the kidney seen on their u.s.   We need to try to see if we can get back to doing ultrasound either through [facility] to have that continuity of care again or establish with another provider like [facility] like we talked about as the other place did not have any comparisons and the reading like some of the details that were are accustomed to see in the descriptors.   labs showed WBC 4.3 hemoglobin 12.4 MCV 95 and platelet count 185 and neutrophils and lymphocytes normal which is good to see. Glucose was 88 BUN of 13 creatinine 0.61 sodium 138 potassium 3.8 calcium 9.0 bilirubin 0.5 and alk phos normal at 110 and AST 26 and ALT is 16.  Prior September 3 labs showed alk phos 116 AST 23 and ALT 14.  As you recall, earlier in the late summer we suspected that the labs had bumped up because of some green tea exposure and it took a little bit longer than expected but finally settled down.  9/3/24 labs were sent to us. The bilirubin 0.3, alkaline phosphatase 116, AST 23 and ALT 14. Goal for the AST and ALT is less than 25 and happy to see that you are there.   labs showed lipase stable for you at 28 from previous 24 in May. Sugar was a little up at 101 and unclear if you were fasting?  BUN of 7 creatinine 0.63 sodium 139 potassium 4.2 chloride normal at this time at 103 and CO2 of 22. WBC 3.8 hemoglobin 12.5 platelet count 211 MCV 94 with normal neutrophils and lymphocytes. Total protein 7.3 albumin 4.1 bilirubin 0.4 and direct bili 0.13 and these are normal.  Alk phos continues to drop now down to 128 from 132 and prior 149.  AST down to 23 and ALT down to 14 and this had come down from previous AST 27 and ALT 14 on prior AST 28 and ALT 19.  Right now she is on ursodio 750mg a day and aza 50mg po qd.  August 15 labs showed total protein 7.3 albumin 4 point bilirubin 0.5 and direct bili 0.14. Alk phos still elevated but down to 132 from 148 before.  AST was 20 and ALT of 14 and these are little bit lower.   labs show albumin normal at 4.5, bilirubin normal at 0.4 and direct bilirubin 0.11 stable. Your alk phos however was up at 149 and had been normal at 111 back in . AST was 28 and ALT of 19 and previously AST 21 and ALT 10. Per notes, you have been on prednisone 1 mg twice a week ursodiol 750 mg a day aza 50 mg a day and not changed as of last visit. No injury of this. Peach tea may have green tea.    labs show total protein 7.2 and alb 4.1 and tb 0.3 and direct bili 0.11 and alk 118 and ast 21 and alt 10.  May 10 alk 95 and ast 20 and alt 9.  May 24 labs show glucose 94, BUN of 11 creatinine 0.7 sodium 140 potassium 4.2 chloride 104 CO2 of 24 albumin 4.1 bilirubin 0.4 and alkaline phosphatase slightly higher at 126 just above the normal range. AST was 34 and ALT of 17 with ideal alt less than 25. WBC 5.4 normal hemoglobin 12.6 platelet count 184 MCV 94 with normal neutrophils and lymphocytes.  Per notes you are on the prednisone 1 mg twice a week and the ursodiol 750 mg total a day and the Imuran 50 mg a day.  The alkaline phosphatase could go up slightly due to potential bone injury source such as a recent trauma or could be nonspecific lab variation.  May 10 labs show wbc little low 3.2 and normal prior. Hg 11.9 and hct 36.1. Platelet count 207. MCV 95. Neutrophils were normal at 1.9 and lymphocytes 1.0 so that was good to see. Per last notes from  Lipase was 24 normal, glucose of 80 BUN of 12 creatinine 0.67 sodium 143 potassium 4.1 chloride a little up at 109 CO2 of 23 normal. Albumin 3.9, bilirubin 0.3, direct bili 0.12 alk phos 95 AST 20 ALT of 9 and previously AST 22 and ALT of 12.  Per  note, it mentions that you are on the prednisone 1 mg twice a week and we were following the labs on that lower dose.  She has been on that dose now for 6 weeks.   labs show albumin 4.5, bilirubin 0.3, direct bilirubin 0.1 alk phos 108 AST 22 and ALT 12 which are all in the normal range with ideal ALT less than 25.   total protein 7.3 and albumin 4.4 and total bili 0.4 and direct bili 0.11 and alk 119 and ast 20 and alt 9. Prior alk 101 and ast 22 and alt 12.   labs showed lipase normal at 16 and down from 23 back on . Total protein 7.0 albumin 4.1 bilirubin 0.7 and direct bilirubin 0.18. These are all normal range. Your alk phos was 101 slightly up from 95 but still normal. AST down to 22 and ALT of 12 and previously AST 25 and ALT 13. Per the notes from  we had mentioned that if your labs continue to settle out and we would consider making some changes. At this point we have you listed as being on ursodiol 750 mg total a day, the Imuran 50 mg total a day and the prednisone at only 1 mg 3 times a week. At this point if you are feeling comfortable we can proceed to drop that to 1 mg of the prednisone twice a week such as Monday and Thursday. If so then I would recommend you repeat your labs then in 2 weeks and then again in 6 weeks and then again before your visit in May.   labs show lipase 23 and normal.Total protein 7.0 albumin 4.2 total bilirubin 0.3 direct bilirubin 0.1 alk phos 95 AST 25 ALT 13 and these are all back to normal range. As you recall your  labs showed an alk phos of 118 AST of 41 and ALT of 20 I believe you told me you had been ill recently at the time. Good to see that this is now stabilized. I was looking at your meds and you are listed as being on a total of 750 mg of ursodiol a day, Imuran 50 mg a day and the prednisone only at 1 mg 3 times a week and I would go slow at trying to drop this any further. If you redo your labs again in another month and they remain normal then I will be more willing to consider the drop then at that point.   : lipase 21 and down from 38 in  and prior dec lipase 84. Glucose 93 and bun 13 and cr 0.7 and na 136 and k 3.6 and cl 101 and co2 21 and ca 9.0 and alb 4.4 and tb 0.3 and alk 118 and ast 41 elevated and prior 27 and alt 20 up from 16 so likely from the URI that you have. Wbc 4.2 and hg 11.9 ad hct 35.5 and platelets 206 and mcv 91. Neutrophils 3.3 normal but lymphs low at 0.3 and could be due to viral issues. Normal prior. I would do as we planned and let labs settle down and redo them in a month and see where you are tehn and follow course.   2024 lipase 38 and down from 84 spike in dec and close to prior 29. Glucose 108 elevated and bun 9 and cr 0.63 and na 141 and k 3.9 and cl 105 and co2 23 and ca 9.0 and alb 4.0 and tb 0.4 and alk 115 and ast 27 and alt 16. Prior alk 105 and ast 21 and alt 14. Ideal alt less than 25. Wbc 3.7 and hg 11.9 and hct 34.9 and platelets 198. mcv 91.Neutrophils 2.2 and lymphs 1.1.   2023 labs show an isolated lipase elevation of 84 which is above the normal range of 14-72 and you previously had been 29 back in October and 38 in November. Have you been ill recently as that can sometimes cause this particular if he had a gastrointestinal process? Sugar was 85 BUN is 7 creatinine 0.71 sodium 139 potassium 4.1 albumin 4.3 bilirubin 0.4 alk phos 105 AST 21 ALT 14 send no liver labs really to suggest any issues going on. WBC 4.2 hemoglobin 12.1 platelet count 201 MCV 92 with neutrophils normal at 2.8 lymphocytes 0.9. Called pt to see what may have happened of late. She says 2 -3 weeks ago had upper respiratory issues and did not see anyone and resolved. Will plan to redo the labs in 2 weeks and see how doing.    labs showed WBC 4.6 hemoglobin 11.4 platelet count 198 MCV 93 with normal neutrophils and lymphocytes. Glucose 79 BUN of 12 creatinine 0.69 sodium 142 and potassium 3.7 and these are normal. Chloride was slightly up at 107 which is nonspecific. Calcium 9.0 albumin 4.1 bilirubin 0.6 and alk phos normal now at 114 which is good to see. AST of 26 and ALT of 20 with prior AST of 24 and ALT of 10 so approximately the same in the AST but definitely a little higher on the ALT. Ideal  ALT less than 25. Lipase was normal slightly higher at 38.  She says had a little cold at the time.   labs showed lipase 29 glucose 92 BUN of 9 creatinine 0.68 sodium 139 potassium 3.5 calcium 9.3 bilirubin 0.6 alk phos 124 which is slightly higher than before when it was 122.  Normal is from 44 up to 121.AST 24 and ALT 10. Ideal alt is less than 25.When I see the alkaline phosphatase remains only slightly up such as this, I wonder about your vitamin D level.  Please check with primary provider as that may be a little low and that could potentially be explaining the alkaline phosphatase being slightly up due to increased bone turnover from. Could also be due to slight ursodiol dose correction for weight last time.WBC 4.8, hemoglobin 12.1 platelet count 187 MCV 90 with normal neutrophils 3.4 and lymphocytes 1.0.Per notes last change was ursodiol dose slight adjustment to 250mg three times a day. Given increase alk phos would redo labs again previsit in Dec and see if the labs continue to settle   She is currently ursodiol 750mg a day and imuran 50mg a day and prednisone 1mg TIW.  August 3 MRI shows lower thorax with limited images to the lung bases being unremarkable. Liver with no significant fat or iron deposition.  Mild heterogeneity seen of the liver.  Liver appeared to be mildly small in size. There were no definite morphologic changes of advanced cirrhosis.  There were no suspicious liver lesions. The gallbladder was present and contains some sludge or concentrated bile within it.   No bile duct dilation seen. Spleen was normal at 8.9 cm. No dilation seen in the main pancreatic duct and no suspicious lesions. Adrenal glands normal. Kidneys showed no hydronephrosis or focal renal lesion. There was no bowel obstruction sign seen and incidentally noted gastric diverticulum is seen along the posterior aspect of the gastric fundus measuring 2.2 x 1.9 cm. She has not done an egd and did see Dr Iniguez re this. No recall of ulcer. Mildly prominent lymph nodes were seen around the liver.  They were felt to be nonspecific possibly reactive. Liver vessels were patent.  I do suspect however there was some reflux of contrast material into the left gonadal vein. Small volume fluid was seen in the pelvis which I thought was possibly physiologic which is there with a live possibly this was near the time of your cycle. In summary, mild nonspecific heterogeneous changes seen of the liver with no definite changes of cirrhosis.  No splenomegaly seen and no ascites.  Liver vessels patent.  This is good to see and it confirms that the liver probably does have some scarring changes because of your liver disease history but the good news is that there are no signs of any advanced cirrhosis which was the concern.   labs show lipase normal at 31, glucose normal at 87 BUN of 11 creatinine 0.73 sodium 140 potassium 4.0 calcium 9.3 albumin 4.5 bilirubin 0.3 which is down from 0.5. Good to see the alk phos be almost normal now at 122 from previous 128 and prior 173.  Very happy to see that it is almost normal.  AST 27 and ALT 16 which are doing well.  Ideal ALT less than 25. WBC 4.8 hemoglobin 12.3 platelet count 156 MCV 92 with normal neutrophils and lymphocytes. At our last contact on , it states that you are down to 1 mg a day and so that is good to see that this remains stable.  it is very important to taper these very low doses of steroids slowly as we are doing.  Please do the monitoring labs again as we discussed before the next visit in September.   labs show lipase normal at 18, sugar a little elevated at 114.  Had you been fasting that day?  Please share with local providers. BUN of 7 creatinine 0.71 sodium 139 potassium 3.8 albumin 4.4 bili 0.5 and alk phos almost normal now at 128.  Normal is less than 121.  Previously in March it had been 173 for comparison.  AST 25 and ALT 20 with ideal ALT less than 25. WBC 5 hemoglobin 12.3 plate count 208 MCV 92 and normal neutrophils and lymphocytes.  She is 1mg a day of steroid q other day and on that 2023.  Regarding today's labs, the note from [facility] mentions how you had cut back steroids down to 1 mg a day now. If so, these labs would suggest that you are tolerating this well and we need to continue to watch and slowly taper this so that your body can acclimate to the drop.   2023 wbc 4.4 hg 12.1 platelet 225 and mcv 93 and glu 84 and bun 9 and cr 0.84 and na 141 and k 4.0 and cl 104 and co2 24 and alb 4.2 tb 0.4 and db 0.11 and alk 130 and ast 25 and alt 17. lipase 26.  Plan is to drop to 1mg pred and do the labs in 3 and 6 and 12 weeks.  [facility] ultrasound shows liver coarsened in echotexture and nodular contour suggesting cirrhosis.  No lesions were seen.  Important to do this every 6 months for surveillance. Bile ducts not dilated and common bile duct 1.7 mm. Gallbladder normal.  Skinner sign negative. Pancreas normal were seen. Right kidney 8 point centimeters with no hydronephrosis.  Spleen normal at 9.6 cm. Liver vessels were patent. No ascites seen. In summary they felt she had a cirrhotic liver morphology but no suspicious lesions. Very important to keep your labs as normal as possible for the best chance for this to heal over time. We will plan to redo the scan again in 6 months. Discussed the us with the pt. Explained the issues to her. She does need to look to get the egd done to look for varices.  She did not have covid 19 around the time of the u,s.  Right now meds radha 300mg tid, aza 50mg po qd, 1mg po qd.  Vit d3 and 2000 iu po qd calcium with vit d 3 x week.  3-9-2023 wbc 3.6 hg 12.9 and plat 224 and mcv 91 and neutrophils 2.3 and lymphs 0.9.  glu 91 and bun 9 and cr 0.78 an na 141 and k 3.7 and cl 105 and co2 26 and alb 4.3 and tb 0.4 and alk 173 and  ast 28 and alt 20.  Prior alk 163 so that went up some. Lpase 22 and inr 1.09.  She is not planning another.  2022 ultrasound shows liver echogenicity appears increased and with no focal lesions. Common bile duct was normal at 2 mm. Gallbladder appeared normal. No significant ascites was seen. Right kidney was 9.2 cm and no hydronephrosis was seen. Liver vessels were patent. Spleen was normal in size. Overall the liver was echogenic in keeping with possible chronic liver disease but that also could be related to fatty liver. Need to look at your lab patterns. Patent vessels were seen. Long-term we can talk about trying to get an MRI at some point electively to look deeper at this liver issue and sort out fat versus fibrosis better.  She stopped breast feeding in 2023 and went for 11 m.  2022 : lipase 22 and normal. Glu 94 and bun 8 and cr 0.60 and na 139 and k 3.9 and cl 103 and co2 23 and ca 9.3 and alb 4.3 and tb 0.3 and alk 147 and down from 190 on sept 15. Ast 26 and alt 19. Prior ast 35 and alt 27. Wbc 5.5 and hg 11.7 and hct 35.8 and plat 206. Mcv 88 and neutrophils 4.2 and lymphs 0.8.    labs showed liver fraction 74%, bone fraction 23% and intestinal fraction 3% so this confirms that the alkaline phosphatase is mainly coming from liver which is the normal pattern. Albumin 4.4 bilirubin 0.4, direct bilirubin 0.6 alkaline phosphatase 165 up from 163 back in  for comparison.  AST 29 ALT 20 and previous AST 23 and 16 back on .  Sept 15: glu 73 and bun 11 and cr 0.76 and na 142 and k 4.4 and cl 103 and co2 22 and ca 9.6 and total protein 8.0 and alb 4.4 and tb 0.4 and alk 190 (up from 174 and prior 142.) Ast 35 and alt 27. So these are higher.  July limited hepatitis alk 163 and ast 23 and alt 16 they were lower.  Wbc 4.1 and hg 12.5 and hct 40.0 and mcv 88 and platelets 225.  MCHC or mean corpuscular hemoglobin concentration 31.3 little low but up from 29.8 prior. Please share with local provider. Neutrophils 2.8 and lymphs 0.9.   Per notes, on aza 50mg po qd and prednisone 2mg po qd and ursodiol 300mg po tid (increased in July).  She stopped the fenugreek like medicine (crackers took) a few weeks ago for 2 weeks month ago. [employer] Lactation Cheddar crisps: have turmeric in them.  Prior she had started iron pills and taking them less so.          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   labs show alb 4.1 and tb 0.3 and direct bilirubin less than 0.1 and elevated alk phos of 163 and ast 23 and alt 16.  [facility] labs alk 174 and ast 34 and alt 31.   ultrasound shows partially visualized pancreas unremarkable. The liver parenchyma however it did appear to be coarsened but with no mass. Gallbladder thin-walled and no stones. Common bile duct normal at 2 mm.  Right kidney 8.9 cm with no hydronephrosis.  Spleen normal at 9.6 cm. Liver vessels patent. They mention that the coarsening could represent early chronic liver disease.  On your last year ultrasound the liver had some coarsening and even mild surface nodularity seen, so even though it does not appear at first glance to be better, the description that they used is milder now. 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   labs show white blood cell count 4.1 hemoglobin 12.1 platelet count 219 MCV 89 neutrophils 1.6 and lymphocytes 1.9.  These are all in normal range. Glucose 166 still up and down from 361 last time.  BUN of 10 creatinine 0.73 which is now much better than previous 1.45 cr of April.  Please share with primary provider. Sodium 142 potassium 4.0 albumin 4.2.  Total bilirubin 0.4 alkaline phosphatase 68 and AST normal at 33 down from 55.  ALT 28 normal down from 49.  Ideal ALT is less than 25. Look forward to seeing you soon.  May 2022 Did TH with pt. due to itching will reduce dose of radha to bid dosing. max dose around 900mg. continue pred 2mg for now has been on this since 2022.  pt had baby girl on 22,   Dr. Wakefield worked with her in prior pregnancy.  She stayed on imuran during pregnancy. At start prednisone 10mg 8/10/21 and radha bid and weaned as per ob.     labs show normal lipase of 28 previously 31.  Glucose still slightly up at 106 previously 109 BUN of 11 creatinine 0.68 sodium 136 potassium 4.4 calcium 9.5 albumin 4.6 bilirubin 0.4 alkaline phosphatase 90 AST 35 ALT 25.  Prior AST 28 and ALT 17 so slightly higher.  Prior alkaline phosphatase 88.   White blood cell count 8.4 hemoglobin trending better at 10.9 but still low but better than prior10.8 and prior 10.3.  MCV normal 86 platelet count 302 up from 233.  Neutrophils normal at 7 lymphocytes 0.8.   2021 u/s: ultrasound shows the partially visualized pancreas to be unremarkable.  Liver has a coarsened echotexture to its parenchyma and with mild surface nodularity.  No focal mass seen. Gallbladder was thin-walled and the common bile duct was normal at 2 mm.  Right kidney measured 8.5 cm with no hydronephrosis.  Spleen normal 10 cm.  No ascites seen.  Liver vessels were patent. They overall felt that your liver parenchyma appeared to have cirrhotic features and with no focal mass.  Plan: 1.  Pt due for the u.s in may 2025 [facility]. 2.  Pt will do gas x pre the scan for a few days. 3.  Pt currently radha 750mg aza 50mg and 2mg prednisone once a week.  4.  She will go to 1 mg a week of the steroid.  5.  See us in May. 6.  Staying on other meds.   Duration of the visit was 47 min with 15  min of chart prep reviewing multiple labs and contacts since last visit and load data and information that was available for the visit and setting up in ecw and then an additional 32 min for the [facility] telemed visit with tie spent  reviewing labs and tests and cinical correlates and then with this information being used to formulate a treatment plan.

## 2025-02-06 NOTE — HPI-TODAY'S VISIT:
Dear Paty Combs, February 5 labs show WBC a little low at 3.1 hemoglobin normal at 12.3 hematocrit 37.2 platelet count 215 MCV 94 with neutrophils 2.0 and lymphocytes 0.8. Glucose 89 BUN of 10 creatinine 0.63 sodium 140 potassium 4.5 calcium 9.3 albumin 4.3 bilirubin 0.6 alk phos 112 AST 25 ALT of 17 with ideal ALT less than 25 so very pleased to see labs are stable.  Lipase was normal at 27.   Per our December 18 last contact we were concerned that your labs have been running higher because you were taking that green tea containing product and its effect appears to have likely completely worn off now.  Those labs back on December 6 and showed an AST of 32 and ALT of 20 and as you can see these labs are actually lower now as well. Many people do not realize that these immune stimulatory teas can cause this sort of issue particularly in people like yourself that have  a pre-existing immune issue. Dr. Isbell

## 2025-03-03 ENCOUNTER — LAB OUTSIDE AN ENCOUNTER (OUTPATIENT)
Dept: URBAN - METROPOLITAN AREA TELEHEALTH 2 | Facility: TELEHEALTH | Age: 35
End: 2025-03-03

## 2025-03-12 ENCOUNTER — TELEPHONE ENCOUNTER (OUTPATIENT)
Dept: URBAN - METROPOLITAN AREA CLINIC 86 | Facility: CLINIC | Age: 35
End: 2025-03-12

## 2025-03-12 LAB
ALBUMIN: 3.9
ALKALINE PHOSPHATASE: 90
ALT (SGPT): 12
AST (SGOT): 24
BILIRUBIN, DIRECT: 0.14
BILIRUBIN, TOTAL: 0.4
PROTEIN, TOTAL: 7

## 2025-03-12 NOTE — HPI-TODAY'S VISIT:
Dear Paty Combs, March 11 labs total protein 7.0 and alb 3.9 and tb 0.4 and db 0.14 and alk 90 and ast 24 and alt 12 and prior Dec 2024 labs ast 32 and alt 20. So curious to see how long that green tea drink removal that led to flare took to wear off this. Please redo labs pre May visit as all was stable on these labs. Dr Isbell

## 2025-05-01 ENCOUNTER — LAB OUTSIDE AN ENCOUNTER (OUTPATIENT)
Dept: URBAN - METROPOLITAN AREA TELEHEALTH 2 | Facility: TELEHEALTH | Age: 35
End: 2025-05-01

## 2025-05-07 ENCOUNTER — LAB OUTSIDE AN ENCOUNTER (OUTPATIENT)
Dept: URBAN - METROPOLITAN AREA CLINIC 86 | Facility: CLINIC | Age: 35
End: 2025-05-07

## 2025-05-08 ENCOUNTER — OFFICE VISIT (OUTPATIENT)
Dept: URBAN - METROPOLITAN AREA TELEHEALTH 2 | Facility: TELEHEALTH | Age: 35
End: 2025-05-08
Payer: COMMERCIAL

## 2025-05-08 ENCOUNTER — LAB OUTSIDE AN ENCOUNTER (OUTPATIENT)
Dept: URBAN - METROPOLITAN AREA TELEHEALTH 2 | Facility: TELEHEALTH | Age: 35
End: 2025-05-08

## 2025-05-08 ENCOUNTER — TELEPHONE ENCOUNTER (OUTPATIENT)
Dept: URBAN - METROPOLITAN AREA CLINIC 86 | Facility: CLINIC | Age: 35
End: 2025-05-08

## 2025-05-08 DIAGNOSIS — Z98.890 HISTORY OF LIVER BIOPSY: ICD-10-CM

## 2025-05-08 DIAGNOSIS — Z71.89 VACCINE COUNSELING: ICD-10-CM

## 2025-05-08 DIAGNOSIS — Z79.899 HIGH RISK MEDICATION USE: ICD-10-CM

## 2025-05-08 DIAGNOSIS — K74.02 HEPATIC FIBROSIS, ADVANCED FIBROSIS: ICD-10-CM

## 2025-05-08 DIAGNOSIS — R76.9 ABNORMAL IMMUNOLOGICAL FINDING IN SERUM: ICD-10-CM

## 2025-05-08 DIAGNOSIS — K75.4 AUTOIMMUNE HEPATITIS: ICD-10-CM

## 2025-05-08 DIAGNOSIS — R74.8 ABNORMAL LIVER ENZYMES: ICD-10-CM

## 2025-05-08 LAB
ALBUMIN: 4.1
ALKALINE PHOSPHATASE: 118
ALT (SGPT): 14
AST (SGOT): 29
BILIRUBIN, DIRECT: 0.19
BILIRUBIN, TOTAL: 0.5
PROTEIN, TOTAL: 7.6

## 2025-05-08 PROCEDURE — 99214 OFFICE O/P EST MOD 30 MIN: CPT

## 2025-05-08 RX ORDER — AZATHIOPRINE 50 MG/1
TAKE 1 TABLET BY MOUTH DAILY TABLET ORAL
Qty: 90 TABLET | Refills: 1 | Status: ACTIVE | COMMUNITY

## 2025-05-08 RX ORDER — PREDNISONE 1 MG/1
1 TABLET WITH FOOD ONCE A WEEK (NEW DOSE) TABLET ORAL
Qty: 12 | Refills: 1 | Status: ACTIVE | COMMUNITY

## 2025-05-08 RX ORDER — CHOLECALCIFEROL (VITAMIN D3) 50 MCG
1 TABLET CAPSULE ORAL ONCE A DAY
Status: ACTIVE | COMMUNITY

## 2025-05-08 RX ORDER — CALCIUM CARBONATE/VITAMIN D3 500MG-5MCG
1 TABLET WITH MEALS TABLET ORAL
Status: ACTIVE | COMMUNITY

## 2025-05-08 RX ORDER — VALACYCLOVIR 500 MG/1
1 TABLET TABLET, FILM COATED ORAL BID PRN
Status: ACTIVE | COMMUNITY

## 2025-05-08 RX ORDER — URSODIOL 250 MG/1
1 TABLET WITH FOOD TABLET ORAL
Qty: 270 | Refills: 1 | OUTPATIENT
Start: 2025-05-04

## 2025-05-08 RX ORDER — PREDNISONE 1 MG/1
1 TABLET WITH FOOD ONCE A WEEK (NEW DOSE) TABLET ORAL
Qty: 12 | Refills: 1
Start: 2025-05-04

## 2025-05-08 RX ORDER — URSODIOL 250 MG/1
1 TABLET WITH FOOD TABLET ORAL
Qty: 270 | Refills: 1 | Status: ACTIVE | COMMUNITY

## 2025-05-08 RX ORDER — FERROUS SULFATE 325(65) MG
1 TABLET TABLET ORAL TIW
Status: ACTIVE | COMMUNITY

## 2025-05-08 RX ORDER — AZATHIOPRINE 50 MG/1
TAKE 1 TABLET BY MOUTH DAILY TABLET ORAL
Qty: 90 TABLET | Refills: 1
Start: 2025-05-04 | End: 2025-10-31

## 2025-05-08 NOTE — HPI-TODAY'S VISIT:
Pt is a 35 year old female who presents post 2025 visit for follow up of AIH post pregnancy and she delivered 2022.  A copy of the note will be sent to referring providers.  Did labs and pending.   labs total protein 7.0 and alb 3.9 and tb 0.4 and db 0.14 and alk 90 and ast 24 and alt 12 and prior Dec 2024 labs ast 32 and alt 20.  So curious to see how long that green tea drink removal that led to flare took to wear off this.  Asked re u.s and trying to get this,  Dec 6 labs show all of your liver labs are normal with a total protein 7.5 albumin 4.2 bilirubin 0.5 direct 0.2 to alk phos 115 AST 32 ALT of 20. Back in August your alk phos was 128 which was elevated AST 23 and ALT 14.  As you recall your  labs also likewise had shown an alk phos normal at 110 AST of 26 and ALT of 16 so these labs are very close to those.  Stopped the green tea which is what we suspected had caused flare. No matcha either. Not doing energy drinks.  Need to check the labs to be sure. She did labs yesterday at [facility] and bryce is calling and did get.   wbc 3.1 little low and oct 4.3. hg 12.3 and hct 37.2 and mcv 94 and platelets 215 and neutrophils 2.0 and lymphs 0.8 and glu 89 and bun 10 and cr 0.63 and na 140 and k 4.5 and cl 106 and  co2 24 and ca 9.3 and alb 4.3 and tb 0.6 and alk 112 and ast 25 and alt 17. Lipase 27.  So these labs are closer top baseline.   Dec 6 alk 115 and ast 32 and alt 20.   2024 ultrasound shows pancreas not well-seen due to gas. Abdominal aorta and inferior vena cava were normal in caliber were seen. Liver was 10.9 cm with some large portions of the liver not seen due to overlying gas. Within these confines, the liver was normal in heterogeneity and homogeneous/even in echotexture. Main portal vein was patent with appropriate directional flow. Right kidney 6.7 cm in length. No hydronephrosis seen. Common bile duct was 4 mm. Gallbladder not distended and with no thickening.  Overall no sonographic abnormality seen but the exam was somewhat limited as mentioned.   July 3 was finally sent in. The ultrasound showed the liver, spleen, visualized pancreas, gallbladder appeared unremarkable with the common bile duct. Normal at 3.9 mm. Right kidney was slightly small at 8.8 cm and left kidney was normal size at 7.3 cm. No renal stones were seen no hydronephrosis to kidneys was seen. Visualized abdominal aorta and IVC was unremarkable. Overall they felt you had an unremarkable exam except for a slightly small right kidney.    Your comparison that we have is unfortunately from an older MRI from August of last year that showed at that timeframe that the liver had no significant fat or iron but was mildly heterogeneous and they obviously are not seen that on their ultrasound. The spleen was normal back then and they mentioned that the kidneys had no hydronephrosis or stones but they did not mention that the size of the right kidney appeared to be somewhat smaller in size vs the other so that makes me wonder how well was the kidney seen on their u.s.   We need to try to see if we can get back to doing ultrasound either through [facility] to have that continuity of care again or establish with another provider like [facility] like we talked about as the other place did not have any comparisons and the reading like some of the details that were are accustomed to see in the descriptors.   labs showed WBC 4.3 hemoglobin 12.4 MCV 95 and platelet count 185 and neutrophils and lymphocytes normal which is good to see. Glucose was 88 BUN of 13 creatinine 0.61 sodium 138 potassium 3.8 calcium 9.0 bilirubin 0.5 and alk phos normal at 110 and AST 26 and ALT is 16.  Prior September 3 labs showed alk phos 116 AST 23 and ALT 14.  As you recall, earlier in the late summer we suspected that the labs had bumped up because of some green tea exposure and it took a little bit longer than expected but finally settled down.  9/3/24 labs were sent to us. The bilirubin 0.3, alkaline phosphatase 116, AST 23 and ALT 14. Goal for the AST and ALT is less than 25 and happy to see that you are there.   labs showed lipase stable for you at 28 from previous 24 in May. Sugar was a little up at 101 and unclear if you were fasting?  BUN of 7 creatinine 0.63 sodium 139 potassium 4.2 chloride normal at this time at 103 and CO2 of 22. WBC 3.8 hemoglobin 12.5 platelet count 211 MCV 94 with normal neutrophils and lymphocytes. Total protein 7.3 albumin 4.1 bilirubin 0.4 and direct bili 0.13 and these are normal.  Alk phos continues to drop now down to 128 from 132 and prior 149.  AST down to 23 and ALT down to 14 and this had come down from previous AST 27 and ALT 14 on prior AST 28 and ALT 19.  Right now she is on ursodio 750mg a day and aza 50mg po qd.  August 15 labs showed total protein 7.3 albumin 4 point bilirubin 0.5 and direct bili 0.14. Alk phos still elevated but down to 132 from 148 before.  AST was 20 and ALT of 14 and these are little bit lower.   labs show albumin normal at 4.5, bilirubin normal at 0.4 and direct bilirubin 0.11 stable. Your alk phos however was up at 149 and had been normal at 111 back in . AST was 28 and ALT of 19 and previously AST 21 and ALT 10. Per notes, you have been on prednisone 1 mg twice a week ursodiol 750 mg a day aza 50 mg a day and not changed as of last visit. No injury of this. Peach tea may have green tea.    labs show total protein 7.2 and alb 4.1 and tb 0.3 and direct bili 0.11 and alk 118 and ast 21 and alt 10.  May 10 alk 95 and ast 20 and alt 9.  May 24 labs show glucose 94, BUN of 11 creatinine 0.7 sodium 140 potassium 4.2 chloride 104 CO2 of 24 albumin 4.1 bilirubin 0.4 and alkaline phosphatase slightly higher at 126 just above the normal range. AST was 34 and ALT of 17 with ideal alt less than 25. WBC 5.4 normal hemoglobin 12.6 platelet count 184 MCV 94 with normal neutrophils and lymphocytes.  Per notes you are on the prednisone 1 mg twice a week and the ursodiol 750 mg total a day and the Imuran 50 mg a day.  The alkaline phosphatase could go up slightly due to potential bone injury source such as a recent trauma or could be nonspecific lab variation.  May 10 labs show wbc little low 3.2 and normal prior. Hg 11.9 and hct 36.1. Platelet count 207. MCV 95. Neutrophils were normal at 1.9 and lymphocytes 1.0 so that was good to see. Per last notes from  Lipase was 24 normal, glucose of 80 BUN of 12 creatinine 0.67 sodium 143 potassium 4.1 chloride a little up at 109 CO2 of 23 normal. Albumin 3.9, bilirubin 0.3, direct bili 0.12 alk phos 95 AST 20 ALT of 9 and previously AST 22 and ALT of 12.  Per  note, it mentions that you are on the prednisone 1 mg twice a week and we were following the labs on that lower dose.  She has been on that dose now for 6 weeks.   labs show albumin 4.5, bilirubin 0.3, direct bilirubin 0.1 alk phos 108 AST 22 and ALT 12 which are all in the normal range with ideal ALT less than 25.   total protein 7.3 and albumin 4.4 and total bili 0.4 and direct bili 0.11 and alk 119 and ast 20 and alt 9. Prior alk 101 and ast 22 and alt 12.   labs showed lipase normal at 16 and down from 23 back on . Total protein 7.0 albumin 4.1 bilirubin 0.7 and direct bilirubin 0.18. These are all normal range. Your alk phos was 101 slightly up from 95 but still normal. AST down to 22 and ALT of 12 and previously AST 25 and ALT 13. Per the notes from  we had mentioned that if your labs continue to settle out and we would consider making some changes. At this point we have you listed as being on ursodiol 750 mg total a day, the Imuran 50 mg total a day and the prednisone at only 1 mg 3 times a week. At this point if you are feeling comfortable we can proceed to drop that to 1 mg of the prednisone twice a week such as Monday and Thursday. If so then I would recommend you repeat your labs then in 2 weeks and then again in 6 weeks and then again before your visit in May.   labs show lipase 23 and normal.Total protein 7.0 albumin 4.2 total bilirubin 0.3 direct bilirubin 0.1 alk phos 95 AST 25 ALT 13 and these are all back to normal range. As you recall your  labs showed an alk phos of 118 AST of 41 and ALT of 20 I believe you told me you had been ill recently at the time. Good to see that this is now stabilized. I was looking at your meds and you are listed as being on a total of 750 mg of ursodiol a day, Imuran 50 mg a day and the prednisone only at 1 mg 3 times a week and I would go slow at trying to drop this any further. If you redo your labs again in another month and they remain normal then I will be more willing to consider the drop then at that point.   : lipase 21 and down from 38 in  and prior dec lipase 84. Glucose 93 and bun 13 and cr 0.7 and na 136 and k 3.6 and cl 101 and co2 21 and ca 9.0 and alb 4.4 and tb 0.3 and alk 118 and ast 41 elevated and prior 27 and alt 20 up from 16 so likely from the URI that you have. Wbc 4.2 and hg 11.9 ad hct 35.5 and platelets 206 and mcv 91. Neutrophils 3.3 normal but lymphs low at 0.3 and could be due to viral issues. Normal prior. I would do as we planned and let labs settle down and redo them in a month and see where you are tehn and follow course.   2024 lipase 38 and down from 84 spike in dec and close to prior 29. Glucose 108 elevated and bun 9 and cr 0.63 and na 141 and k 3.9 and cl 105 and co2 23 and ca 9.0 and alb 4.0 and tb 0.4 and alk 115 and ast 27 and alt 16. Prior alk 105 and ast 21 and alt 14. Ideal alt less than 25. Wbc 3.7 and hg 11.9 and hct 34.9 and platelets 198. mcv 91.Neutrophils 2.2 and lymphs 1.1.   2023 labs show an isolated lipase elevation of 84 which is above the normal range of 14-72 and you previously had been 29 back in October and 38 in November. Have you been ill recently as that can sometimes cause this particular if he had a gastrointestinal process? Sugar was 85 BUN is 7 creatinine 0.71 sodium 139 potassium 4.1 albumin 4.3 bilirubin 0.4 alk phos 105 AST 21 ALT 14 send no liver labs really to suggest any issues going on. WBC 4.2 hemoglobin 12.1 platelet count 201 MCV 92 with neutrophils normal at 2.8 lymphocytes 0.9. Called pt to see what may have happened of late. She says 2 -3 weeks ago had upper respiratory issues and did not see anyone and resolved. Will plan to redo the labs in 2 weeks and see how doing.    labs showed WBC 4.6 hemoglobin 11.4 platelet count 198 MCV 93 with normal neutrophils and lymphocytes. Glucose 79 BUN of 12 creatinine 0.69 sodium 142 and potassium 3.7 and these are normal. Chloride was slightly up at 107 which is nonspecific. Calcium 9.0 albumin 4.1 bilirubin 0.6 and alk phos normal now at 114 which is good to see. AST of 26 and ALT of 20 with prior AST of 24 and ALT of 10 so approximately the same in the AST but definitely a little higher on the ALT. Ideal  ALT less than 25. Lipase was normal slightly higher at 38.  She says had a little cold at the time.   labs showed lipase 29 glucose 92 BUN of 9 creatinine 0.68 sodium 139 potassium 3.5 calcium 9.3 bilirubin 0.6 alk phos 124 which is slightly higher than before when it was 122.  Normal is from 44 up to 121.AST 24 and ALT 10. Ideal alt is less than 25.When I see the alkaline phosphatase remains only slightly up such as this, I wonder about your vitamin D level.  Please check with primary provider as that may be a little low and that could potentially be explaining the alkaline phosphatase being slightly up due to increased bone turnover from. Could also be due to slight ursodiol dose correction for weight last time.WBC 4.8, hemoglobin 12.1 platelet count 187 MCV 90 with normal neutrophils 3.4 and lymphocytes 1.0.Per notes last change was ursodiol dose slight adjustment to 250mg three times a day. Given increase alk phos would redo labs again previsit in Dec and see if the labs continue to settle   She is currently ursodiol 750mg a day and imuran 50mg a day and prednisone 1mg TIW.  August 3 MRI shows lower thorax with limited images to the lung bases being unremarkable. Liver with no significant fat or iron deposition.  Mild heterogeneity seen of the liver.  Liver appeared to be mildly small in size. There were no definite morphologic changes of advanced cirrhosis.  There were no suspicious liver lesions. The gallbladder was present and contains some sludge or concentrated bile within it.   No bile duct dilation seen. Spleen was normal at 8.9 cm. No dilation seen in the main pancreatic duct and no suspicious lesions. Adrenal glands normal. Kidneys showed no hydronephrosis or focal renal lesion. There was no bowel obstruction sign seen and incidentally noted gastric diverticulum is seen along the posterior aspect of the gastric fundus measuring 2.2 x 1.9 cm. She has not done an egd and did see Dr Iniguez re this. No recall of ulcer. Mildly prominent lymph nodes were seen around the liver.  They were felt to be nonspecific possibly reactive. Liver vessels were patent.  I do suspect however there was some reflux of contrast material into the left gonadal vein. Small volume fluid was seen in the pelvis which I thought was possibly physiologic which is there with a live possibly this was near the time of your cycle. In summary, mild nonspecific heterogeneous changes seen of the liver with no definite changes of cirrhosis.  No splenomegaly seen and no ascites.  Liver vessels patent.  This is good to see and it confirms that the liver probably does have some scarring changes because of your liver disease history but the good news is that there are no signs of any advanced cirrhosis which was the concern.   labs show lipase normal at 31, glucose normal at 87 BUN of 11 creatinine 0.73 sodium 140 potassium 4.0 calcium 9.3 albumin 4.5 bilirubin 0.3 which is down from 0.5. Good to see the alk phos be almost normal now at 122 from previous 128 and prior 173.  Very happy to see that it is almost normal.  AST 27 and ALT 16 which are doing well.  Ideal ALT less than 25. WBC 4.8 hemoglobin 12.3 platelet count 156 MCV 92 with normal neutrophils and lymphocytes. At our last contact on , it states that you are down to 1 mg a day and so that is good to see that this remains stable.  it is very important to taper these very low doses of steroids slowly as we are doing.  Please do the monitoring labs again as we discussed before the next visit in September.   labs show lipase normal at 18, sugar a little elevated at 114.  Had you been fasting that day?  Please share with local providers. BUN of 7 creatinine 0.71 sodium 139 potassium 3.8 albumin 4.4 bili 0.5 and alk phos almost normal now at 128.  Normal is less than 121.  Previously in March it had been 173 for comparison.  AST 25 and ALT 20 with ideal ALT less than 25. WBC 5 hemoglobin 12.3 plate count 208 MCV 92 and normal neutrophils and lymphocytes.  She is 1mg a day of steroid q other day and on that 2023.  Regarding today's labs, the note from [facility] mentions how you had cut back steroids down to 1 mg a day now. If so, these labs would suggest that you are tolerating this well and we need to continue to watch and slowly taper this so that your body can acclimate to the drop.   2023 wbc 4.4 hg 12.1 platelet 225 and mcv 93 and glu 84 and bun 9 and cr 0.84 and na 141 and k 4.0 and cl 104 and co2 24 and alb 4.2 tb 0.4 and db 0.11 and alk 130 and ast 25 and alt 17. lipase 26.  Plan is to drop to 1mg pred and do the labs in 3 and 6 and 12 weeks.  [facility] ultrasound shows liver coarsened in echotexture and nodular contour suggesting cirrhosis.  No lesions were seen.  Important to do this every 6 months for surveillance. Bile ducts not dilated and common bile duct 1.7 mm. Gallbladder normal.  Skinner sign negative. Pancreas normal were seen. Right kidney 8 point centimeters with no hydronephrosis.  Spleen normal at 9.6 cm. Liver vessels were patent. No ascites seen. In summary they felt she had a cirrhotic liver morphology but no suspicious lesions. Very important to keep your labs as normal as possible for the best chance for this to heal over time. We will plan to redo the scan again in 6 months. Discussed the us with the pt. Explained the issues to her. She does need to look to get the egd done to look for varices.  She did not have covid 19 around the time of the u,s.  Right now meds radha 300mg tid, aza 50mg po qd, 1mg po qd.  Vit d3 and 2000 iu po qd calcium with vit d 3 x week.  3-9-2023 wbc 3.6 hg 12.9 and plat 224 and mcv 91 and neutrophils 2.3 and lymphs 0.9.  glu 91 and bun 9 and cr 0.78 an na 141 and k 3.7 and cl 105 and co2 26 and alb 4.3 and tb 0.4 and alk 173 and  ast 28 and alt 20.  Prior alk 163 so that went up some. Lpase 22 and inr 1.09.  She is not planning another.  2022 ultrasound shows liver echogenicity appears increased and with no focal lesions. Common bile duct was normal at 2 mm. Gallbladder appeared normal. No significant ascites was seen. Right kidney was 9.2 cm and no hydronephrosis was seen. Liver vessels were patent. Spleen was normal in size. Overall the liver was echogenic in keeping with possible chronic liver disease but that also could be related to fatty liver. Need to look at your lab patterns. Patent vessels were seen. Long-term we can talk about trying to get an MRI at some point electively to look deeper at this liver issue and sort out fat versus fibrosis better.  She stopped breast feeding in 2023 and went for 11 m.  2022 : lipase 22 and normal. Glu 94 and bun 8 and cr 0.60 and na 139 and k 3.9 and cl 103 and co2 23 and ca 9.3 and alb 4.3 and tb 0.3 and alk 147 and down from 190 on sept 15. Ast 26 and alt 19. Prior ast 35 and alt 27. Wbc 5.5 and hg 11.7 and hct 35.8 and plat 206. Mcv 88 and neutrophils 4.2 and lymphs 0.8.    labs showed liver fraction 74%, bone fraction 23% and intestinal fraction 3% so this confirms that the alkaline phosphatase is mainly coming from liver which is the normal pattern. Albumin 4.4 bilirubin 0.4, direct bilirubin 0.6 alkaline phosphatase 165 up from 163 back in  for comparison.  AST 29 ALT 20 and previous AST 23 and 16 back on .  Sept 15: glu 73 and bun 11 and cr 0.76 and na 142 and k 4.4 and cl 103 and co2 22 and ca 9.6 and total protein 8.0 and alb 4.4 and tb 0.4 and alk 190 (up from 174 and prior 142.) Ast 35 and alt 27. So these are higher.  July limited hepatitis alk 163 and ast 23 and alt 16 they were lower.  Wbc 4.1 and hg 12.5 and hct 40.0 and mcv 88 and platelets 225.  MCHC or mean corpuscular hemoglobin concentration 31.3 little low but up from 29.8 prior. Please share with local provider. Neutrophils 2.8 and lymphs 0.9.   Per notes, on aza 50mg po qd and prednisone 2mg po qd and ursodiol 300mg po tid (increased in July).  She stopped the fenugreek like medicine (crackers took) a few weeks ago for 2 weeks month ago. [employer] Lactation Cheddar crisps: have turmeric in them.  Prior she had started iron pills and taking them less so.          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   labs show alb 4.1 and tb 0.3 and direct bilirubin less than 0.1 and elevated alk phos of 163 and ast 23 and alt 16.  [facility] labs alk 174 and ast 34 and alt 31.   ultrasound shows partially visualized pancreas unremarkable. The liver parenchyma however it did appear to be coarsened but with no mass. Gallbladder thin-walled and no stones. Common bile duct normal at 2 mm.  Right kidney 8.9 cm with no hydronephrosis.  Spleen normal at 9.6 cm. Liver vessels patent. They mention that the coarsening could represent early chronic liver disease.  On your last year ultrasound the liver had some coarsening and even mild surface nodularity seen, so even though it does not appear at first glance to be better, the description that they used is milder now. 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   labs show white blood cell count 4.1 hemoglobin 12.1 platelet count 219 MCV 89 neutrophils 1.6 and lymphocytes 1.9.  These are all in normal range. Glucose 166 still up and down from 361 last time.  BUN of 10 creatinine 0.73 which is now much better than previous 1.45 cr of April.  Please share with primary provider. Sodium 142 potassium 4.0 albumin 4.2.  Total bilirubin 0.4 alkaline phosphatase 68 and AST normal at 33 down from 55.  ALT 28 normal down from 49.  Ideal ALT is less than 25. Look forward to seeing you soon.  May 2022 Did  with pt. due to itching will reduce dose of radha to bid dosing. max dose around 900mg. continue pred 2mg for now has been on this since 2022.  pt had baby girl on 22,   Dr. Wakefield worked with her in prior pregnancy.  She stayed on imuran during pregnancy. At start prednisone 10mg 8/10/21 and radha bid and weaned as per ob.     labs show normal lipase of 28 previously 31.  Glucose still slightly up at 106 previously 109 BUN of 11 creatinine 0.68 sodium 136 potassium 4.4 calcium 9.5 albumin 4.6 bilirubin 0.4 alkaline phosphatase 90 AST 35 ALT 25.  Prior AST 28 and ALT 17 so slightly higher.  Prior alkaline phosphatase 88.   White blood cell count 8.4 hemoglobin trending better at 10.9 but still low but better than prior10.8 and prior 10.3.  MCV normal 86 platelet count 302 up from 233.  Neutrophils normal at 7 lymphocytes 0.8.   2021 u/s: ultrasound shows the partially visualized pancreas to be unremarkable.  Liver has a coarsened echotexture to its parenchyma and with mild surface nodularity.  No focal mass seen. Gallbladder was thin-walled and the common bile duct was normal at 2 mm.  Right kidney measured 8.5 cm with no hydronephrosis.  Spleen normal 10 cm.  No ascites seen.  Liver vessels were patent. They overall felt that your liver parenchyma appeared to have cirrhotic features and with no focal mass.  Plan: 1.  Pt due for the u.s done at [facility]. 2.  Pt will do gas x pre the scan for a few days. 3.  Pt currently radha 750mg aza 50mg and 1 mg prednisone once a week.  4.  See us in 3m.   Duration of the visit was  31 min with 15  min of chart prep reviewing multiple labs and contacts since last visit and load data and information that was available for the visit and setting up in ecw and then an additional 16 min for the [facility] telemed visit with tie spent  reviewing labs and tests and cinical correlates and then with this information being used to formulate a treatment plan.

## 2025-05-08 NOTE — HPI-TODAY'S VISIT:
Dear Paty Combs, May 7 labs show total protein 7.6, albumin 4.1, bilirubin 0.5 direct bilirubin 0.19 alk phos 118 which is a little bit higher than in March and it was 90.  AST was 29 which was a little higher and ALT was 14 which is little higher. Would not make any changes right now and would review the labs in 6 weeks and if they stabilize out then we can talk about making an further drop. Need to be careful with these really small doses as believe it or not they can potentially still cause issues when adjusted. Dr. Isbell

## 2025-05-29 ENCOUNTER — OFFICE VISIT (OUTPATIENT)
Dept: URBAN - METROPOLITAN AREA CLINIC 91 | Facility: CLINIC | Age: 35
End: 2025-05-29
Payer: COMMERCIAL

## 2025-05-29 ENCOUNTER — OFFICE VISIT (OUTPATIENT)
Dept: URBAN - METROPOLITAN AREA CLINIC 91 | Facility: CLINIC | Age: 35
End: 2025-05-29

## 2025-05-29 DIAGNOSIS — K75.4 AUTOIMMUNE HEPATITIS: ICD-10-CM

## 2025-05-29 DIAGNOSIS — K74.02 HEPATIC FIBROSIS, ADVANCED FIBROSIS: ICD-10-CM

## 2025-05-29 PROCEDURE — 93975 VASCULAR STUDY: CPT

## 2025-05-29 PROCEDURE — 76705 ECHO EXAM OF ABDOMEN: CPT

## 2025-05-29 RX ORDER — VALACYCLOVIR 500 MG/1
1 TABLET TABLET, FILM COATED ORAL BID PRN
Status: ACTIVE | COMMUNITY

## 2025-05-29 RX ORDER — PREDNISONE 1 MG/1
1 TABLET WITH FOOD ONCE A WEEK (NEW DOSE) TABLET ORAL
Qty: 12 | Refills: 1 | Status: ACTIVE | COMMUNITY
Start: 2025-05-04

## 2025-05-29 RX ORDER — CALCIUM CARBONATE/VITAMIN D3 500MG-5MCG
1 TABLET WITH MEALS TABLET ORAL
Status: ACTIVE | COMMUNITY

## 2025-05-29 RX ORDER — URSODIOL 250 MG/1
1 TABLET WITH FOOD TABLET ORAL
Qty: 270 | Refills: 1 | Status: ACTIVE | COMMUNITY
Start: 2025-05-04

## 2025-05-29 RX ORDER — AZATHIOPRINE 50 MG/1
TAKE 1 TABLET BY MOUTH DAILY TABLET ORAL
Qty: 90 TABLET | Refills: 1 | Status: ACTIVE | COMMUNITY
Start: 2025-05-04 | End: 2025-10-31

## 2025-05-29 RX ORDER — CHOLECALCIFEROL (VITAMIN D3) 50 MCG
1 TABLET CAPSULE ORAL ONCE A DAY
Status: ACTIVE | COMMUNITY

## 2025-05-29 RX ORDER — FERROUS SULFATE 325(65) MG
1 TABLET TABLET ORAL TIW
Status: ACTIVE | COMMUNITY

## 2025-05-30 ENCOUNTER — TELEPHONE ENCOUNTER (OUTPATIENT)
Dept: URBAN - METROPOLITAN AREA CLINIC 86 | Facility: CLINIC | Age: 35
End: 2025-05-30

## 2025-05-30 ENCOUNTER — LAB OUTSIDE AN ENCOUNTER (OUTPATIENT)
Dept: URBAN - METROPOLITAN AREA CLINIC 86 | Facility: CLINIC | Age: 35
End: 2025-05-30

## 2025-05-30 NOTE — HPI-TODAY'S VISIT:
Beverley Combs,   May 29 ultrasound shows liver to have a mildly increased and coarsened echogenicity and they raised once again the question of underlying cirrhosis.  No focal lesions.   Common bile duct measured 3 mm.   Gallbladder was normal.   No significant ascites seen.   Visualized pancreas portions appeared unremarkable.   Liver and splenic vessels were patent with spleen 9.1 cm.   Right kidney 8 cm with no hydronephrosis.    Overall, they continue to feel the liver is mildly echogenic and coarsened which could reflect an underlying cirrhosis with no focal lesions and patent vessels.    As you recall on November ultrasound they mentioned that the liver was not as well-seen back 10 in the July exam as suggested the ultrasound been unremarkable.     Your prior [facility] MRI of 2023 had shown no significant fat or iron deposition in the liver back and had shown no definite changes advanced fibrosis.    There is a new MRI/MR elastography at [facility] does now that can actually give us a osiel look again at liver and also give us a specific fibrosis level estimate and I would suggest that we consider getting that done to address this further for you.    Not clear why the ultrasound continues to have these issues but that is something that we need to be very clear about.  I could not get you tonight so I am sending this to the portal but we will put in for the order for the MRI/MRe for you and try to get it set up for you to clarify this.  I will ask [facility] to order this Monday for you to be done asap.    Dr. Isbell

## 2025-06-19 ENCOUNTER — WEB ENCOUNTER (OUTPATIENT)
Dept: URBAN - METROPOLITAN AREA CLINIC 86 | Facility: CLINIC | Age: 35
End: 2025-06-19

## 2025-06-19 ENCOUNTER — TELEPHONE ENCOUNTER (OUTPATIENT)
Dept: URBAN - METROPOLITAN AREA CLINIC 86 | Facility: CLINIC | Age: 35
End: 2025-06-19

## 2025-06-19 LAB
ALBUMIN: 4.4
ALKALINE PHOSPHATASE: 148
ALT (SGPT): 14
AST (SGOT): 29
BASO (ABSOLUTE): 0
BASOS: 1
BILIRUBIN, TOTAL: 0.5
BUN/CREATININE RATIO: 16
BUN: 12
CALCIUM: 9.5
CARBON DIOXIDE, TOTAL: 22
CHLORIDE: 105
CREATININE: 0.75
EGFR: 106
EOS (ABSOLUTE): 0.1
EOS: 1
GLOBULIN, TOTAL: 3.8
GLUCOSE: 80
HEMATOCRIT: 40.4
HEMATOLOGY COMMENTS:: (no result)
HEMOGLOBIN: 13.2
IMMATURE CELLS: (no result)
IMMATURE GRANS (ABS): 0
IMMATURE GRANULOCYTES: 0
LIPASE: 25
LYMPHS (ABSOLUTE): 1.2
LYMPHS: 26
MCH: 30.9
MCHC: 32.7
MCV: 95
MONOCYTES(ABSOLUTE): 0.4
MONOCYTES: 10
NEUTROPHILS (ABSOLUTE): 2.8
NEUTROPHILS: 62
NRBC: (no result)
PLATELETS: 277
POTASSIUM: 4.4
PROTEIN, TOTAL: 8.2
RBC: 4.27
RDW: 11.9
SODIUM: 140
WBC: 4.4

## 2025-06-19 NOTE — HPI-TODAY'S VISIT:
Dear Paty Combs,    June 18 labs showed was lipase normal at 25, glucose normal at 80 and BUN of 12 creatinine 0.75 also normal.   Sodium 140 potassium 4.4 calcium 9.5 and albumin 4.4 with globulin 3.8.  Would point out that while the globulin is normal at 3.87 from 3.3 in October for comparison.   Bilirubin was normal at 0.5 and stable for you with the alk phos at 148 and AST 29 ALT 14 with previous AST 26 and ALT 16.   WBC 4.4 hemoglobin 13.2 MCV 95 and plate count 277 with neutrophils 2.8 and lymphocytes 1.6.   More recent main seventh labs showed his bilirubin 0.5 alk phos 118 which is up from 90 in March AST 29 ALT 14.  Clearly it appears that the alk phos is going up.   Unclear to me if this is from bone?  Or from the bile ducts being inflamed but curiously not seen other signs.   Have you been ill recently since you did mention that you were out of the country?  Are you off of your meds?    Called pt and on new vitamins: 1. B12, vit k, vit d3. 2. Says vit d was low that could cause bone alk phos to be. 3. Need to do alk phos fractionation. 4. We will send script to do labs. 5. Pt will redo labs in 2-3 weeks to follow this.  She has made the appt for the mri and it is pending.  Dr. Isbell

## 2025-06-27 ENCOUNTER — TELEPHONE ENCOUNTER (OUTPATIENT)
Dept: URBAN - METROPOLITAN AREA CLINIC 86 | Facility: CLINIC | Age: 35
End: 2025-06-27

## 2025-06-27 LAB
ALKALINE PHOSPHATASE: 138
BONE FRACTION:: 19
INTESTINAL FRAC.:: 1
LIVER FRACTION:: 80

## 2025-06-27 NOTE — HPI-TODAY'S VISIT:
Deaandre Combs,    June 23 labs show al 138 elevated and 80% liver and bone 19% and intestinal 1% so this is mainly from liver and not from bone.    Dr Isbell

## 2025-06-30 ENCOUNTER — LAB OUTSIDE AN ENCOUNTER (OUTPATIENT)
Dept: URBAN - METROPOLITAN AREA TELEHEALTH 2 | Facility: TELEHEALTH | Age: 35
End: 2025-06-30

## 2025-07-03 ENCOUNTER — LAB OUTSIDE AN ENCOUNTER (OUTPATIENT)
Dept: URBAN - METROPOLITAN AREA CLINIC 86 | Facility: CLINIC | Age: 35
End: 2025-07-03

## 2025-07-04 ENCOUNTER — TELEPHONE ENCOUNTER (OUTPATIENT)
Dept: URBAN - METROPOLITAN AREA CLINIC 86 | Facility: CLINIC | Age: 35
End: 2025-07-04

## 2025-07-04 LAB
ALBUMIN: 4.2
ALKALINE PHOSPHATASE: 157
ALT (SGPT): 14
AST (SGOT): 26
BILIRUBIN, DIRECT: 0.16
BILIRUBIN, TOTAL: 0.4
PROTEIN, TOTAL: 7.7

## 2025-07-04 NOTE — HPI-TODAY'S VISIT:
Dear Paty Combs,   July 3 labs shows total protein 7.7 albumin 4.2 bilirubin 0.4 indirect 0.16 but curiously alk phos higher at 157 from 118 back in May.  AST was 26 and ALT of 14 so those remain stable with albumin ALT less than 25.   Prior June 23 labs showed alk phos 138 and back then it was primarily for liver.    Need to see what is going on with the MRI/MR elastography appt status for you which we talked about getting done after your travel.  I do know that it does take sometimes 6 to 8 weeks to get it done.    Please let us know if it has not been set up.    Also please let us know if there has been any medicine changes or other issues to explain why the alkaline phosphatase has gone up higher in an isolated fashion.  Good to see that the other labs have not but unclear why it has.    Dr. Isbell

## 2025-07-07 ENCOUNTER — WEB ENCOUNTER (OUTPATIENT)
Dept: URBAN - METROPOLITAN AREA CLINIC 86 | Facility: CLINIC | Age: 35
End: 2025-07-07

## 2025-07-08 ENCOUNTER — WEB ENCOUNTER (OUTPATIENT)
Dept: URBAN - METROPOLITAN AREA CLINIC 86 | Facility: CLINIC | Age: 35
End: 2025-07-08

## 2025-07-08 ENCOUNTER — LAB OUTSIDE AN ENCOUNTER (OUTPATIENT)
Dept: URBAN - METROPOLITAN AREA CLINIC 86 | Facility: CLINIC | Age: 35
End: 2025-07-08

## 2025-07-09 ENCOUNTER — TELEPHONE ENCOUNTER (OUTPATIENT)
Dept: URBAN - METROPOLITAN AREA CLINIC 86 | Facility: CLINIC | Age: 35
End: 2025-07-09

## 2025-07-09 LAB
ALBUMIN: 4.1
ALKALINE PHOSPHATASE: 136
ALT (SGPT): 16
AST (SGOT): 29
BASO (ABSOLUTE): 0
BASOS: 0
BILIRUBIN, TOTAL: 0.4
BUN/CREATININE RATIO: 13
BUN: 9
CALCIUM: 9.3
CARBON DIOXIDE, TOTAL: 20
CHLORIDE: 103
CREATININE: 0.67
EGFR: 116
EOS (ABSOLUTE): 0.1
EOS: 1
GLOBULIN, TOTAL: 3.7
GLUCOSE: 76
HEMATOCRIT: 39
HEMATOLOGY COMMENTS:: (no result)
HEMOGLOBIN: 12.5
IMMATURE CELLS: (no result)
IMMATURE GRANS (ABS): 0
IMMATURE GRANULOCYTES: 0
LIPASE: 33
LYMPHS (ABSOLUTE): 1.2
LYMPHS: 21
MCH: 30.5
MCHC: 32.1
MCV: 95
MONOCYTES(ABSOLUTE): 0.4
MONOCYTES: 7
NEUTROPHILS (ABSOLUTE): 4
NEUTROPHILS: 71
NRBC: (no result)
PLATELETS: 231
POTASSIUM: 4
PROTEIN, TOTAL: 7.8
RBC: 4.1
RDW: 12.3
SODIUM: 137
WBC: 5.7

## 2025-07-09 NOTE — HPI-TODAY'S VISIT:
Dear Paty Combs,   July 8 labs showed was lipase normal at 33 and previously was 25 back in June.   Glucose 76 BUN of 9 creatinine 0.67 sodium 137 potassium 4.0 calcium 9.3 albumin 4.1 bilirubin 0.4 alk phos was 136 and down from 148 so clearly vitamin D supplementation may be helping.   AST was 29 and ALT of 16 with ideal ALT less than 25.  Previously AST 29 and ALT 14 so those are about the same.   WBC 5.7 hemoglobin 12.5 platelet count 231 MCV 95 with neutrophils 4.0 and lymphocytes 1.2.   I would redo labs in 3-4 weeks to see if that gets better with tx as doing. Headed right way.   Dr. Isbell

## 2025-07-12 ENCOUNTER — TELEPHONE ENCOUNTER (OUTPATIENT)
Dept: URBAN - METROPOLITAN AREA CLINIC 86 | Facility: CLINIC | Age: 35
End: 2025-07-12

## 2025-07-12 LAB
ALKALINE PHOSPHATASE: 135
BONE FRACTION:: 18
INTESTINAL FRAC.:: 2
LIVER FRACTION:: 80

## 2025-07-12 NOTE — HPI-TODAY'S VISIT:
Dear Paty Combs,   Confirmed again to be certain via July 8 labs that even though you have this isolated alk phos of 135 that it is primarily from liver 80% and bone only 18% and intestinal 2%.   Glad to see that it is dropping a little and we need to follow it but it is clearly from a liver source and not a bone source.   Dr. Isbell

## 2025-08-08 ENCOUNTER — WEB ENCOUNTER (OUTPATIENT)
Dept: URBAN - METROPOLITAN AREA CLINIC 86 | Facility: CLINIC | Age: 35
End: 2025-08-08

## 2025-08-08 ENCOUNTER — TELEPHONE ENCOUNTER (OUTPATIENT)
Dept: URBAN - METROPOLITAN AREA CLINIC 86 | Facility: CLINIC | Age: 35
End: 2025-08-08

## 2025-08-08 ENCOUNTER — OFFICE VISIT (OUTPATIENT)
Dept: URBAN - METROPOLITAN AREA TELEHEALTH 2 | Facility: TELEHEALTH | Age: 35
End: 2025-08-08

## 2025-08-08 RX ORDER — PREDNISONE 1 MG/1
1 TABLET WITH FOOD ONCE A WEEK (NEW DOSE) TABLET ORAL
Qty: 12 | Refills: 1
Start: 2025-08-03

## 2025-08-08 RX ORDER — AZATHIOPRINE 50 MG/1
TAKE 1 TABLET BY MOUTH DAILY TABLET ORAL
Qty: 90 TABLET | Refills: 1 | Status: ACTIVE | COMMUNITY
Start: 2025-05-04 | End: 2025-10-31

## 2025-08-08 RX ORDER — CHOLECALCIFEROL (VITAMIN D3) 50 MCG
1 TABLET CAPSULE ORAL ONCE A DAY
Status: ACTIVE | COMMUNITY

## 2025-08-08 RX ORDER — VALACYCLOVIR 500 MG/1
1 TABLET TABLET, FILM COATED ORAL BID PRN
Status: ACTIVE | COMMUNITY

## 2025-08-08 RX ORDER — URSODIOL 250 MG/1
1 TABLET WITH FOOD TABLET ORAL
Qty: 270 | Refills: 1 | Status: ACTIVE | COMMUNITY
Start: 2025-05-04

## 2025-08-08 RX ORDER — URSODIOL 250 MG/1
1 TABLET WITH FOOD TABLET ORAL
Qty: 270 | Refills: 1 | OUTPATIENT
Start: 2025-08-03

## 2025-08-08 RX ORDER — CALCIUM CARBONATE/VITAMIN D3 500MG-5MCG
1 TABLET WITH MEALS TABLET ORAL
Status: ACTIVE | COMMUNITY

## 2025-08-08 RX ORDER — PREDNISONE 1 MG/1
1 TABLET WITH FOOD ONCE A WEEK (NEW DOSE) TABLET ORAL
Qty: 12 | Refills: 1 | Status: ACTIVE | COMMUNITY
Start: 2025-05-04

## 2025-08-08 RX ORDER — AZATHIOPRINE 50 MG/1
TAKE 1 TABLET BY MOUTH DAILY TABLET ORAL
Qty: 90 TABLET | Refills: 1
Start: 2025-08-03 | End: 2026-01-30

## 2025-08-08 RX ORDER — FERROUS SULFATE 325(65) MG
1 TABLET TABLET ORAL TIW
Status: ACTIVE | COMMUNITY

## 2025-08-11 ENCOUNTER — LAB OUTSIDE AN ENCOUNTER (OUTPATIENT)
Dept: URBAN - METROPOLITAN AREA TELEHEALTH 2 | Facility: TELEHEALTH | Age: 35
End: 2025-08-11

## 2025-08-18 ENCOUNTER — TELEPHONE ENCOUNTER (OUTPATIENT)
Dept: URBAN - METROPOLITAN AREA CLINIC 86 | Facility: CLINIC | Age: 35
End: 2025-08-18

## 2025-08-19 ENCOUNTER — WEB ENCOUNTER (OUTPATIENT)
Dept: URBAN - METROPOLITAN AREA CLINIC 86 | Facility: CLINIC | Age: 35
End: 2025-08-19